# Patient Record
Sex: FEMALE | Race: WHITE | NOT HISPANIC OR LATINO | Employment: OTHER | URBAN - METROPOLITAN AREA
[De-identification: names, ages, dates, MRNs, and addresses within clinical notes are randomized per-mention and may not be internally consistent; named-entity substitution may affect disease eponyms.]

---

## 2017-01-10 RX ORDER — AMLODIPINE BESYLATE AND BENAZEPRIL HYDROCHLORIDE 5; 40 MG/1; MG/1
CAPSULE ORAL
Qty: 90 CAPSULE | Refills: 0 | OUTPATIENT
Start: 2017-01-10

## 2017-03-29 RX ORDER — HYDROCHLOROTHIAZIDE 25 MG/1
TABLET ORAL
Qty: 90 TABLET | Refills: 0 | Status: SHIPPED | OUTPATIENT
Start: 2017-03-29 | End: 2017-07-24 | Stop reason: SDUPTHER

## 2017-05-09 DIAGNOSIS — Z51.81 THERAPEUTIC DRUG MONITORING: ICD-10-CM

## 2017-05-09 DIAGNOSIS — I10 BENIGN ESSENTIAL HYPERTENSION: Primary | ICD-10-CM

## 2017-05-09 DIAGNOSIS — E78.5 HYPERLIPIDEMIA, UNSPECIFIED HYPERLIPIDEMIA TYPE: ICD-10-CM

## 2017-05-09 DIAGNOSIS — E55.9 VITAMIN D DEFICIENCY: ICD-10-CM

## 2017-05-10 ENCOUNTER — RESULTS ENCOUNTER (OUTPATIENT)
Dept: INTERNAL MEDICINE | Facility: CLINIC | Age: 82
End: 2017-05-10

## 2017-05-10 DIAGNOSIS — E78.5 HYPERLIPIDEMIA, UNSPECIFIED HYPERLIPIDEMIA TYPE: ICD-10-CM

## 2017-05-10 DIAGNOSIS — Z51.81 THERAPEUTIC DRUG MONITORING: ICD-10-CM

## 2017-05-10 DIAGNOSIS — E55.9 VITAMIN D DEFICIENCY: ICD-10-CM

## 2017-05-10 DIAGNOSIS — I10 BENIGN ESSENTIAL HYPERTENSION: ICD-10-CM

## 2017-05-11 LAB
25(OH)D3+25(OH)D2 SERPL-MCNC: 27 NG/ML (ref 30–100)
ALBUMIN SERPL-MCNC: 4.1 G/DL (ref 3.5–5.2)
ALBUMIN/GLOB SERPL: 1.4 G/DL
ALP SERPL-CCNC: 103 U/L (ref 39–117)
ALT SERPL-CCNC: 19 U/L (ref 1–33)
APPEARANCE UR: CLEAR
AST SERPL-CCNC: 23 U/L (ref 1–32)
BACTERIA #/AREA URNS HPF: ABNORMAL /HPF
BILIRUB SERPL-MCNC: 0.4 MG/DL (ref 0.1–1.2)
BILIRUB UR QL STRIP: NEGATIVE
BUN SERPL-MCNC: 17 MG/DL (ref 8–23)
BUN/CREAT SERPL: 19.3 (ref 7–25)
CALCIUM SERPL-MCNC: 9.6 MG/DL (ref 8.6–10.5)
CASTS URNS MICRO: ABNORMAL
CHLORIDE SERPL-SCNC: 96 MMOL/L (ref 98–107)
CHOLEST SERPL-MCNC: 155 MG/DL (ref 100–199)
CO2 SERPL-SCNC: 27.9 MMOL/L (ref 22–29)
COLOR UR: YELLOW
CREAT SERPL-MCNC: 0.88 MG/DL (ref 0.57–1)
EPI CELLS #/AREA URNS HPF: ABNORMAL /HPF
ERYTHROCYTE [DISTWIDTH] IN BLOOD BY AUTOMATED COUNT: 13.9 % (ref 11.7–13)
GLOBULIN SER CALC-MCNC: 2.9 GM/DL
GLUCOSE SERPL-MCNC: 105 MG/DL (ref 65–99)
GLUCOSE UR QL: NEGATIVE
HCT VFR BLD AUTO: 41.4 % (ref 35.6–45.5)
HDL SERPL-SCNC: 41.1 UMOL/L
HDLC SERPL-MCNC: 63 MG/DL
HGB BLD-MCNC: 13.2 G/DL (ref 11.9–15.5)
HGB UR QL STRIP: NEGATIVE
KETONES UR QL STRIP: NEGATIVE
LDL SERPL QN: 21.2 NM
LDL SERPL-SCNC: 973 NMOL/L
LDL SMALL SERPL-SCNC: 532 NMOL/L
LDLC SERPL CALC-MCNC: 73 MG/DL (ref 0–99)
LEUKOCYTE ESTERASE UR QL STRIP: ABNORMAL
MCH RBC QN AUTO: 31.4 PG (ref 26.9–32)
MCHC RBC AUTO-ENTMCNC: 31.9 G/DL (ref 32.4–36.3)
MCV RBC AUTO: 98.6 FL (ref 80.5–98.2)
NITRITE UR QL STRIP: NEGATIVE
PH UR STRIP: 7 [PH] (ref 5–8)
PLATELET # BLD AUTO: 373 10*3/MM3 (ref 140–500)
POTASSIUM SERPL-SCNC: 4.4 MMOL/L (ref 3.5–5.2)
PROT SERPL-MCNC: 7 G/DL (ref 6–8.5)
PROT UR QL STRIP: NEGATIVE
RBC # BLD AUTO: 4.2 10*6/MM3 (ref 3.9–5.2)
RBC #/AREA URNS HPF: ABNORMAL /HPF
SODIUM SERPL-SCNC: 138 MMOL/L (ref 136–145)
SP GR UR: 1.02 (ref 1–1.03)
TRIGL SERPL-MCNC: 94 MG/DL (ref 0–149)
TSH SERPL DL<=0.005 MIU/L-ACNC: 3.54 UIU/ML (ref 0.45–4.5)
UROBILINOGEN UR STRIP-MCNC: ABNORMAL MG/DL
WBC # BLD AUTO: 4.34 10*3/MM3 (ref 4.5–10.7)
WBC #/AREA URNS HPF: ABNORMAL /HPF

## 2017-05-17 ENCOUNTER — OFFICE VISIT (OUTPATIENT)
Dept: INTERNAL MEDICINE | Facility: CLINIC | Age: 82
End: 2017-05-17

## 2017-05-17 VITALS
SYSTOLIC BLOOD PRESSURE: 130 MMHG | HEIGHT: 62 IN | HEART RATE: 78 BPM | BODY MASS INDEX: 26.87 KG/M2 | OXYGEN SATURATION: 98 % | WEIGHT: 146 LBS | DIASTOLIC BLOOD PRESSURE: 58 MMHG

## 2017-05-17 DIAGNOSIS — N95.1 MENOPAUSAL STATE: ICD-10-CM

## 2017-05-17 DIAGNOSIS — E78.5 HYPERLIPIDEMIA, UNSPECIFIED HYPERLIPIDEMIA TYPE: Chronic | ICD-10-CM

## 2017-05-17 DIAGNOSIS — I10 BENIGN ESSENTIAL HYPERTENSION: Chronic | ICD-10-CM

## 2017-05-17 DIAGNOSIS — I65.23 ATHEROSCLEROSIS OF BOTH CAROTID ARTERIES: Chronic | ICD-10-CM

## 2017-05-17 DIAGNOSIS — K58.9 IRRITABLE BOWEL SYNDROME WITHOUT DIARRHEA: Chronic | ICD-10-CM

## 2017-05-17 DIAGNOSIS — R00.2 HEART PALPITATIONS: Chronic | ICD-10-CM

## 2017-05-17 DIAGNOSIS — Z51.81 THERAPEUTIC DRUG MONITORING: ICD-10-CM

## 2017-05-17 DIAGNOSIS — E55.9 VITAMIN D DEFICIENCY: Chronic | ICD-10-CM

## 2017-05-17 DIAGNOSIS — Z00.00 INITIAL MEDICARE ANNUAL WELLNESS VISIT: Primary | ICD-10-CM

## 2017-05-17 DIAGNOSIS — M85.80 OSTEOPENIA: Chronic | ICD-10-CM

## 2017-05-17 PROCEDURE — G0438 PPPS, INITIAL VISIT: HCPCS | Performed by: INTERNAL MEDICINE

## 2017-05-17 PROCEDURE — 99214 OFFICE O/P EST MOD 30 MIN: CPT | Performed by: INTERNAL MEDICINE

## 2017-05-23 ENCOUNTER — TELEPHONE (OUTPATIENT)
Dept: INTERNAL MEDICINE | Facility: CLINIC | Age: 82
End: 2017-05-23

## 2017-05-26 RX ORDER — ATORVASTATIN CALCIUM 80 MG/1
TABLET, FILM COATED ORAL
Qty: 30 TABLET | Refills: 3 | Status: SHIPPED | OUTPATIENT
Start: 2017-05-26 | End: 2017-12-05 | Stop reason: SDUPTHER

## 2017-05-30 ENCOUNTER — HOSPITAL ENCOUNTER (OUTPATIENT)
Dept: CARDIOLOGY | Facility: HOSPITAL | Age: 82
Discharge: HOME | End: 2017-05-30
Admitting: INTERNAL MEDICINE

## 2017-05-30 LAB
BH CV XLRA MEAS LEFT DIST CCA EDV: -19.9 CM/SEC
BH CV XLRA MEAS LEFT DIST CCA PSV: -83.8 CM/SEC
BH CV XLRA MEAS LEFT DIST ICA EDV: -25.2 CM/SEC
BH CV XLRA MEAS LEFT DIST ICA PSV: -93.8 CM/SEC
BH CV XLRA MEAS LEFT MID ICA EDV: -29.3 CM/SEC
BH CV XLRA MEAS LEFT MID ICA PSV: -103 CM/SEC
BH CV XLRA MEAS LEFT PROX CCA EDV: 13.5 CM/SEC
BH CV XLRA MEAS LEFT PROX CCA PSV: 76.2 CM/SEC
BH CV XLRA MEAS LEFT PROX ECA EDV: -8.2 CM/SEC
BH CV XLRA MEAS LEFT PROX ECA PSV: -85 CM/SEC
BH CV XLRA MEAS LEFT PROX ICA EDV: -21.1 CM/SEC
BH CV XLRA MEAS LEFT PROX ICA PSV: -79.7 CM/SEC
BH CV XLRA MEAS LEFT PROX SCLA PSV: 106 CM/SEC
BH CV XLRA MEAS LEFT VERTEBRAL A EDV: -14.7 CM/SEC
BH CV XLRA MEAS LEFT VERTEBRAL A PSV: -57.5 CM/SEC
BH CV XLRA MEAS RIGHT DIST CCA EDV: 16.4 CM/SEC
BH CV XLRA MEAS RIGHT DIST CCA PSV: 78 CM/SEC
BH CV XLRA MEAS RIGHT DIST ICA EDV: -19.9 CM/SEC
BH CV XLRA MEAS RIGHT DIST ICA PSV: -87.4 CM/SEC
BH CV XLRA MEAS RIGHT MID ICA EDV: -25.2 CM/SEC
BH CV XLRA MEAS RIGHT MID ICA PSV: -97.3 CM/SEC
BH CV XLRA MEAS RIGHT PROX CCA EDV: 15.8 CM/SEC
BH CV XLRA MEAS RIGHT PROX CCA PSV: 76.8 CM/SEC
BH CV XLRA MEAS RIGHT PROX ECA EDV: -8.2 CM/SEC
BH CV XLRA MEAS RIGHT PROX ECA PSV: -85 CM/SEC
BH CV XLRA MEAS RIGHT PROX ICA EDV: -15.8 CM/SEC
BH CV XLRA MEAS RIGHT PROX ICA PSV: -82.7 CM/SEC
BH CV XLRA MEAS RIGHT PROX SCLA PSV: 106 CM/SEC
BH CV XLRA MEAS RIGHT VERTEBRAL A EDV: -5.2 CM/SEC
BH CV XLRA MEAS RIGHT VERTEBRAL A PSV: -30.5 CM/SEC
LEFT ARM BP: NORMAL MMHG
RIGHT ARM BP: NORMAL MMHG

## 2017-05-30 PROCEDURE — 93880 EXTRACRANIAL BILAT STUDY: CPT

## 2017-07-06 RX ORDER — AMLODIPINE BESYLATE AND BENAZEPRIL HYDROCHLORIDE 5; 40 MG/1; MG/1
1 CAPSULE ORAL DAILY
Qty: 90 CAPSULE | Refills: 1 | Status: SHIPPED | OUTPATIENT
Start: 2017-07-06 | End: 2017-07-13 | Stop reason: SDUPTHER

## 2017-07-13 RX ORDER — AMLODIPINE BESYLATE AND BENAZEPRIL HYDROCHLORIDE 5; 40 MG/1; MG/1
1 CAPSULE ORAL DAILY
Qty: 7 CAPSULE | Refills: 0 | Status: SHIPPED | OUTPATIENT
Start: 2017-07-13 | End: 2018-01-09 | Stop reason: SDUPTHER

## 2017-07-24 RX ORDER — HYDROCHLOROTHIAZIDE 25 MG/1
TABLET ORAL
Qty: 90 TABLET | Refills: 0 | Status: SHIPPED | OUTPATIENT
Start: 2017-07-24 | End: 2017-08-29 | Stop reason: SDUPTHER

## 2017-08-29 ENCOUNTER — OFFICE VISIT (OUTPATIENT)
Dept: INTERNAL MEDICINE | Facility: CLINIC | Age: 82
End: 2017-08-29

## 2017-08-29 VITALS
HEIGHT: 62 IN | SYSTOLIC BLOOD PRESSURE: 120 MMHG | HEART RATE: 75 BPM | OXYGEN SATURATION: 94 % | WEIGHT: 147 LBS | DIASTOLIC BLOOD PRESSURE: 64 MMHG | BODY MASS INDEX: 27.05 KG/M2

## 2017-08-29 DIAGNOSIS — N30.00 ACUTE CYSTITIS WITHOUT HEMATURIA: Primary | ICD-10-CM

## 2017-08-29 DIAGNOSIS — I10 BENIGN ESSENTIAL HYPERTENSION: Chronic | ICD-10-CM

## 2017-08-29 DIAGNOSIS — N32.89 IRRITABLE BLADDER: ICD-10-CM

## 2017-08-29 LAB
BILIRUB BLD-MCNC: NEGATIVE MG/DL
GLUCOSE UR STRIP-MCNC: NEGATIVE MG/DL
KETONES UR QL: NEGATIVE
LEUKOCYTE EST, POC: NEGATIVE
NITRITE UR-MCNC: NEGATIVE MG/ML
PH UR: 7 [PH] (ref 5–8)
PROT UR STRIP-MCNC: NEGATIVE MG/DL
RBC # UR STRIP: NEGATIVE /UL
SP GR UR: 1.01 (ref 1–1.03)
UROBILINOGEN UR QL: NORMAL

## 2017-08-29 PROCEDURE — 99213 OFFICE O/P EST LOW 20 MIN: CPT | Performed by: INTERNAL MEDICINE

## 2017-08-29 PROCEDURE — 81003 URINALYSIS AUTO W/O SCOPE: CPT | Performed by: INTERNAL MEDICINE

## 2017-08-29 RX ORDER — HYDROCHLOROTHIAZIDE 25 MG/1
TABLET ORAL
Qty: 90 TABLET | Refills: 3
Start: 2017-08-29 | End: 2017-10-22 | Stop reason: SDUPTHER

## 2017-08-29 NOTE — PROGRESS NOTES
08/29/2017    Patient Information  Stephanie Hawkins                                                                                          [address] [facility] [address]      12/16/1932  [phone]      Chief Complaint:     Follow-up urinary tract infection.    History of Present Illness:    Patient with a history of hypertension, carotid artery plaque, hyperlipidemia, osteopenia.  She presents today for follow-up of recent urinary tract infection that was treated at the urgent care.  This will be described in detail below.  Past medical history reviewed and updated where necessary including health maintenance parameters.  This reveals she is up-to-date.    Review of Systems   Constitution: Negative.   HENT: Negative.    Eyes: Negative.    Cardiovascular: Negative.    Respiratory: Negative.    Endocrine: Negative.    Hematologic/Lymphatic: Negative.    Skin: Negative.    Musculoskeletal: Negative.    Gastrointestinal: Negative.    Genitourinary: Positive for frequency and nocturia.   Neurological: Negative.    Psychiatric/Behavioral: Negative.    Allergic/Immunologic: Negative.        Active Problems:    Patient Active Problem List   Diagnosis   • Benign essential hypertension   • Carotid artery plaque, 05/30/2014--mild bilateral carotid plaque.   • Diverticulosis of colon   • Heart palpitations   • Hyperlipidemia   • Irritable bowel syndrome   • Osteoarthritis of left knee   • Osteopenia, 05/30/2014--lumbar spine 0.6.  Right femoral neck -1.2.  Left femoral neck -1.7.   • Vitamin D deficiency   • Therapeutic drug monitoring   • Glaucoma, bilateral   • Menopausal state   • Urinary tract infection         Past Medical History:   Diagnosis Date   • History of bone density study 05/30/2014 05/30/2014--DEXA scan reveals average lumbar spine T score of 0.6. Right femoral neck T score -1.2. Left femoral neck T score -1.7. Osteopenia. Compared to the previous examination of 11/09/2010, there is a 1%  decrease in the left hip bone mineral density, 2.1% decrease in the right hip bone density, and a 1.6% improvement in the lumbar spine bone density.   11/22/2010--treatment for osteopenia be   • History of carotid Doppler/vascular screen 05/30/2014 05/30/2014--vascular screen reveals mild bilateral carotid plaque, negative for AAA, negative for PAD.   06/19/2012--vascular screen revealed bilateral, less than 50% internal carotid artery stenosis, negative for AAA, negative for PAD.   05/10/2010--carotid Doppler revealed 16-49% stenosis in the right and left internal carotid arteries.   01/08/2002--vascular screen revealed mild bilateral carot   • History of chest x-ray 03/04/2009 03/04/2009--chest x-ray reveals small calcified granuloma bilaterally. No active disease.   • History of depression 05/18/2009 05/18/2009--treatment for depression initiated with Lexapro. This was related to patient's son developing cancer. She subsequently discontinued the medication and has had no further problems.   • History of dislocation of shoulder 01/16/2006 01/16/2006--patient fell and dislocated her shoulder and subsequently underwent reduction.   • History of herpes zoster virus vaccination 01/11/2016 1/11/2016--zoster vaccination given.   • History of Holter monitoring 10/13/2008    10/13/2008--24-hour Holter monitor reveals an average heart rate of 69, a minimum heart rate was 55. Maximum heart rate was 98. There were no pauses. Ventricular ectopy was zero. Supraventricular ectopy was 5585, with 15 supraventricular runs. Supraventricular bigeminy events were 6 and supraventricular trigeminy events were one. There was no atrial fibrillation/flutter. No ST segment changes. Pat   • History of mammogram 10/07/2015    10/07/2015--negative mammogram.   09/26/2014--normal mammogram.   9/10/2013--negative mammogram.   09/07/2012--negative mammogram.   • History of pneumococcal vaccination 05/01/2015 05/01/2015--Prevnar  13 given. Patient received her initial pneumococcal vaccination after the age of 65 and therefore no further pneumococcal vaccinations required after today's Prevnar.   [facility]   • History of Right rotator cuff tendinitis 02/07/20112    02/07/2012--patient evaluated by the orthopedist and given a Depo-Medrol injection in the right shoulder for rotator cuff tendinitis.   • History of Temporomandibular joint disease 11/30/2010 11/30/2010--patient was evaluated by ENT with complaints consistent with TMJ. Patient reports that her symptoms have resolved.         Past Surgical History:   Procedure Laterality Date   • CATARACT EXTRACTION Right 01/2014 January 2014--cataract extirpation and intraocular lens implantation right eye.    • CATARACT EXTRACTION Left 09/2010 September 2010--cataract extirpation and intraocular lens implantation in the left eye.   • COLONOSCOPY  11/03/2006 11/03/2006--the entire examined colon was normal except for scattered diverticuli.    • COLONOSCOPY  10/16/2001    10/16/2001--colonoscopy revealed a few left-sided scattered diverticuli.         Allergies   Allergen Reactions   • Neomycin-Bacitracin Zn-Polymyx    • Sulfa Antibiotics            Current Outpatient Prescriptions:   •  amLODIPine-benazepril (LOTREL) 5-40 MG per capsule, Take 1 capsule by mouth Daily., Disp: 7 capsule, Rfl: 0  •  aspirin 81 MG tablet, Take  by mouth., Disp: , Rfl:   •  atorvastatin (LIPITOR) 80 MG tablet, TAKE 1 TABLET BY MOUTH AT BEDTIME FOR CHOLESTEROL, Disp: 30 tablet, Rfl: 3  •  Cholecalciferol (VITAMIN D3) 5000 UNITS capsule capsule, 1 by mouth daily as directed, Disp: 30 capsule, Rfl:   •  Garlic 1000 MG capsule, Take  by mouth., Disp: , Rfl:   •  hydrochlorothiazide (HYDRODIURIL) 25 MG tablet, TAKE 1 TABLET DAILY (NEED LABS AND FOLLOW UP AS SOON AS POSSIBLE), Disp: 90 tablet, Rfl: 0  •  metoprolol succinate XL (TOPROL-XL) 25 MG  "24 hr tablet, Take  by mouth., Disp: , Rfl:   •  Multiple Vitamin (MULTI VITAMIN DAILY PO), Take  by mouth daily., Disp: , Rfl:   •  TRAVATAN Z 0.004 % solution ophthalmic solution, , Disp: , Rfl:       Family History   Problem Relation Age of Onset   • Cirrhosis Mother      Of Liver. Mother  at age 62 from alcoholic cirrhosis.   • Heart attack Father      Acute Myocardial Infarction.  Father  of a myocardial infarction at age 68.         Social History     Social History   • Marital status:      Spouse name: N/A   • Number of children: N/A   • Years of education: N/A     Occupational History   • Retired () [employer]      Social History Main Topics   • Smoking status: Never Smoker   • Smokeless tobacco: Never Used   • Alcohol use No   • Drug use: No   • Sexual activity: Not Currently     Partners: Male     Other Topics Concern   • Not on file     Social History Narrative         Vitals:    17 1620   BP: 120/64   Pulse: 75   SpO2: 94%   Weight: 147 lb (66.7 kg)   Height: 62\" (157.5 cm)          Physical Exam:    General: Alert and oriented x 3.  No acute distress.  Normal affect.  HEENT: Pupils equal, round, reactive to light; extraocular movements intact; sclerae nonicteric; pharynx, ear canals and TMs normal.  Neck: Without JVD, thyromegaly, bruit, or adenopathy.  Lungs: Clear to auscultation in all fields.  Heart: Regular rate and rhythm without murmur, rub, gallop, or click.  Abdomen: Soft, nontender, without hepatosplenomegaly or hernia.  Bowel sounds normal.  : Deferred.  Rectal: Deferred.  Extremities: Without clubbing, cyanosis, edema, or pulse deficit.  Neurologic: Intact without focal deficit.  Normal station and gait observed during ingress and egress from the examination room.  Skin: Without significant lesion.  Musculoskeletal: Unremarkable.      Lab/other results:    Automated urinalysis is negative for blood, leukocytes, " nitrites.    Assessment/Plan:     Diagnosis Plan   1. Urinary tract infection       Patient with a documented recent urinary tract infection presents with continued urinary frequency but has no dysuria.  Automated urinalysis appears negative but I think we should be assured that the urinary tract infection has been cured with a culture.  It may be that patient is experiencing irritable bladder symptoms.  If the symptoms persist then she may want to see me and consider medication for irritable bladder.    Plan is as follows: Check urine culture.  Patient will follow-up on the phone for the results.  She should follow-up with me if she has any progression of her symptoms.    Addendum: Upon further consideration, particularly after patient reports nocturia 4-5 times per night as well as the associated frequency and urgency, we will give an empiric trial of Myrbetriq 25 mg by mouth daily.  Patient may increase to 50 mg per day if necessary.        Procedures

## 2017-09-01 LAB
BACTERIA UR CULT: NORMAL
BACTERIA UR CULT: NORMAL

## 2017-09-26 ENCOUNTER — OFFICE VISIT (OUTPATIENT)
Dept: INTERNAL MEDICINE | Facility: CLINIC | Age: 82
End: 2017-09-26

## 2017-09-26 VITALS
WEIGHT: 148 LBS | BODY MASS INDEX: 27.23 KG/M2 | SYSTOLIC BLOOD PRESSURE: 112 MMHG | DIASTOLIC BLOOD PRESSURE: 62 MMHG | OXYGEN SATURATION: 99 % | HEART RATE: 66 BPM | HEIGHT: 62 IN

## 2017-09-26 DIAGNOSIS — E78.5 HYPERLIPIDEMIA, UNSPECIFIED HYPERLIPIDEMIA TYPE: Chronic | ICD-10-CM

## 2017-09-26 DIAGNOSIS — Z51.81 THERAPEUTIC DRUG MONITORING: ICD-10-CM

## 2017-09-26 DIAGNOSIS — E55.9 VITAMIN D DEFICIENCY: Chronic | ICD-10-CM

## 2017-09-26 DIAGNOSIS — Z23 NEED FOR INFLUENZA VACCINATION: ICD-10-CM

## 2017-09-26 DIAGNOSIS — I10 BENIGN ESSENTIAL HYPERTENSION: Chronic | ICD-10-CM

## 2017-09-26 DIAGNOSIS — N30.00 ACUTE CYSTITIS WITHOUT HEMATURIA: Primary | ICD-10-CM

## 2017-09-26 DIAGNOSIS — I65.23 ATHEROSCLEROSIS OF BOTH CAROTID ARTERIES: Chronic | ICD-10-CM

## 2017-09-26 PROCEDURE — G0008 ADMIN INFLUENZA VIRUS VAC: HCPCS | Performed by: INTERNAL MEDICINE

## 2017-09-26 PROCEDURE — 99213 OFFICE O/P EST LOW 20 MIN: CPT | Performed by: INTERNAL MEDICINE

## 2017-09-26 NOTE — PROGRESS NOTES
09/26/2017    Patient Information  Stephanie Hawkins                                                                                          [address] [id number]      12/16/1932  [phone]      Chief Complaint:     Follow-up recent urinary tract infection.  Requests flu shot.  Follow-up carotid artery stenosis and recent carotid Doppler study.  No new acute complaints.    History of Present Illness:    Patient with a history of hypertension, carotid artery plaque, heart palpitations, hyperlipidemia, IBS, primary osteoarthritis of left knee, osteopenia, vitamin D deficiency, glaucoma, irritable bladder, recent urinary tract infection.  She presents today primarily to follow-up on a urinary tract infection that was somewhat slow to resolve.  She currently feels well and has no new acute complaints.  Patient has carotid artery plaque and had a carotid Doppler study in May of this year that we need to review.  Past medical history reviewed and updated where necessary including health maintenance parameters.  This reveals she needs an influenza vaccination which we will give today.     The history regarding recent urinary tract infection:    09/26/2017--patient seen in follow-up and reports she feels better and she is back to her old self.  Results of the urine culture discussed.  It appears her urinary tract infection is cured.    09/01/2017--urine culture reveals less than 10,000 colony-forming units that is considered insignificant.    08/29/2017--patient seen in follow-up by Dr. Dietrich and she reports that she really does not feel well.  She has no dysuria but has urinary frequency.  Automated urinalysis is negative for leukocytes and also negative for blood and nitrites.    08/07/2017--patient presented to the urgent care with complaints of dysuria described as burning as well as urinary frequency and urgency.  Urinalysis revealed 3+ leukocytes and trace blood.  Nitrite negative.  She was  discharged home on nitrofurantoin.  However, urine cultures returned greater than 100,000 colony-forming units of Proteus which was resistant to nitrofurantoin.  She was subsequently switched to Omnicef.    Review of Systems   Constitution: Negative.   HENT: Negative.    Eyes: Negative.    Cardiovascular: Negative.    Respiratory: Negative.    Endocrine: Negative.    Hematologic/Lymphatic: Negative.    Skin: Negative.    Musculoskeletal: Negative.    Gastrointestinal: Negative.    Genitourinary: Negative.    Neurological: Negative.    Psychiatric/Behavioral: Negative.    Allergic/Immunologic: Negative.        Active Problems:    Patient Active Problem List   Diagnosis   • Benign essential hypertension   • Carotid artery plaque, 05/30/2017--16-49% left.  Mild plaque right.  05/30/2014--mild bilateral carotid plaque.   • Diverticulosis of colon   • Heart palpitations   • Hyperlipidemia   • Irritable bowel syndrome   • Primary osteoarthritis of left knee   • Osteopenia, 05/30/2014--lumbar spine 0.6.  Right femoral neck -1.2.  Left femoral neck -1.7.   • Vitamin D deficiency   • Therapeutic drug monitoring   • Glaucoma, bilateral   • Menopausal state   • History of Urinary tract infection   • Irritable bladder         Past Medical History:   Diagnosis Date   • Benign essential hypertension 2/26/2001 02/26/2001--patient was seen as a new patient. She was ordered being treated for hypertension at that time.   • Carotid artery plaque, 05/30/2014--mild bilateral carotid plaque. 1/8/2002 05/30/2014--vascular screen reveals mild bilateral carotid plaque, negative for AAA, negative for PAD.   06/19/2012--vascular screen revealed bilateral, less than 50% internal carotid artery stenosis, negative for AAA, negative for PAD.   05/10/2010--carotid Doppler revealed 16-49% stenosis in the right and left internal carotid arteries.   01/08/2002--vascular screen revealed mild bilateral carotid artery plaque, negative for AAA,  negative for PAD.   • Diverticulosis of colon 10/16/2001    11/03/2006--the entire examined colon was normal except for scattered diverticuli.   10/16/2001--colonoscopy revealed a few left-sided scattered diverticuli.   • Glaucoma, bilateral 6/1/2016 06/01/2016--routine follow-up.  She reports she was diagnosed with glaucoma about a month or so ago treated with Travatan eyedrops.   • Heart palpitations 10/13/2008    Patient has at a long history of heart palpitations.   10/13/2008--24-hour Holter monitor reveals an average heart rate of 69, a minimum heart rate was 55. Maximum heart rate was 98. There were no pauses. Ventricular ectopy was zero. Supraventricular ectopy was 5585, with 15 supraventricular runs. Supraventricular bigeminy events were 6 and supraventricular trigeminy events were one. There was no atrial fibrillation/flutter. No ST segment changes. Patient's heart palpitations related to frequent PACs which have been controlled with atenolol.   • History of bone density study 05/30/2014 05/30/2014--DEXA scan reveals average lumbar spine T score of 0.6. Right femoral neck T score -1.2. Left femoral neck T score -1.7. Osteopenia. Compared to the previous examination of 11/09/2010, there is a 1% decrease in the left hip bone mineral density, 2.1% decrease in the right hip bone density, and a 1.6% improvement in the lumbar spine bone density.   11/22/2010--treatment for osteopenia be   • History of carotid Doppler/vascular screen 05/30/2014 05/30/2014--vascular screen reveals mild bilateral carotid plaque, negative for AAA, negative for PAD.   06/19/2012--vascular screen revealed bilateral, less than 50% internal carotid artery stenosis, negative for AAA, negative for PAD.   05/10/2010--carotid Doppler revealed 16-49% stenosis in the right and left internal carotid arteries.   01/08/2002--vascular screen revealed mild bilateral carot   • History of chest x-ray 03/04/2009 03/04/2009--chest x-ray  reveals small calcified granuloma bilaterally. No active disease.   • History of depression 05/18/2009 05/18/2009--treatment for depression initiated with Lexapro. This was related to patient's son developing cancer. She subsequently discontinued the medication and has had no further problems.   • History of dislocation of shoulder 01/16/2006 01/16/2006--patient fell and dislocated her shoulder and subsequently underwent reduction.   • History of herpes zoster virus vaccination 01/11/2016 1/11/2016--zoster vaccination given.   • History of Holter monitoring 10/13/2008    10/13/2008--24-hour Holter monitor reveals an average heart rate of 69, a minimum heart rate was 55. Maximum heart rate was 98. There were no pauses. Ventricular ectopy was zero. Supraventricular ectopy was 5585, with 15 supraventricular runs. Supraventricular bigeminy events were 6 and supraventricular trigeminy events were one. There was no atrial fibrillation/flutter. No ST segment changes. Pat   • History of mammogram 10/07/2015    10/07/2015--negative mammogram.   09/26/2014--normal mammogram.   9/10/2013--negative mammogram.   09/07/2012--negative mammogram.   • History of pneumococcal vaccination 05/01/2015 05/01/2015--Prevnar 13 given. Patient received her initial pneumococcal vaccination after the age of 65 and therefore no further pneumococcal vaccinations required after today's Prevnar.   2010--patient reports she received a pneumococcal vaccination at the [facility].   • History of Right rotator cuff tendinitis 02/07/20112 02/07/2012--patient evaluated by the orthopedist and given a Depo-Medrol injection in the right shoulder for rotator cuff tendinitis.   • History of Temporomandibular joint disease 11/30/2010 11/30/2010--patient was evaluated by ENT with complaints consistent with TMJ. Patient reports that her symptoms have resolved.   • Hyperlipidemia 2/26/2001 02/26/2001--patient was seen as a new patient. She was  already on cholesterol medication at that time.   • Irritable bladder 8/29/2017 08/29/2017--patient seen in follow-up after recent urinary tract infection with complaints of nocturia ×4-5 per night as well as frequency and urgency.  Repeat urinalysis is negative.  Urine culture sent.  Given the fact the patient is had the urinary symptoms prior to the onset of the recent UTI, I will treat her empirically for irritable bladder.  Myrbetriq 25 mg per day.  May increase to 50 mg per day if necessary.  I will have her follow-up in about 3 weeks to reassess his situation.   • Irritable bowel syndrome 4/27/2016   • Menopausal state 5/17/2017   • Osteopenia, 05/30/2014--lumbar spine 0.6.  Right femoral neck -1.2.  Left femoral neck -1.7. 6/9/2006 05/30/2014--DEXA scan reveals average lumbar spine T score of 0.6. Right femoral neck T score -1.2. Left femoral neck T score -1.7. Osteopenia. Compared to the previous examination of 11/09/2010, there is a 1% decrease in the left hip bone mineral density, 2.1% decrease in the right hip bone density, and a 1.6% improvement in the lumbar spine bone density.   11/22/2010--treatment for osteopenia begun with Fosamax but patient discontinued it on her own.   11/09/2010--DEXA scan revealed average lumbar spine T score 0.5. Left femoral neck T score -1.4. Right femoral neck T score -1.1. Osteopenia.   06/09/2006--DEXA scan reveals lumbar spine T score 0.6. Left hip T score -0.8, left femoral neck T score -1.3.   • Primary osteoarthritis of left knee 3/23/2004    03/23/2004--patient was evaluated by the orthopedist for left knee pain. X-rays revealed osteoarthritis with some medial joint space narrowing, subchondral sclerosis, and patellofemoral spurs. Cortisone injections were given.   • Vitamin D deficiency 4/27/2016         Past Surgical History:   Procedure Laterality Date   • CATARACT EXTRACTION Right 01/2014 January 2014--cataract extirpation and intraocular lens  implantation right eye.    • CATARACT EXTRACTION Left 2010--cataract extirpation and intraocular lens implantation in the left eye.   • COLONOSCOPY  2006--the entire examined colon was normal except for scattered diverticuli.    • COLONOSCOPY  10/16/2001    10/16/2001--colonoscopy revealed a few left-sided scattered diverticuli.         Allergies   Allergen Reactions   • Neomycin-Bacitracin Zn-Polymyx    • Sulfa Antibiotics            Current Outpatient Prescriptions:   •  amLODIPine-benazepril (LOTREL) 5-40 MG per capsule, Take 1 capsule by mouth Daily., Disp: 7 capsule, Rfl: 0  •  aspirin 81 MG tablet, Take  by mouth., Disp: , Rfl:   •  atorvastatin (LIPITOR) 80 MG tablet, TAKE 1 TABLET BY MOUTH AT BEDTIME FOR CHOLESTEROL, Disp: 30 tablet, Rfl: 3  •  Cholecalciferol (VITAMIN D3) 5000 UNITS capsule capsule, 1 by mouth daily as directed, Disp: 30 capsule, Rfl:   •  Garlic 1000 MG capsule, Take  by mouth., Disp: , Rfl:   •  hydrochlorothiazide (HYDRODIURIL) 25 MG tablet, 1 by mouth daily for blood pressure, Disp: 90 tablet, Rfl: 3  •  metoprolol succinate XL (TOPROL-XL) 25 MG 24 hr tablet, Take  by mouth., Disp: , Rfl:   •  Mirabegron ER (MYRBETRIQ) 25 MG tablet sustained-release 24 hour 24 hr tablet, 1 by mouth daily for irritable bladder, Disp: 30 tablet, Rfl: 6  •  Multiple Vitamin (MULTI VITAMIN DAILY PO), Take  by mouth daily., Disp: , Rfl:   •  TRAVATAN Z 0.004 % solution ophthalmic solution, , Disp: , Rfl:       Family History   Problem Relation Age of Onset   • Cirrhosis Mother      Of Liver. Mother  at age 62 from alcoholic cirrhosis.   • Heart attack Father      Acute Myocardial Infarction.  Father  of a myocardial infarction at age 68.         Social History     Social History   • Marital status:      Spouse name: N/A   • Number of children: N/A   • Years of education: N/A     Occupational History   • Retired () [employer]   "    Social History Main Topics   • Smoking status: Never Smoker   • Smokeless tobacco: Never Used   • Alcohol use No   • Drug use: No   • Sexual activity: Not Currently     Partners: Male     Other Topics Concern   • Not on file     Social History Narrative         Vitals:    09/26/17 1336   BP: 112/62   Pulse: 66   SpO2: 99%   Weight: 148 lb (67.1 kg)   Height: 62\" (157.5 cm)          Physical Exam:    General: Alert and oriented x 3.  No acute distress.  Normal affect.  HEENT: Pupils equal, round, reactive to light; extraocular movements intact; sclerae nonicteric; pharynx, ear canals and TMs normal.  Neck: Without JVD, thyromegaly, bruit, or adenopathy.  Lungs: Clear to auscultation in all fields.  Heart: Regular rate and rhythm without murmur, rub, gallop, or click.  Abdomen: Soft, nontender, without hepatosplenomegaly or hernia.  Bowel sounds normal.  : Deferred.  Rectal: Deferred.  Extremities: Without clubbing, cyanosis, edema, or pulse deficit.  Neurologic: Intact without focal deficit.  Normal station and gait observed during ingress and egress from the examination room.  Skin: Without significant lesion.  Musculoskeletal: Unremarkable.      Lab/other results:    Urine culture reviewed and was essentially negative.    I reviewed the results of the carotid Doppler study with the patient.    Assessment/Plan:     Diagnosis Plan   1. History of Urinary tract infection     2. Carotid artery plaque, 05/30/2014--mild bilateral carotid plaque.     3. Hyperlipidemia, unspecified hyperlipidemia type     4. Benign essential hypertension     5. Need for influenza vaccination         Patient with recent urinary tract infection who was somewhat slow to respond.  At the time of the last visit it appears her urinary tract infection was resolved but she still did not feel well.  However, she now feels back to her old self although she does have some underlying fatigue.  It appears that the carotid artery plaque may have " progressed somewhat on the right.  We will begin to monitor this every 2 years.  She has hyperlipidemia and this will need to be reassessed in the near future.  Her blood pressure assess today and appears to be controlled.    Plan is as follows: High-dose influenza vaccination given.  We will schedule lab in follow-up for some time in November.      Procedures

## 2017-10-22 DIAGNOSIS — I10 BENIGN ESSENTIAL HYPERTENSION: Chronic | ICD-10-CM

## 2017-10-23 RX ORDER — HYDROCHLOROTHIAZIDE 25 MG/1
TABLET ORAL
Qty: 90 TABLET | Refills: 0 | Status: SHIPPED | OUTPATIENT
Start: 2017-10-23 | End: 2017-12-05 | Stop reason: SDUPTHER

## 2017-11-01 ENCOUNTER — TRANSCRIBE ORDERS (OUTPATIENT)
Dept: ADMINISTRATIVE | Facility: HOSPITAL | Age: 82
End: 2017-11-01

## 2017-11-01 DIAGNOSIS — Z12.31 ENCOUNTER FOR MAMMOGRAM TO ESTABLISH BASELINE MAMMOGRAM: Primary | ICD-10-CM

## 2017-11-27 DIAGNOSIS — Z51.81 THERAPEUTIC DRUG MONITORING: ICD-10-CM

## 2017-11-27 DIAGNOSIS — E55.9 VITAMIN D DEFICIENCY: Chronic | ICD-10-CM

## 2017-11-27 DIAGNOSIS — E78.5 HYPERLIPIDEMIA, UNSPECIFIED HYPERLIPIDEMIA TYPE: Chronic | ICD-10-CM

## 2017-11-30 LAB
25(OH)D3+25(OH)D2 SERPL-MCNC: 34.7 NG/ML (ref 30–100)
ALBUMIN SERPL-MCNC: 4.2 G/DL (ref 3.5–5.2)
ALBUMIN/GLOB SERPL: 1.6 G/DL
ALP SERPL-CCNC: 103 U/L (ref 39–117)
ALT SERPL-CCNC: 22 U/L (ref 1–33)
AST SERPL-CCNC: 21 U/L (ref 1–32)
BILIRUB SERPL-MCNC: 0.5 MG/DL (ref 0.1–1.2)
BUN SERPL-MCNC: 20 MG/DL (ref 8–23)
BUN/CREAT SERPL: 22.7 (ref 7–25)
CALCIUM SERPL-MCNC: 9.7 MG/DL (ref 8.6–10.5)
CHLORIDE SERPL-SCNC: 98 MMOL/L (ref 98–107)
CHOLEST SERPL-MCNC: 159 MG/DL (ref 100–199)
CK SERPL-CCNC: 64 U/L (ref 20–180)
CO2 SERPL-SCNC: 30.1 MMOL/L (ref 22–29)
CREAT SERPL-MCNC: 0.88 MG/DL (ref 0.57–1)
ERYTHROCYTE [DISTWIDTH] IN BLOOD BY AUTOMATED COUNT: 13 % (ref 11.7–13)
GFR SERPLBLD CREATININE-BSD FMLA CKD-EPI: 61 ML/MIN/1.73
GFR SERPLBLD CREATININE-BSD FMLA CKD-EPI: 74 ML/MIN/1.73
GLOBULIN SER CALC-MCNC: 2.6 GM/DL
GLUCOSE SERPL-MCNC: 103 MG/DL (ref 65–99)
HCT VFR BLD AUTO: 40 % (ref 35.6–45.5)
HDL SERPL-SCNC: 40.2 UMOL/L
HDLC SERPL-MCNC: 60 MG/DL
HGB BLD-MCNC: 12.8 G/DL (ref 11.9–15.5)
LDL SERPL QN: 21.2 NM
LDL SERPL-SCNC: 763 NMOL/L
LDL SMALL SERPL-SCNC: 301 NMOL/L
LDLC SERPL CALC-MCNC: 74 MG/DL (ref 0–99)
MCH RBC QN AUTO: 31.8 PG (ref 26.9–32)
MCHC RBC AUTO-ENTMCNC: 32 G/DL (ref 32.4–36.3)
MCV RBC AUTO: 99.5 FL (ref 80.5–98.2)
PLATELET # BLD AUTO: 349 10*3/MM3 (ref 140–500)
POTASSIUM SERPL-SCNC: 4.4 MMOL/L (ref 3.5–5.2)
PROT SERPL-MCNC: 6.8 G/DL (ref 6–8.5)
RBC # BLD AUTO: 4.02 10*6/MM3 (ref 3.9–5.2)
SODIUM SERPL-SCNC: 139 MMOL/L (ref 136–145)
T3FREE SERPL-MCNC: 3.3 PG/ML (ref 2–4.4)
T4 FREE SERPL-MCNC: 1.05 NG/DL (ref 0.93–1.7)
TRIGL SERPL-MCNC: 123 MG/DL (ref 0–149)
TSH SERPL DL<=0.005 MIU/L-ACNC: 5.19 MIU/ML (ref 0.27–4.2)
WBC # BLD AUTO: 6.53 10*3/MM3 (ref 4.5–10.7)

## 2017-12-01 ENCOUNTER — HOSPITAL ENCOUNTER (OUTPATIENT)
Dept: MAMMOGRAPHY | Facility: HOSPITAL | Age: 82
Discharge: HOME | End: 2017-12-01
Admitting: INTERNAL MEDICINE

## 2017-12-01 DIAGNOSIS — Z12.31 ENCOUNTER FOR MAMMOGRAM TO ESTABLISH BASELINE MAMMOGRAM: ICD-10-CM

## 2017-12-01 PROCEDURE — 77063 BREAST TOMOSYNTHESIS BI: CPT

## 2017-12-01 PROCEDURE — G0202 SCR MAMMO BI INCL CAD: HCPCS

## 2017-12-05 ENCOUNTER — OFFICE VISIT (OUTPATIENT)
Dept: INTERNAL MEDICINE | Facility: CLINIC | Age: 82
End: 2017-12-05

## 2017-12-05 VITALS
DIASTOLIC BLOOD PRESSURE: 62 MMHG | SYSTOLIC BLOOD PRESSURE: 140 MMHG | HEART RATE: 73 BPM | HEIGHT: 62 IN | OXYGEN SATURATION: 98 % | WEIGHT: 151 LBS | BODY MASS INDEX: 27.79 KG/M2

## 2017-12-05 DIAGNOSIS — E78.5 HYPERLIPIDEMIA, UNSPECIFIED HYPERLIPIDEMIA TYPE: Primary | Chronic | ICD-10-CM

## 2017-12-05 DIAGNOSIS — I10 BENIGN ESSENTIAL HYPERTENSION: Chronic | ICD-10-CM

## 2017-12-05 DIAGNOSIS — M85.80 OSTEOPENIA: Chronic | ICD-10-CM

## 2017-12-05 DIAGNOSIS — E03.9 HYPOTHYROIDISM, UNSPECIFIED TYPE: ICD-10-CM

## 2017-12-05 DIAGNOSIS — N32.89 IRRITABLE BLADDER: Chronic | ICD-10-CM

## 2017-12-05 DIAGNOSIS — M85.89 OSTEOPENIA OF MULTIPLE SITES: Chronic | ICD-10-CM

## 2017-12-05 DIAGNOSIS — I65.23 ATHEROSCLEROSIS OF BOTH CAROTID ARTERIES: Chronic | ICD-10-CM

## 2017-12-05 DIAGNOSIS — Z51.81 THERAPEUTIC DRUG MONITORING: ICD-10-CM

## 2017-12-05 DIAGNOSIS — D75.89 MACROCYTOSIS: ICD-10-CM

## 2017-12-05 DIAGNOSIS — K57.30 DIVERTICULOSIS OF LARGE INTESTINE WITHOUT HEMORRHAGE: Chronic | ICD-10-CM

## 2017-12-05 DIAGNOSIS — R00.2 HEART PALPITATIONS: Chronic | ICD-10-CM

## 2017-12-05 DIAGNOSIS — E55.9 VITAMIN D DEFICIENCY: Chronic | ICD-10-CM

## 2017-12-05 DIAGNOSIS — N95.1 MENOPAUSAL STATE: ICD-10-CM

## 2017-12-05 PROBLEM — N30.00 ACUTE CYSTITIS WITHOUT HEMATURIA: Status: RESOLVED | Noted: 2017-08-29 | Resolved: 2017-12-05

## 2017-12-05 PROCEDURE — 99214 OFFICE O/P EST MOD 30 MIN: CPT | Performed by: INTERNAL MEDICINE

## 2017-12-05 RX ORDER — METOPROLOL SUCCINATE 25 MG/1
TABLET, EXTENDED RELEASE ORAL
Qty: 90 TABLET | Refills: 3 | Status: SHIPPED | OUTPATIENT
Start: 2017-12-05 | End: 2018-12-10 | Stop reason: SDUPTHER

## 2017-12-05 RX ORDER — HYDROCHLOROTHIAZIDE 25 MG/1
TABLET ORAL
Qty: 90 TABLET | Refills: 3
Start: 2017-12-05 | End: 2018-01-21 | Stop reason: SDUPTHER

## 2017-12-05 RX ORDER — ATORVASTATIN CALCIUM 40 MG/1
TABLET, FILM COATED ORAL
Qty: 90 TABLET | Refills: 3 | Status: SHIPPED | OUTPATIENT
Start: 2017-12-05 | End: 2018-01-03 | Stop reason: SDUPTHER

## 2017-12-05 RX ORDER — ATORVASTATIN CALCIUM 80 MG/1
80 TABLET, FILM COATED ORAL NIGHTLY
Qty: 90 TABLET | Refills: 3 | Status: SHIPPED | OUTPATIENT
Start: 2017-12-05 | End: 2017-12-05

## 2017-12-06 LAB
FOLATE BLD-MCNC: 546.5 NG/ML
FOLATE RBC-MCNC: 1461 NG/ML
HCT VFR BLD AUTO: 37.4 % (ref 34–46.6)
VIT B12 SERPL-MCNC: 458 PG/ML (ref 211–946)

## 2017-12-08 ENCOUNTER — TELEPHONE (OUTPATIENT)
Dept: INTERNAL MEDICINE | Facility: CLINIC | Age: 82
End: 2017-12-08

## 2017-12-08 LAB
2ME-CITRATE SERPL-MCNC: 228 NMOL/L (ref 60–228)
CYSTATHIONIN SERPL-SCNC: 143 NMOL/L (ref 44–342)
HCYS SERPL-SCNC: 6.6 UMOL/L (ref 5.1–13.9)
Lab: NORMAL
Lab: NORMAL
METHYLMALONATE SERPL-SCNC: 242 NMOL/L (ref 0–378)
T3FREE SERPL-MCNC: 3.1 PG/ML (ref 2–4.4)
T4 FREE SERPL-MCNC: 1.15 NG/DL (ref 0.93–1.7)
THYROGLOB AB SERPL-ACNC: <1 IU/ML (ref 0–0.9)
THYROPEROXIDASE AB SERPL-ACNC: 9 IU/ML (ref 0–34)
TSH SERPL DL<=0.005 MIU/L-ACNC: 3.13 MIU/ML (ref 0.27–4.2)

## 2017-12-08 NOTE — TELEPHONE ENCOUNTER
----- Message from Jeovany Dietrich MD sent at 12/8/2017 11:24 AM EST -----  Call patient with normal results.  Inform patient she does not have a B12 or folic acid deficiency or if she is being treated for this, she is in the therapeutic range.  Her thyroid function tests returned normal.  We will monitor.      I called and left pt a msg per   so she would have results before the weekend. She is not being treated for b12 def.

## 2017-12-08 NOTE — TELEPHONE ENCOUNTER
----- Message from Jeovany Dietrich MD sent at 12/8/2017 11:24 AM EST -----  Call patient with normal results.  Inform patient she does not have a B12 or folic acid deficiency or if she is being treated for this, she is in the therapeutic range.  Her thyroid function tests returned normal.  We will monitor.      LM that labs were normal and to keep everything the same. Also said that we would monitor her thyroid.

## 2018-01-03 DIAGNOSIS — E78.5 HYPERLIPIDEMIA, UNSPECIFIED HYPERLIPIDEMIA TYPE: Chronic | ICD-10-CM

## 2018-01-03 RX ORDER — ATORVASTATIN CALCIUM 40 MG/1
TABLET, FILM COATED ORAL
Qty: 90 TABLET | Refills: 3 | Status: SHIPPED | OUTPATIENT
Start: 2018-01-03 | End: 2019-01-14 | Stop reason: SDUPTHER

## 2018-01-09 RX ORDER — AMLODIPINE BESYLATE AND BENAZEPRIL HYDROCHLORIDE 5; 40 MG/1; MG/1
CAPSULE ORAL
Qty: 90 CAPSULE | Refills: 1 | Status: SHIPPED | OUTPATIENT
Start: 2018-01-09 | End: 2018-07-08 | Stop reason: SDUPTHER

## 2018-01-21 DIAGNOSIS — I10 BENIGN ESSENTIAL HYPERTENSION: Chronic | ICD-10-CM

## 2018-01-22 RX ORDER — HYDROCHLOROTHIAZIDE 25 MG/1
TABLET ORAL
Qty: 90 TABLET | Refills: 1 | Status: SHIPPED | OUTPATIENT
Start: 2018-01-22 | End: 2018-07-21 | Stop reason: SDUPTHER

## 2018-06-06 DIAGNOSIS — E78.5 HYPERLIPIDEMIA, UNSPECIFIED HYPERLIPIDEMIA TYPE: Chronic | ICD-10-CM

## 2018-06-06 DIAGNOSIS — D75.89 MACROCYTOSIS: ICD-10-CM

## 2018-06-06 DIAGNOSIS — E55.9 VITAMIN D DEFICIENCY: Chronic | ICD-10-CM

## 2018-06-06 DIAGNOSIS — E03.9 HYPOTHYROIDISM, UNSPECIFIED TYPE: ICD-10-CM

## 2018-06-10 LAB
25(OH)D3+25(OH)D2 SERPL-MCNC: 28.3 NG/ML (ref 30–100)
ALBUMIN SERPL-MCNC: 4.2 G/DL (ref 3.5–5.2)
ALBUMIN/GLOB SERPL: 1.4 G/DL
ALP SERPL-CCNC: 81 U/L (ref 39–117)
ALT SERPL-CCNC: 21 U/L (ref 1–33)
AST SERPL-CCNC: 20 U/L (ref 1–32)
BILIRUB SERPL-MCNC: 0.4 MG/DL (ref 0.1–1.2)
BUN SERPL-MCNC: 21 MG/DL (ref 8–23)
BUN/CREAT SERPL: 22.1 (ref 7–25)
CALCIUM SERPL-MCNC: 9.7 MG/DL (ref 8.6–10.5)
CHLORIDE SERPL-SCNC: 99 MMOL/L (ref 98–107)
CHOLEST SERPL-MCNC: 142 MG/DL (ref 100–199)
CK SERPL-CCNC: 63 U/L (ref 20–180)
CO2 SERPL-SCNC: 28.2 MMOL/L (ref 22–29)
CREAT SERPL-MCNC: 0.95 MG/DL (ref 0.57–1)
ERYTHROCYTE [DISTWIDTH] IN BLOOD BY AUTOMATED COUNT: 13.1 % (ref 11.7–13)
GFR SERPLBLD CREATININE-BSD FMLA CKD-EPI: 56 ML/MIN/1.73
GFR SERPLBLD CREATININE-BSD FMLA CKD-EPI: 68 ML/MIN/1.73
GLOBULIN SER CALC-MCNC: 2.9 GM/DL
GLUCOSE SERPL-MCNC: 102 MG/DL (ref 65–99)
HCT VFR BLD AUTO: 40.2 % (ref 35.6–45.5)
HDL SERPL-SCNC: 40.1 UMOL/L
HDLC SERPL-MCNC: 61 MG/DL
HGB BLD-MCNC: 12.9 G/DL (ref 11.9–15.5)
LDL SERPL QN: 21.2 NM
LDL SERPL-SCNC: 722 NMOL/L
LDL SMALL SERPL-SCNC: 289 NMOL/L
LDLC SERPL CALC-MCNC: 70 MG/DL (ref 0–99)
MCH RBC QN AUTO: 31.5 PG (ref 26.9–32)
MCHC RBC AUTO-ENTMCNC: 32.1 G/DL (ref 32.4–36.3)
MCV RBC AUTO: 98 FL (ref 80.5–98.2)
PLATELET # BLD AUTO: 315 10*3/MM3 (ref 140–500)
POTASSIUM SERPL-SCNC: 4.7 MMOL/L (ref 3.5–5.2)
PROT SERPL-MCNC: 7.1 G/DL (ref 6–8.5)
RBC # BLD AUTO: 4.1 10*6/MM3 (ref 3.9–5.2)
SODIUM SERPL-SCNC: 140 MMOL/L (ref 136–145)
T3FREE SERPL-MCNC: 3.1 PG/ML (ref 2–4.4)
T4 FREE SERPL-MCNC: 1.12 NG/DL (ref 0.93–1.7)
TRIGL SERPL-MCNC: 53 MG/DL (ref 0–149)
TSH SERPL DL<=0.005 MIU/L-ACNC: 3.49 MIU/ML (ref 0.27–4.2)
WBC # BLD AUTO: 6.82 10*3/MM3 (ref 4.5–10.7)

## 2018-06-14 ENCOUNTER — OFFICE VISIT (OUTPATIENT)
Dept: INTERNAL MEDICINE | Facility: CLINIC | Age: 83
End: 2018-06-14

## 2018-06-14 VITALS
OXYGEN SATURATION: 98 % | WEIGHT: 146.4 LBS | RESPIRATION RATE: 18 BRPM | SYSTOLIC BLOOD PRESSURE: 127 MMHG | DIASTOLIC BLOOD PRESSURE: 84 MMHG | HEIGHT: 62 IN | BODY MASS INDEX: 26.94 KG/M2 | HEART RATE: 64 BPM

## 2018-06-14 DIAGNOSIS — E03.9 HYPOTHYROIDISM, UNSPECIFIED TYPE: Chronic | ICD-10-CM

## 2018-06-14 DIAGNOSIS — N32.89 IRRITABLE BLADDER: Chronic | ICD-10-CM

## 2018-06-14 DIAGNOSIS — Z51.81 THERAPEUTIC DRUG MONITORING: ICD-10-CM

## 2018-06-14 DIAGNOSIS — Z00.00 MEDICARE ANNUAL WELLNESS VISIT, SUBSEQUENT: Primary | ICD-10-CM

## 2018-06-14 DIAGNOSIS — R35.0 URINARY FREQUENCY: ICD-10-CM

## 2018-06-14 DIAGNOSIS — I10 BENIGN ESSENTIAL HYPERTENSION: Chronic | ICD-10-CM

## 2018-06-14 DIAGNOSIS — D75.89 MACROCYTOSIS: Chronic | ICD-10-CM

## 2018-06-14 DIAGNOSIS — K58.9 IRRITABLE BOWEL SYNDROME WITHOUT DIARRHEA: Chronic | ICD-10-CM

## 2018-06-14 DIAGNOSIS — I65.23 ATHEROSCLEROSIS OF BOTH CAROTID ARTERIES: Chronic | ICD-10-CM

## 2018-06-14 DIAGNOSIS — M85.89 OSTEOPENIA OF MULTIPLE SITES: Chronic | ICD-10-CM

## 2018-06-14 DIAGNOSIS — R10.32 ABDOMINAL PAIN, LEFT LOWER QUADRANT: ICD-10-CM

## 2018-06-14 DIAGNOSIS — R73.01 IMPAIRED FASTING GLUCOSE: ICD-10-CM

## 2018-06-14 DIAGNOSIS — E55.9 VITAMIN D DEFICIENCY: Chronic | ICD-10-CM

## 2018-06-14 DIAGNOSIS — E78.5 HYPERLIPIDEMIA, UNSPECIFIED HYPERLIPIDEMIA TYPE: Chronic | ICD-10-CM

## 2018-06-14 PROCEDURE — 99214 OFFICE O/P EST MOD 30 MIN: CPT | Performed by: INTERNAL MEDICINE

## 2018-06-14 PROCEDURE — G0439 PPPS, SUBSEQ VISIT: HCPCS | Performed by: INTERNAL MEDICINE

## 2018-06-14 NOTE — PROGRESS NOTES
06/14/2018    Patient Information  Stephanie Hawkins                                                                                          [address] [facility] [address]      12/16/1932        Chief Complaint:     Subsequent Medicare wellness visit.  Follow-up hyperlipidemia, carotid artery plaque, hypertension, irritable bowel syndrome, osteopenia, vitamin D deficiency, irritable bladder, macrocytosis, hypothyroidism.  Complaining of urinary frequency and also complaining of left lower quadrant abdominal pain.    History of Present Illness:    Patient with a history of medical problems as outlined in the chief complaint of been fairly stable now for at least the past year.  She gave her subsequent Medicare wellness visit.  She had lab work in order to monitor her chronic medical issues as well.  She has complaints of left lower quadrant abdominal pain as described below.  Patient also had a urinary tract infection last year and she is concerned that it may be still present.  She is complaining of urinary frequency without dysuria.  Past medical history reviewed and updated where necessary including health maintenance parameters.  This reveals she is up-to-date or else accounted for.    The history regarding left lower quadrant abdominal pain:    06/14/2018--patient presents with intermittent left lower quadrant abdominal pain past 6 months.  Described as an ache and not associated with change in bowel habits, nausea, vomiting, fever, chills, rectal bleeding, or any systemic signs or symptoms.  Exam reveals some tenderness to deep palpation in the left lower quadrant.  Given this and the duration of the symptoms, we will proceed with CT scan to rule out pathology.    Review of Systems   Constitution: Negative.   HENT: Negative.    Eyes: Negative.    Cardiovascular: Negative.    Respiratory: Negative.    Endocrine: Negative.    Hematologic/Lymphatic: Negative.    Skin: Negative.    Musculoskeletal:  Negative.    Gastrointestinal: Positive for abdominal pain and dysphagia. Negative for change in bowel habit, bowel incontinence, constipation, diarrhea, hematochezia, melena, nausea and vomiting.   Genitourinary: Positive for frequency.   Neurological: Negative.    Psychiatric/Behavioral: Negative.    Allergic/Immunologic: Negative.        Active Problems:    Patient Active Problem List   Diagnosis   • Benign essential hypertension   • Carotid artery plaque, 05/30/2017--16-49% left.  Mild plaque right.  05/30/2014--mild bilateral carotid plaque.   • Diverticulosis of colon   • Heart palpitations   • Hyperlipidemia   • Irritable bowel syndrome   • Primary osteoarthritis of left knee   • Osteopenia, 05/30/2014--lumbar spine 0.6.  Right femoral neck -1.2.  Left femoral neck -1.7.   • Vitamin D deficiency   • Therapeutic drug monitoring   • Glaucoma, bilateral   • Menopausal state   • Irritable bladder   • Macrocytosis   • Hypothyroidism   • Urinary frequency   • Abdominal pain, left lower quadrant   • Impaired fasting glucose         Past Medical History:   Diagnosis Date   • Benign essential hypertension 2/26/2001 02/26/2001--patient was seen as a new patient. She was ordered being treated for hypertension at that time.   • Carotid artery plaque, 05/30/2014--mild bilateral carotid plaque. 1/8/2002 05/30/2014--vascular screen reveals mild bilateral carotid plaque, negative for AAA, negative for PAD.   06/19/2012--vascular screen revealed bilateral, less than 50% internal carotid artery stenosis, negative for AAA, negative for PAD.   05/10/2010--carotid Doppler revealed 16-49% stenosis in the right and left internal carotid arteries.   01/08/2002--vascular screen revealed mild bilateral carotid artery plaque, negative for AAA, negative for PAD.   • Diverticulosis of colon 10/16/2001    11/03/2006--the entire examined colon was normal except for scattered diverticuli.   10/16/2001--colonoscopy revealed a few  left-sided scattered diverticuli.   • Glaucoma, bilateral 6/1/2016 06/01/2016--routine follow-up.  She reports she was diagnosed with glaucoma about a month or so ago treated with Travatan eyedrops.   • Heart palpitations 10/13/2008    Patient has at a long history of heart palpitations.   10/13/2008--24-hour Holter monitor reveals an average heart rate of 69, a minimum heart rate was 55. Maximum heart rate was 98. There were no pauses. Ventricular ectopy was zero. Supraventricular ectopy was 5585, with 15 supraventricular runs. Supraventricular bigeminy events were 6 and supraventricular trigeminy events were one. There was no atrial fibrillation/flutter. No ST segment changes. Patient's heart palpitations related to frequent PACs which have been controlled with atenolol.   • Hyperlipidemia 2/26/2001 02/26/2001--patient was seen as a new patient. She was already on cholesterol medication at that time.   • Impaired fasting glucose 6/14/2018 06/14/2018--routine follow-up.  Fasting serum glucose mildly elevated at 102.  Recommend low carbohydrate diet and weight loss.  Exercise.   • Irritable bladder 8/29/2017 08/29/2017--patient seen in follow-up after recent urinary tract infection with complaints of nocturia ×4-5 per night as well as frequency and urgency.  Repeat urinalysis is negative.  Urine culture sent.  Given the fact the patient is had the urinary symptoms prior to the onset of the recent UTI, I will treat her empirically for irritable bladder.  Myrbetriq 25 mg per day.  May increase to 50 mg per day if necessary.  I will have her follow-up in about 3 weeks to reassess his situation.   • Irritable bowel syndrome 4/27/2016   • Macrocytosis 12/5/2017 12/05/2017--routine follow-up.  CBC is normal except MCV elevated at 99.5.  Homocystine, methylmalonic acid, serum B-12 and folic acid levels ordered.   • Menopausal state 5/17/2017   • Osteopenia, 05/30/2014--lumbar spine 0.6.  Right femoral neck  -1.2.  Left femoral neck -1.7. 6/9/2006 05/30/2014--DEXA scan reveals average lumbar spine T score of 0.6. Right femoral neck T score -1.2. Left femoral neck T score -1.7. Osteopenia. Compared to the previous examination of 11/09/2010, there is a 1% decrease in the left hip bone mineral density, 2.1% decrease in the right hip bone density, and a 1.6% improvement in the lumbar spine bone density.   11/22/2010--treatment for osteopenia begun with Fosamax but patient discontinued it on her own.   11/09/2010--DEXA scan revealed average lumbar spine T score 0.5. Left femoral neck T score -1.4. Right femoral neck T score -1.1. Osteopenia.   06/09/2006--DEXA scan reveals lumbar spine T score 0.6. Left hip T score -0.8, left femoral neck T score -1.3.   • Primary osteoarthritis of left knee 3/23/2004    03/23/2004--patient was evaluated by the orthopedist for left knee pain. X-rays revealed osteoarthritis with some medial joint space narrowing, subchondral sclerosis, and patellofemoral spurs. Cortisone injections were given.   • Vitamin D deficiency 4/27/2016         Past Surgical History:   Procedure Laterality Date   • CATARACT EXTRACTION Right 01/2014 January 2014--cataract extirpation and intraocular lens implantation right eye.    • CATARACT EXTRACTION Left 09/2010 September 2010--cataract extirpation and intraocular lens implantation in the left eye.   • COLONOSCOPY  11/03/2006 11/03/2006--the entire examined colon was normal except for scattered diverticuli.    • COLONOSCOPY  10/16/2001    10/16/2001--colonoscopy revealed a few left-sided scattered diverticuli.         Allergies   Allergen Reactions   • Neomycin-Bacitracin Zn-Polymyx    • Sulfa Antibiotics            Current Outpatient Prescriptions:   •  amLODIPine-benazepril (LOTREL) 5-40 MG per capsule, TAKE 1 CAPSULE DAILY, Disp: 90 capsule, Rfl: 1  •  aspirin 81 MG tablet, Take  by mouth., Disp: , Rfl:   •  atorvastatin (LIPITOR) 40 MG tablet, 1 by  "mouth daily for cholesterol, Disp: 90 tablet, Rfl: 3  •  Garlic 1000 MG capsule, Take  by mouth., Disp: , Rfl:   •  hydrochlorothiazide (HYDRODIURIL) 25 MG tablet, TAKE 1 TABLET DAILY (NEED LABS AND FOLLOW UP AS SOON AS POSSIBLE), Disp: 90 tablet, Rfl: 1  •  Multiple Vitamin (MULTI VITAMIN DAILY PO), Take  by mouth daily., Disp: , Rfl:   •  TRAVATAN Z 0.004 % solution ophthalmic solution, , Disp: , Rfl:   •  Cholecalciferol (VITAMIN D3) 5000 UNITS capsule capsule, 1 by mouth daily as directed, Disp: 30 capsule, Rfl:   •  metoprolol succinate XL (TOPROL-XL) 25 MG 24 hr tablet, 1 by mouth daily for blood pressure and heart palpitations., Disp: 90 tablet, Rfl: 3      Family History   Problem Relation Age of Onset   • Cirrhosis Mother         Of Liver. Mother  at age 62 from alcoholic cirrhosis.   • Heart attack Father         Acute Myocardial Infarction.  Father  of a myocardial infarction at age 68.         Social History     Social History   • Marital status:      Spouse name: N/A   • Number of children: N/A   • Years of education: N/A     Occupational History   • Retired () [employer]      Social History Main Topics   • Smoking status: Never Smoker   • Smokeless tobacco: Never Used   • Alcohol use No   • Drug use: No   • Sexual activity: Not Currently     Partners: Male     Other Topics Concern   • Not on file     Social History Narrative   • No narrative on file         Vitals:    18 1247   BP: 127/84   BP Location: Right arm   Patient Position: Sitting   Cuff Size: Adult   Pulse: 64   Resp: 18   SpO2: 98%   Weight: 66.4 kg (146 lb 6.4 oz)   Height: 157.5 cm (62.01\")          Physical Exam:    General: Alert and oriented x 3.  No acute distress.  Normal affect.  HEENT: Pupils equal, round, reactive to light; extraocular movements intact; sclerae nonicteric; pharynx, ear canals and TMs normal.  Neck: Without JVD, thyromegaly, bruit, or adenopathy.  Lungs: Clear to " auscultation in all fields.  Heart: Regular rate and rhythm without murmur, rub, gallop, or click.  Abdomen: Soft, but there is tenderness to deep palpation in the left lower quadrant without hepatosplenomegaly or hernia.  Bowel sounds normal.  : Deferred.  Rectal: Deferred.  Extremities: Without clubbing, cyanosis, edema, or pulse deficit.  Neurologic: Intact without focal deficit.  Normal station and gait observed during ingress and egress from the examination room.  Skin: Without significant lesion.  Musculoskeletal: Unremarkable.      Lab/other results:    NMR is absolutely perfect.  CMP normal except blood sugar slightly elevated at 102.  CBC essentially normal.  Function tests are normal.  Vitamin D is a little low at 28.3.  CPK normal.    Assessment/Plan:     Diagnosis Plan   1. Medicare annual wellness visit, subsequent     2. Hyperlipidemia, unspecified hyperlipidemia type     3. Carotid artery plaque, 05/30/2017--16-49% left.  Mild plaque right.  05/30/2014--mild bilateral carotid plaque.     4. Benign essential hypertension     5. Irritable bowel syndrome without diarrhea     6. Osteopenia of multiple sites     7. Vitamin D deficiency     8. Irritable bladder     9. Macrocytosis     10. Hypothyroidism, unspecified type     11. Therapeutic drug monitoring     12. Urinary frequency     13. Abdominal pain, left lower quadrant     14. Impaired fasting glucose       The Medicare wellness visit is documented on a separate note.    Patient has hyperlipidemia that under excellent control.  She has carotid artery plaque that was assessed about one year ago with a Doppler study.  We will plan on repeating this in 1 year.  Blood pressure appears to be controlled.  Patient does have a history of IBS but she has new complaints of left lower quadrant abdominal pain that seemed to be unrelated.  She has osteopenia and had a DEXA scan several years ago.  I noticed that an order was placed for one last year as well as  the year before last but never done.her vitamin D is a little bit low when I encouraged her to take 5000 units per day.  Patient does have irritable bladder and had a urinary tract infection last year.  She is complaining of urinary frequency and is concerned she may have a recurrence of urinary tract infection.  Macrocytosis has been evaluated in the past and appears to have resolved.  The diagnosis of hypothyroidism and is suspected but we will continue to monitor.  She has new complaints of left lower quadrant abdominal pain that need further evaluation, particularly given her history of diverticulosis.    Plan is as follows: Check urinalysis and urine culture.  Schedule CT scan of the abdomen and pelvis with contrast.  DEXA scan ordered.  I will have patient follow-up after the results are known.    Procedures

## 2018-06-14 NOTE — PROGRESS NOTES
QUICK REFERENCE INFORMATION:  The ABCs of the Annual Wellness Visit    Subsequent Medicare Wellness Visit    HEALTH RISK ASSESSMENT    12/16/1932    Recent Hospitalizations:  No hospitalization(s) within the last year..        Current Medical Providers:  Patient Care Team:  Jeovany Dietrich MD as PCP - General (Internal Medicine)        Smoking Status:  History   Smoking Status   • Never Smoker   Smokeless Tobacco   • Never Used       Alcohol Consumption:  History   Alcohol Use No       Depression Screen:   PHQ-2/PHQ-9 Depression Screening 6/14/2018   Little interest or pleasure in doing things 0   Feeling down, depressed, or hopeless 0   Trouble falling or staying asleep, or sleeping too much -   Feeling tired or having little energy -   Poor appetite or overeating -   Feeling bad about yourself - or that you are a failure or have let yourself or your family down -   Trouble concentrating on things, such as reading the newspaper or watching television -   Moving or speaking so slowly that other people could have noticed. Or the opposite - being so fidgety or restless that you have been moving around a lot more than usual -   Thoughts that you would be better off dead, or of hurting yourself in some way -   Total Score 0   If you checked off any problems, how difficult have these problems made it for you to do your work, take care of things at home, or get along with other people? -       Health Habits and Functional and Cognitive Screening:  Functional & Cognitive Status 6/14/2018   Do you have difficulty preparing food and eating? No   Do you have difficulty bathing yourself, getting dressed or grooming yourself? No   Do you have difficulty using the toilet? No   Do you have difficulty moving around from place to place? No   Do you have trouble with steps or getting out of a bed or a chair? No   In the past year have you fallen or experienced a near fall? No   Current Diet Low Fat Diet   Dental Exam Not up to  date   Eye Exam Up to date   Exercise (times per week) 5 times per week   Current Exercise Activities Include Cardiovasular Workout on Exercise Equipment   Do you need help using the phone?  No   Are you deaf or do you have serious difficulty hearing?  No   Do you need help with transportation? No   Do you need help shopping? No   Do you need help preparing meals?  No   Do you need help with housework?  No   Do you need help with laundry? No   Do you need help taking your medications? Yes   Do you need help managing money? No   Do you ever drive or ride in a car without wearing a seat belt? No   Have you felt unusual stress, anger or loneliness in the last month? No   Who do you live with? Spouse   If you need help, do you have trouble finding someone available to you? No   Have you been bothered in the last four weeks by sexual problems? No   Do you have difficulty concentrating, remembering or making decisions? No           Does the patient have evidence of cognitive impairment? No    Aspirin use counseling: Taking ASA appropriately as indicated      Recent Lab Results:  CMP:  Lab Results   Component Value Date     (H) 06/07/2018    BUN 21 06/07/2018    CREATININE 0.95 06/07/2018    EGFRIFNONA 56 (L) 06/07/2018    EGFRIFAFRI 68 06/07/2018    BCR 22.1 06/07/2018     06/07/2018    K 4.7 06/07/2018    CO2 28.2 06/07/2018    CALCIUM 9.7 06/07/2018    PROTENTOTREF 7.1 06/07/2018    ALBUMIN 4.20 06/07/2018    LABGLOBREF 2.9 06/07/2018    LABIL2 1.4 06/07/2018    BILITOT 0.4 06/07/2018    ALKPHOS 81 06/07/2018    AST 20 06/07/2018    ALT 21 06/07/2018     Lipid Panel:  Lab Results   Component Value Date    TRIG 53 06/07/2018     HbA1c:       Visual Acuity:  No exam data present    Age-appropriate Screening Schedule:  Refer to the list below for future screening recommendations based on patient's age, sex and/or medical conditions. Orders for these recommended tests are listed in the plan section. The patient  has been provided with a written plan.    Health Maintenance   Topic Date Due   • DXA SCAN  06/16/2018   • INFLUENZA VACCINE  08/01/2018   • LIPID PANEL  06/07/2019   • MAMMOGRAM  12/01/2019   • TDAP/TD VACCINES (2 - Td) 05/17/2027   • PNEUMOCOCCAL VACCINES (65+ LOW/MEDIUM RISK)  Completed   • ZOSTER VACCINE  Excluded        Subjective   History of Present Illness    Stephanie Hawkins is a 85 y.o. female who presents for an Subsequent Wellness Visit.    The following portions of the patient's history were reviewed and updated as appropriate: allergies, current medications, past family history, past medical history, past social history, past surgical history and problem list.    Outpatient Medications Prior to Visit   Medication Sig Dispense Refill   • amLODIPine-benazepril (LOTREL) 5-40 MG per capsule TAKE 1 CAPSULE DAILY 90 capsule 1   • aspirin 81 MG tablet Take  by mouth.     • atorvastatin (LIPITOR) 40 MG tablet 1 by mouth daily for cholesterol 90 tablet 3   • Garlic 1000 MG capsule Take  by mouth.     • hydrochlorothiazide (HYDRODIURIL) 25 MG tablet TAKE 1 TABLET DAILY (NEED LABS AND FOLLOW UP AS SOON AS POSSIBLE) 90 tablet 1   • Multiple Vitamin (MULTI VITAMIN DAILY PO) Take  by mouth daily.     • TRAVATAN Z 0.004 % solution ophthalmic solution      • Cholecalciferol (VITAMIN D3) 5000 UNITS capsule capsule 1 by mouth daily as directed 30 capsule    • metoprolol succinate XL (TOPROL-XL) 25 MG 24 hr tablet 1 by mouth daily for blood pressure and heart palpitations. 90 tablet 3     No facility-administered medications prior to visit.        Patient Active Problem List   Diagnosis   • Benign essential hypertension   • Carotid artery plaque, 05/30/2017--16-49% left.  Mild plaque right.  05/30/2014--mild bilateral carotid plaque.   • Diverticulosis of colon   • Heart palpitations   • Hyperlipidemia   • Irritable bowel syndrome   • Primary osteoarthritis of left knee   • Osteopenia, 05/30/2014--lumbar spine 0.6.  Right  femoral neck -1.2.  Left femoral neck -1.7.   • Vitamin D deficiency   • Therapeutic drug monitoring   • Glaucoma, bilateral   • Menopausal state   • Irritable bladder   • Macrocytosis   • Hypothyroidism   • Urinary frequency   • Abdominal pain, left lower quadrant   • Impaired fasting glucose       Advance Care Planning:  has NO advance directive - information provided to the patient today    Identification of Risk Factors:  Risk factors include: weight  and cardiovascular risk.    Review of Systems   Gastrointestinal: Positive for abdominal pain. Negative for blood in stool, constipation, diarrhea, nausea, rectal pain and vomiting.   Genitourinary: Positive for frequency. Negative for dysuria.   All other systems reviewed and are negative.      Compared to one year ago, the patient feels her physical health is the same.  Compared to one year ago, the patient feels her mental health is the same.    Objective       Physical Exam     General: Alert and oriented x 3.  No acute distress.  Normal affect.  HEENT: Pupils equal, round, reactive to light; extraocular movements intact; sclerae nonicteric; pharynx, ear canals and TMs normal.  Neck: Without JVD, thyromegaly, bruit, or adenopathy.  Lungs: Clear to auscultation in all fields.  Heart: Regular rate and rhythm without murmur, rub, gallop, or click.  Abdomen: Soft, but there is tenderness to deep palpation in the left lower quadrant without hepatosplenomegaly or hernia.  Bowel sounds normal.  : Deferred.  Rectal: Deferred.  Extremities: Without clubbing, cyanosis, edema, or pulse deficit.  Neurologic: Intact without focal deficit.  Normal station and gait observed during ingress and egress from the examination room.  Skin: Without significant lesion.  Musculoskeletal: Unremarkable.    Vitals:    06/14/18 1247   BP: 127/84   BP Location: Right arm   Patient Position: Sitting   Cuff Size: Adult   Pulse: 64   Resp: 18   SpO2: 98%   Weight: 66.4 kg (146 lb 6.4 oz)  "  Height: 157.5 cm (62.01\")   PainSc: 0-No pain       Patient's Body mass index is 26.77 kg/m². BMI is above normal parameters. Recommendations include: exercise counseling, nutrition counseling and referral to primary care.      Assessment/Plan   Patient Self-Management and Personalized Health Advice  The patient has been provided with information about: diet, exercise, weight management, prevention of cardiac or vascular disease, fall prevention and designing advance directives and preventive services including:   · Bone densitometry screening, Diabetes screening, see lab orders, Exercise counseling provided, Fall Risk assessment done, Glaucoma screening recommended, Nutrition counseling provided.    Visit Diagnoses:    ICD-10-CM ICD-9-CM   1. Medicare annual wellness visit, subsequent Z00.00 V70.0   2. Hyperlipidemia, unspecified hyperlipidemia type E78.5 272.4   3. Carotid artery plaque, 05/30/2017--16-49% left.  Mild plaque right.  05/30/2014--mild bilateral carotid plaque. I65.23 433.10     433.30   4. Benign essential hypertension I10 401.1   5. Irritable bowel syndrome without diarrhea K58.9 564.1   6. Osteopenia of multiple sites M85.89 733.90   7. Vitamin D deficiency E55.9 268.9   8. Irritable bladder N32.89 596.89   9. Macrocytosis D75.89 289.89   10. Hypothyroidism, unspecified type E03.9 244.9   11. Therapeutic drug monitoring Z51.81 V58.83   12. Urinary frequency R35.0 788.41   13. Abdominal pain, left lower quadrant R10.32 789.04   14. Impaired fasting glucose R73.01 790.21       Orders Placed This Encounter   Procedures   • Urine Culture - Urine, Urine, Clean Catch   • CT Abdomen Pelvis With Contrast     Order Specific Question:   Does the patient require sedation?     Answer:   No     Order Specific Question:   Will Oral Contrast be needed for this procedure?     Answer:   No   • DEXA Bone Density Axial     Order Specific Question:   Reason for Exam:     Answer:   follow-up osteopenia   • " Urinalysis With / Microscopic If Indicated (No Culture) - Urine, Clean Catch       Outpatient Encounter Prescriptions as of 6/14/2018   Medication Sig Dispense Refill   • amLODIPine-benazepril (LOTREL) 5-40 MG per capsule TAKE 1 CAPSULE DAILY 90 capsule 1   • aspirin 81 MG tablet Take  by mouth.     • atorvastatin (LIPITOR) 40 MG tablet 1 by mouth daily for cholesterol 90 tablet 3   • Garlic 1000 MG capsule Take  by mouth.     • hydrochlorothiazide (HYDRODIURIL) 25 MG tablet TAKE 1 TABLET DAILY (NEED LABS AND FOLLOW UP AS SOON AS POSSIBLE) 90 tablet 1   • Multiple Vitamin (MULTI VITAMIN DAILY PO) Take  by mouth daily.     • TRAVATAN Z 0.004 % solution ophthalmic solution      • Cholecalciferol (VITAMIN D3) 5000 UNITS capsule capsule 1 by mouth daily as directed 30 capsule    • metoprolol succinate XL (TOPROL-XL) 25 MG 24 hr tablet 1 by mouth daily for blood pressure and heart palpitations. 90 tablet 3     No facility-administered encounter medications on file as of 6/14/2018.        Reviewed use of high risk medication in the elderly: yes  Reviewed for potential of harmful drug interactions in the elderly: yes    Follow Up:  No Follow-up on file.     An After Visit Summary and PPPS with all of these plans were given to the patient.

## 2018-06-20 ENCOUNTER — HOSPITAL ENCOUNTER (OUTPATIENT)
Dept: BONE DENSITY | Facility: HOSPITAL | Age: 83
Discharge: HOME | End: 2018-06-20

## 2018-06-20 ENCOUNTER — HOSPITAL ENCOUNTER (OUTPATIENT)
Dept: CT IMAGING | Facility: HOSPITAL | Age: 83
Discharge: HOME | End: 2018-06-20
Admitting: INTERNAL MEDICINE

## 2018-06-20 LAB — CREAT BLDA-MCNC: 0.8 MG/DL (ref 0.6–1.3)

## 2018-06-20 PROCEDURE — 74177 CT ABD & PELVIS W/CONTRAST: CPT

## 2018-06-20 PROCEDURE — 82565 ASSAY OF CREATININE: CPT

## 2018-06-20 PROCEDURE — 77080 DXA BONE DENSITY AXIAL: CPT

## 2018-06-20 PROCEDURE — 25010000002 IOPAMIDOL 61 % SOLUTION: Performed by: INTERNAL MEDICINE

## 2018-06-20 RX ADMIN — IOPAMIDOL 85 ML: 612 INJECTION, SOLUTION INTRAVENOUS at 08:42

## 2018-06-21 LAB
APPEARANCE UR: CLEAR
BACTERIA #/AREA URNS HPF: NORMAL /HPF
BACTERIA UR CULT: ABNORMAL
BACTERIA UR CULT: ABNORMAL
BILIRUB UR QL STRIP: NEGATIVE
CASTS URNS MICRO: NORMAL
COLOR UR: YELLOW
EPI CELLS #/AREA URNS HPF: NORMAL /HPF
GLUCOSE UR QL: NEGATIVE
HGB UR QL STRIP: NEGATIVE
KETONES UR QL STRIP: NEGATIVE
LEUKOCYTE ESTERASE UR QL STRIP: ABNORMAL
NITRITE UR QL STRIP: NEGATIVE
OTHER ANTIBIOTIC SUSC ISLT: ABNORMAL
PH UR STRIP: 7 [PH] (ref 5–8)
PROT UR QL STRIP: NEGATIVE
RBC #/AREA URNS HPF: NORMAL /HPF
SP GR UR: 1.01 (ref 1–1.03)
UROBILINOGEN UR STRIP-MCNC: ABNORMAL MG/DL
WBC #/AREA URNS HPF: NORMAL /HPF

## 2018-06-22 ENCOUNTER — TELEPHONE (OUTPATIENT)
Dept: INTERNAL MEDICINE | Facility: CLINIC | Age: 83
End: 2018-06-22

## 2018-06-22 NOTE — TELEPHONE ENCOUNTER
Pt want the results of CT of abd / pelvis and her DEXA. I let pt know it would be Monday before wed call her back    [phone]

## 2018-06-25 RX ORDER — CIPROFLOXACIN 500 MG/1
500 TABLET, FILM COATED ORAL EVERY 12 HOURS SCHEDULED
Qty: 10 TABLET | Refills: 0 | Status: SHIPPED | OUTPATIENT
Start: 2018-06-25 | End: 2018-11-27

## 2018-06-27 ENCOUNTER — OFFICE VISIT (OUTPATIENT)
Dept: INTERNAL MEDICINE | Facility: CLINIC | Age: 83
End: 2018-06-27

## 2018-06-27 VITALS
HEART RATE: 66 BPM | DIASTOLIC BLOOD PRESSURE: 60 MMHG | OXYGEN SATURATION: 98 % | BODY MASS INDEX: 26.68 KG/M2 | HEIGHT: 62 IN | WEIGHT: 145 LBS | SYSTOLIC BLOOD PRESSURE: 134 MMHG

## 2018-06-27 DIAGNOSIS — K86.9 PANCREATIC LESION: ICD-10-CM

## 2018-06-27 DIAGNOSIS — E55.9 VITAMIN D DEFICIENCY: Chronic | ICD-10-CM

## 2018-06-27 DIAGNOSIS — E03.9 HYPOTHYROIDISM, UNSPECIFIED TYPE: Chronic | ICD-10-CM

## 2018-06-27 DIAGNOSIS — E78.5 HYPERLIPIDEMIA, UNSPECIFIED HYPERLIPIDEMIA TYPE: Chronic | ICD-10-CM

## 2018-06-27 DIAGNOSIS — I10 BENIGN ESSENTIAL HYPERTENSION: Chronic | ICD-10-CM

## 2018-06-27 DIAGNOSIS — D75.89 MACROCYTOSIS: Chronic | ICD-10-CM

## 2018-06-27 DIAGNOSIS — R10.32 ABDOMINAL PAIN, LEFT LOWER QUADRANT: Primary | ICD-10-CM

## 2018-06-27 DIAGNOSIS — M85.89 OSTEOPENIA OF MULTIPLE SITES: Chronic | ICD-10-CM

## 2018-06-27 DIAGNOSIS — R73.01 IMPAIRED FASTING GLUCOSE: Chronic | ICD-10-CM

## 2018-06-27 DIAGNOSIS — N39.0 ACUTE URINARY TRACT INFECTION: ICD-10-CM

## 2018-06-27 DIAGNOSIS — N94.89 PELVIC CONGESTION SYNDROME: ICD-10-CM

## 2018-06-27 DIAGNOSIS — Z51.81 THERAPEUTIC DRUG MONITORING: ICD-10-CM

## 2018-06-27 PROCEDURE — 99214 OFFICE O/P EST MOD 30 MIN: CPT | Performed by: INTERNAL MEDICINE

## 2018-06-27 NOTE — PROGRESS NOTES
06/27/2018    Patient Information  Stephanie Hawkins                                                                                          [id number]  [facility] [address]      12/16/1932        Chief Complaint:     Follow-up complaints of abdominal pain, recent CT scan of the abdomen and pelvis, urinary tract infection.  No new acute complaints.    History of Present Illness:    Patient with a history of hypertension, carotid artery plaque, diverticulosis of colon, hyperlipidemia, irritable bowel syndrome, osteopenia, irritable bladder, macrocytosis, hypothyroidism, impaired fasting glucose, recent evaluation for abdominal pain.  She presents today to follow-up on the abdominal pain and the discovery of possible urinary tract infection.  She had a CT scan of the abdomen and pelvis which we will review.  This will be described in detail below.  Her past medical history reviewed and updated where necessary including health maintenance parameters.  This reveals she is up-to-date or account for.    The history regarding abdominal pain, questionable pelvic congestion syndrome, lesion of the pancreas:    06/27/2018--patient seen in follow-up and the results of the CT scan discussed.  She currently is not having any abdominal pain but reports when it is present it is not severe and it is something that she just feels that is not right in the left lower quadrant.  I'm not sure with the prevalence of pelvic congestion syndrome is, particularly in an 85-year-old patient, but I think that it would be reasonable for her to be assessed by gynecologist.  In regards to the pancreatic lesion, it is an incidental finding and I don't think is related to her symptoms but certainly needs to be reevaluated with a repeat CT scan or MRI within one year.  Patient is aware.  Also patient had ,000 colony-forming units of bacteria in her urinalysis/urine culture and we are treating her with Cipro and she feels that  this may have made a difference and she feels better.  I will have her complete the course of Cipro and then we will repeat a urinalysis and urine culture.    06/20/2018--CT scan of the abdomen and pelvis with contrast reveals no acute abnormality.  Colonic diverticulosis without diverticulitis is present.  Prominent collateral vessels seen in the region of the bilateral bronchial ligaments, left greater than right.  Please correlate clinically for pelvic congestion syndrome.  A 4 mm hypodense lesion within the pancreas may represent a small pseudocyst, ductal ectasia or a small branch intraductal papillary mucinous neoplasm.  Follow-up recommended for CT scan or MRI in one year to reevaluate.    06/14/2018--patient presents with intermittent left lower quadrant abdominal pain past 6 months.  Described as an ache and not associated with change in bowel habits, nausea, vomiting, fever, chills, rectal bleeding, or any systemic signs or symptoms.  Exam reveals some tenderness to deep palpation in the left lower quadrant.  Given this and the duration of the symptoms, we will proceed with CT scan to rule out pathology.    Review of Systems   Constitution: Negative.   HENT: Negative.    Eyes: Negative.    Cardiovascular: Negative.    Respiratory: Negative.    Endocrine: Negative.    Hematologic/Lymphatic: Negative.    Skin: Negative.    Musculoskeletal: Negative.    Gastrointestinal: Positive for abdominal pain.   Genitourinary: Negative.    Neurological: Negative.    Psychiatric/Behavioral: Negative.    Allergic/Immunologic: Negative.        Active Problems:    Patient Active Problem List   Diagnosis   • Benign essential hypertension   • Carotid artery plaque, 05/30/2017--16-49% left.  Mild plaque right.  05/30/2014--mild bilateral carotid plaque.   • Diverticulosis of colon   • Heart palpitations   • Hyperlipidemia   • Irritable bowel syndrome   • Primary osteoarthritis of left knee   • Osteopenia, 06/20/2018--lumbar  spine 0.4.  Right femoral neck -1.1.  Left femoral neck -0.8.  05/30/2014--lumbar spine 0.6.  Right femoral neck -1.2.  Left femoral neck -1.7.   • Vitamin D deficiency   • Therapeutic drug monitoring   • Glaucoma, bilateral   • Menopausal state   • Irritable bladder   • Macrocytosis   • Hypothyroidism   • Acute urinary tract infection   • Abdominal pain, left lower quadrant   • Impaired fasting glucose   • Pancreatic lesion, 4 mm, repeat study due June 2019   • Possible Pelvic congestion syndrome         Past Medical History:   Diagnosis Date   • Benign essential hypertension 2/26/2001 02/26/2001--patient was seen as a new patient. She was ordered being treated for hypertension at that time.   • Carotid artery plaque, 05/30/2014--mild bilateral carotid plaque. 1/8/2002 05/30/2014--vascular screen reveals mild bilateral carotid plaque, negative for AAA, negative for PAD.   06/19/2012--vascular screen revealed bilateral, less than 50% internal carotid artery stenosis, negative for AAA, negative for PAD.   05/10/2010--carotid Doppler revealed 16-49% stenosis in the right and left internal carotid arteries.   01/08/2002--vascular screen revealed mild bilateral carotid artery plaque, negative for AAA, negative for PAD.   • Diverticulosis of colon 10/16/2001    11/03/2006--the entire examined colon was normal except for scattered diverticuli.   10/16/2001--colonoscopy revealed a few left-sided scattered diverticuli.   • Glaucoma, bilateral 6/1/2016 06/01/2016--routine follow-up.  She reports she was diagnosed with glaucoma about a month or so ago treated with Travatan eyedrops.   • Heart palpitations 10/13/2008    Patient has at a long history of heart palpitations.   10/13/2008--24-hour Holter monitor reveals an average heart rate of 69, a minimum heart rate was 55. Maximum heart rate was 98. There were no pauses. Ventricular ectopy was zero. Supraventricular ectopy was 5585, with 15 supraventricular runs.  Supraventricular bigeminy events were 6 and supraventricular trigeminy events were one. There was no atrial fibrillation/flutter. No ST segment changes. Patient's heart palpitations related to frequent PACs which have been controlled with atenolol.   • Hyperlipidemia 2/26/2001 02/26/2001--patient was seen as a new patient. She was already on cholesterol medication at that time.   • Impaired fasting glucose 6/14/2018 06/14/2018--routine follow-up.  Fasting serum glucose mildly elevated at 102.  Recommend low carbohydrate diet and weight loss.  Exercise.   • Irritable bladder 8/29/2017 08/29/2017--patient seen in follow-up after recent urinary tract infection with complaints of nocturia ×4-5 per night as well as frequency and urgency.  Repeat urinalysis is negative.  Urine culture sent.  Given the fact the patient is had the urinary symptoms prior to the onset of the recent UTI, I will treat her empirically for irritable bladder.  Myrbetriq 25 mg per day.  May increase to 50 mg per day if necessary.  I will have her follow-up in about 3 weeks to reassess his situation.   • Irritable bowel syndrome 4/27/2016   • Macrocytosis 12/5/2017 12/05/2017--routine follow-up.  CBC is normal except MCV elevated at 99.5.  Homocystine, methylmalonic acid, serum B-12 and folic acid levels ordered.   • Menopausal state 5/17/2017   • Osteopenia, 06/20/2018--lumbar spine 0.4.  Right femoral neck -1.1.  Left femoral neck -0.8.  05/30/2014--lumbar spine 0.6.  Right femoral neck -1.2.  Left femoral neck -1.7. 6/9/2006 06/20/2018--DEXA scan reveals average lumbar T score 0.4.  Right femoral neck T score -1.1.  Left femoral neck T score -0.8.  Assessment is osteopenia and compared to previous study in 2016 there is been no change in the right femoral neck and 17.2% improvement in the left femoral neck and a 2.1% decrease in the lumbar spine.  05/30/2014--DEXA scan reveals average lumbar spine T score of 0.6. Right femoral neck  T score -1.2. Left femoral neck T score -1.7. Osteopenia. Compared to the previous examination of 11/09/2010, there is a 1% decrease in the left hip bone mineral density, 2.1% decrease in the right hip bone density, and a 1.6% improvement in the lumbar spine bone density.   11/22/2010--treatment for osteopenia begun with Fosamax but patient discontinued it on her own.   11/09/2010--DEXA scan revealed average lumbar spine T score 0.5. Left femoral neck T score -1.4. Right femoral neck T score -1.1. Osteopenia.   06/09/2006--DEXA scan reveals lumbar spine T score 0.6. Left hip T score -0.8, left    • Primary osteoarthritis of left knee 3/23/2004    03/23/2004--patient was evaluated by the orthopedist for left knee pain. X-rays revealed osteoarthritis with some medial joint space narrowing, subchondral sclerosis, and patellofemoral spurs. Cortisone injections were given.   • Vitamin D deficiency 4/27/2016         Past Surgical History:   Procedure Laterality Date   • CATARACT EXTRACTION Right 01/2014 January 2014--cataract extirpation and intraocular lens implantation right eye.    • CATARACT EXTRACTION Left 09/2010 September 2010--cataract extirpation and intraocular lens implantation in the left eye.   • COLONOSCOPY  11/03/2006 11/03/2006--the entire examined colon was normal except for scattered diverticuli.    • COLONOSCOPY  10/16/2001    10/16/2001--colonoscopy revealed a few left-sided scattered diverticuli.         Allergies   Allergen Reactions   • Neomycin-Bacitracin Zn-Polymyx    • Sulfa Antibiotics            Current Outpatient Prescriptions:   •  amLODIPine-benazepril (LOTREL) 5-40 MG per capsule, TAKE 1 CAPSULE DAILY, Disp: 90 capsule, Rfl: 1  •  aspirin 81 MG tablet, Take  by mouth., Disp: , Rfl:   •  atorvastatin (LIPITOR) 40 MG tablet, 1 by mouth daily for cholesterol, Disp: 90 tablet, Rfl: 3  •  Cholecalciferol (VITAMIN D3) 5000 UNITS capsule capsule, 1 by mouth daily as directed, Disp: 30  "capsule, Rfl:   •  ciprofloxacin (CIPRO) 500 MG tablet, Take 1 tablet by mouth Every 12 (Twelve) Hours., Disp: 10 tablet, Rfl: 0  •  Garlic 1000 MG capsule, Take  by mouth., Disp: , Rfl:   •  hydrochlorothiazide (HYDRODIURIL) 25 MG tablet, TAKE 1 TABLET DAILY (NEED LABS AND FOLLOW UP AS SOON AS POSSIBLE), Disp: 90 tablet, Rfl: 1  •  metoprolol succinate XL (TOPROL-XL) 25 MG 24 hr tablet, 1 by mouth daily for blood pressure and heart palpitations., Disp: 90 tablet, Rfl: 3  •  Multiple Vitamin (MULTI VITAMIN DAILY PO), Take  by mouth daily., Disp: , Rfl:   •  TRAVATAN Z 0.004 % solution ophthalmic solution, , Disp: , Rfl:       Family History   Problem Relation Age of Onset   • Cirrhosis Mother         Of Liver. Mother  at age 62 from alcoholic cirrhosis.   • Heart attack Father         Acute Myocardial Infarction.  Father  of a myocardial infarction at age 68.         Social History     Social History   • Marital status:      Spouse name: N/A   • Number of children: N/A   • Years of education: N/A     Occupational History   • Retired () [employer]      Social History Main Topics   • Smoking status: Never Smoker   • Smokeless tobacco: Never Used   • Alcohol use No   • Drug use: No   • Sexual activity: Not Currently     Partners: Male     Other Topics Concern   • Not on file     Social History Narrative   • No narrative on file         Vitals:    18 1416   BP: 134/60   Pulse: 66   SpO2: 98%   Weight: 65.8 kg (145 lb)   Height: 157.5 cm (62.01\")          Physical Exam:    General: Alert and oriented x 3.  No acute distress.  Normal affect.  HEENT: Pupils equal, round, reactive to light; extraocular movements intact; sclerae nonicteric; pharynx, ear canals and TMs normal.  Neck: Without JVD, thyromegaly, bruit, or adenopathy.  Lungs: Clear to auscultation in all fields.  Heart: Regular rate and rhythm without murmur, rub, gallop, or click.  Abdomen: Soft, nontender, without " hepatosplenomegaly or hernia.  Bowel sounds normal.  : Deferred.  Rectal: Deferred.  Extremities: Without clubbing, cyanosis, edema, or pulse deficit.  Neurologic: Intact without focal deficit.  Normal station and gait observed during ingress and egress from the examination room.  Skin: Without significant lesion.  Musculoskeletal: Unremarkable.      Lab/other results:    I reviewed the results of the CT scan of the abdomen and pelvis as well as the DEXA scan.    Assessment/Plan:     Diagnosis Plan   1. Abdominal pain, left lower quadrant     2. Pancreatic lesion, 4 mm, repeat study due June 2019     3. Acute urinary tract infection     4. Possible Pelvic congestion syndrome     5. Benign essential hypertension     6. Hyperlipidemia, unspecified hyperlipidemia type     7. Vitamin D deficiency     8. Impaired fasting glucose     9. Hypothyroidism, unspecified type     10. Macrocytosis     11. Therapeutic drug monitoring     12. Osteopenia of multiple sites         Patient with abdominal pain in the left lower quadrant of uncertain etiology.  She has a very small pancreatic lesion which I do not think has anything to do with her symptoms.  However, this lesion does need to be reevaluated with 1 year.  Patient did have evidence of an acute urinary tract infection and she reports she thinks she is feeling better after starting the Cipro and it could be that this is the problem in its entirety.  However, the CT scan also shows possible pelvic congestion syndrome and I think this should be evaluated by gynecologist.  Her osteopenia actually overall seems to be improving and therefore we will continue the current regimen.  Her blood pressures well controlled.  The other diagnosis in the assessment and plan were added to facilitate ordering future lab and follow-up.    Plan is as follows: Finish Cipro.  Patient will come in Monday for a urinalysis and urine culture.  She will follow-up on the phone for the results.  I  will have her follow-up in about 5 months with lab prior.  GYN referral given.      Procedures

## 2018-06-29 DIAGNOSIS — N39.0 ACUTE URINARY TRACT INFECTION: ICD-10-CM

## 2018-07-04 LAB
BACTERIA UR CULT: NO GROWTH
BACTERIA UR CULT: NORMAL

## 2018-07-05 ENCOUNTER — TELEPHONE (OUTPATIENT)
Dept: OBSTETRICS AND GYNECOLOGY | Age: 83
End: 2018-07-05

## 2018-07-05 NOTE — TELEPHONE ENCOUNTER
New Patient  Insurance- [employer]/ medicare  Ref- Dr. Dietrich    Abdominal pain, left lower quadrant   Pelvic congestion syndrome

## 2018-07-09 RX ORDER — AMLODIPINE BESYLATE AND BENAZEPRIL HYDROCHLORIDE 5; 40 MG/1; MG/1
CAPSULE ORAL
Qty: 90 CAPSULE | Refills: 0 | Status: SHIPPED | OUTPATIENT
Start: 2018-07-09 | End: 2018-10-07 | Stop reason: SDUPTHER

## 2018-07-21 DIAGNOSIS — I10 BENIGN ESSENTIAL HYPERTENSION: Chronic | ICD-10-CM

## 2018-07-23 RX ORDER — HYDROCHLOROTHIAZIDE 25 MG/1
TABLET ORAL
Qty: 90 TABLET | Refills: 0 | Status: SHIPPED | OUTPATIENT
Start: 2018-07-23 | End: 2018-10-21 | Stop reason: SDUPTHER

## 2018-07-27 ENCOUNTER — OFFICE VISIT (OUTPATIENT)
Dept: OBSTETRICS AND GYNECOLOGY | Age: 83
End: 2018-07-27

## 2018-07-27 VITALS
SYSTOLIC BLOOD PRESSURE: 120 MMHG | HEIGHT: 62 IN | DIASTOLIC BLOOD PRESSURE: 64 MMHG | BODY MASS INDEX: 26.91 KG/M2 | WEIGHT: 146.2 LBS

## 2018-07-27 DIAGNOSIS — R10.32 INTERMITTENT LEFT LOWER QUADRANT ABDOMINAL PAIN: Primary | ICD-10-CM

## 2018-07-27 DIAGNOSIS — N94.89 PELVIC CONGESTIVE SYNDROME: ICD-10-CM

## 2018-07-27 DIAGNOSIS — K57.32 DIVERTICULITIS OF LARGE INTESTINE WITHOUT PERFORATION OR ABSCESS WITHOUT BLEEDING: ICD-10-CM

## 2018-07-27 DIAGNOSIS — I86.2 PELVIC VARICES: ICD-10-CM

## 2018-07-27 PROCEDURE — 99203 OFFICE O/P NEW LOW 30 MIN: CPT | Performed by: OBSTETRICS & GYNECOLOGY

## 2018-07-27 NOTE — PROGRESS NOTES
"    2018      Patient:  Stephanie Hawkins   MR#:[id number]    Office note    Chief Complaint   Patient presents with   • Establish Care     New patient.  Pelvic congestion syndrom.  Referred by Dr. Dietrich. C/o Right quad pain.    • Gynecologic Exam     Annual.  Last mammo 17, benign.  Last Dexa 18, Osteopenia.        Subjective     History of Present Illness  85 y.o. female  presents as a referral from Dr. Dietrich for evaluation of right lower quadrant pain.  The patient states that her symptoms are intermittent mostly mild occasionally moderate and have occurred for the last several years.  She has not correlated any particular activity with the pain per se.  Recent CT scan to evaluate the pain suggested prominent vasculature in the region of the broad ligament and possible \"pelvic congestion syndrome .\"      We discussed the diagnosis of pelvic congestion syndrome at some length.  The diagnosis is mostly reserved for young menstruating women in their 30s and 40s.  The disease in younger populations has associated chronic pelvic pain type picture with associated dyspareunia.  The disease entity is relatively new in the spectrum of diagnosis that are made.  It's not well understood nor are there is specific diagnosis criteria.  In young women with the disease entity with debilitating symptoms, hysterectomy is considered.  We discussed that in the setting of an 85-year-old that her hormonal axis is nonexistent with menopause for more than 30 years and that her uterus is not hormonally active.  In that setting I do not believe a diagnosis of pelvic congestion syndrome is appropriate.  It certainly possible that she has some pelvic varicosities and that seems supported by the report.    In reviewing the CT scan is well the patient has known diverticular disease.  I feel much more likely that her pain is from diverticular disease.  We discussed strategies for diminishing and reducing diverticular flares " and avoiding specific foods with small seeds that can aggravate a diverticulum.    Patient Active Problem List   Diagnosis   • Benign essential hypertension   • Carotid artery plaque, 05/30/2017--16-49% left.  Mild plaque right.  05/30/2014--mild bilateral carotid plaque.   • Diverticulosis of colon   • Heart palpitations   • Hyperlipidemia   • Irritable bowel syndrome   • Primary osteoarthritis of left knee   • Osteopenia, 06/20/2018--lumbar spine 0.4.  Right femoral neck -1.1.  Left femoral neck -0.8.  05/30/2014--lumbar spine 0.6.  Right femoral neck -1.2.  Left femoral neck -1.7.   • Vitamin D deficiency   • Therapeutic drug monitoring   • Glaucoma, bilateral   • Menopausal state   • Irritable bladder   • Macrocytosis   • Hypothyroidism   • Acute urinary tract infection   • Abdominal pain, left lower quadrant   • Impaired fasting glucose   • Pancreatic lesion, 4 mm, repeat study due June 2019   • Possible Pelvic congestion syndrome       Past Medical History:   Diagnosis Date   • Benign essential hypertension 2/26/2001 02/26/2001--patient was seen as a new patient. She was ordered being treated for hypertension at that time.   • Carotid artery plaque, 05/30/2014--mild bilateral carotid plaque. 1/8/2002 05/30/2014--vascular screen reveals mild bilateral carotid plaque, negative for AAA, negative for PAD.   06/19/2012--vascular screen revealed bilateral, less than 50% internal carotid artery stenosis, negative for AAA, negative for PAD.   05/10/2010--carotid Doppler revealed 16-49% stenosis in the right and left internal carotid arteries.   01/08/2002--vascular screen revealed mild bilateral carotid artery plaque, negative for AAA, negative for PAD.   • Diverticulosis of colon 10/16/2001    11/03/2006--the entire examined colon was normal except for scattered diverticuli.   10/16/2001--colonoscopy revealed a few left-sided scattered diverticuli.   • Glaucoma, bilateral 6/1/2016 06/01/2016--routine  follow-up.  She reports she was diagnosed with glaucoma about a month or so ago treated with Travatan eyedrops.   • Heart palpitations 10/13/2008    Patient has at a long history of heart palpitations.   10/13/2008--24-hour Holter monitor reveals an average heart rate of 69, a minimum heart rate was 55. Maximum heart rate was 98. There were no pauses. Ventricular ectopy was zero. Supraventricular ectopy was 5585, with 15 supraventricular runs. Supraventricular bigeminy events were 6 and supraventricular trigeminy events were one. There was no atrial fibrillation/flutter. No ST segment changes. Patient's heart palpitations related to frequent PACs which have been controlled with atenolol.   • Hyperlipidemia 2/26/2001 02/26/2001--patient was seen as a new patient. She was already on cholesterol medication at that time.   • Impaired fasting glucose 6/14/2018 06/14/2018--routine follow-up.  Fasting serum glucose mildly elevated at 102.  Recommend low carbohydrate diet and weight loss.  Exercise.   • Irritable bladder 8/29/2017 08/29/2017--patient seen in follow-up after recent urinary tract infection with complaints of nocturia ×4-5 per night as well as frequency and urgency.  Repeat urinalysis is negative.  Urine culture sent.  Given the fact the patient is had the urinary symptoms prior to the onset of the recent UTI, I will treat her empirically for irritable bladder.  Myrbetriq 25 mg per day.  May increase to 50 mg per day if necessary.  I will have her follow-up in about 3 weeks to reassess his situation.   • Irritable bowel syndrome 4/27/2016   • Macrocytosis 12/5/2017 12/05/2017--routine follow-up.  CBC is normal except MCV elevated at 99.5.  Homocystine, methylmalonic acid, serum B-12 and folic acid levels ordered.   • Menopausal state 5/17/2017   • Osteopenia, 06/20/2018--lumbar spine 0.4.  Right femoral neck -1.1.  Left femoral neck -0.8.  05/30/2014--lumbar spine 0.6.  Right femoral neck -1.2.   Left femoral neck -1.7. 2018--DEXA scan reveals average lumbar T score 0.4.  Right femoral neck T score -1.1.  Left femoral neck T score -0.8.  Assessment is osteopenia and compared to previous study in 2016 there is been no change in the right femoral neck and 17.2% improvement in the left femoral neck and a 2.1% decrease in the lumbar spine.  2014--DEXA scan reveals average lumbar spine T score of 0.6. Right femoral neck T score -1.2. Left femoral neck T score -1.7. Osteopenia. Compared to the previous examination of 2010, there is a 1% decrease in the left hip bone mineral density, 2.1% decrease in the right hip bone density, and a 1.6% improvement in the lumbar spine bone density.   2010--treatment for osteopenia begun with Fosamax but patient discontinued it on her own.   2010--DEXA scan revealed average lumbar spine T score 0.5. Left femoral neck T score -1.4. Right femoral neck T score -1.1. Osteopenia.   2006--DEXA scan reveals lumbar spine T score 0.6. Left hip T score -0.8, left    • Primary osteoarthritis of left knee 3/23/2004    2004--patient was evaluated by the orthopedist for left knee pain. X-rays revealed osteoarthritis with some medial joint space narrowing, subchondral sclerosis, and patellofemoral spurs. Cortisone injections were given.   • Vitamin D deficiency 2016       Past Surgical History:   Procedure Laterality Date   • CATARACT EXTRACTION Right 2014--cataract extirpation and intraocular lens implantation right eye.    • CATARACT EXTRACTION Left 2010--cataract extirpation and intraocular lens implantation in the left eye.   • COLONOSCOPY  2006--the entire examined colon was normal except for scattered diverticuli.    • COLONOSCOPY  10/16/2001    10/16/2001--colonoscopy revealed a few left-sided scattered diverticuli.       Obstetric History:  OB History      Para  Term  AB Living    5 5 3 2   3    SAB TAB Ectopic Molar Multiple Live Births              3         Menstrual History:     No LMP recorded (lmp unknown). Patient is postmenopausal.       #: 1, Date: None, Sex: None, Weight: None, GA: None, Delivery: None, Apgar1: None, Apgar5: None, Living: Fetal Demise, Birth Comments: None    #: 2, Date: None, Sex: None, Weight: None, GA: None, Delivery: None, Apgar1: None, Apgar5: None, Living: Fetal Demise, Birth Comments: None    #: 3, Date: , Sex: None, Weight: None, GA: None, Delivery: Vaginal, Spontaneous Delivery, Apgar1: None, Apgar5: None, Living: Living, Birth Comments: None    #: 4, Date: , Sex: None, Weight: None, GA: None, Delivery: Vaginal, Spontaneous Delivery, Apgar1: None, Apgar5: None, Living: Living, Birth Comments: None    #: 5, Date: , Sex: None, Weight: None, GA: None, Delivery: Vaginal, Spontaneous Delivery, Apgar1: None, Apgar5: None, Living: Living, Birth Comments: None      Family History   Problem Relation Age of Onset   • Cirrhosis Mother         Of Liver. Mother  at age 62 from alcoholic cirrhosis.   • Heart attack Father         Acute Myocardial Infarction.  Father  of a myocardial infarction at age 68.       Social History   Substance Use Topics   • Smoking status: Never Smoker   • Smokeless tobacco: Never Used   • Alcohol use No       Neomycin-bacitracin zn-polymyx and Sulfa antibiotics      Current Outpatient Prescriptions:   •  amLODIPine-benazepril (LOTREL) 5-40 MG per capsule, TAKE 1 CAPSULE DAILY, Disp: 90 capsule, Rfl: 0  •  aspirin 81 MG tablet, Take  by mouth., Disp: , Rfl:   •  atorvastatin (LIPITOR) 40 MG tablet, 1 by mouth daily for cholesterol, Disp: 90 tablet, Rfl: 3  •  Cholecalciferol (VITAMIN D3) 5000 UNITS capsule capsule, 1 by mouth daily as directed, Disp: 30 capsule, Rfl:   •  Garlic 1000 MG capsule, Take  by mouth., Disp: , Rfl:   •  hydrochlorothiazide (HYDRODIURIL) 25 MG tablet, TAKE 1 TABLET DAILY  "(NEED LABS AND FOLLOW UP AS SOON AS POSSIBLE), Disp: 90 tablet, Rfl: 0  •  Multiple Vitamin (MULTI VITAMIN DAILY PO), Take  by mouth daily., Disp: , Rfl:   •  Multiple Vitamins-Minerals (PRESERVISION AREDS PO), Take  by mouth Every Night., Disp: , Rfl:   •  TRAVATAN Z 0.004 % solution ophthalmic solution, 1 drop Every Night., Disp: , Rfl:   •  ciprofloxacin (CIPRO) 500 MG tablet, Take 1 tablet by mouth Every 12 (Twelve) Hours., Disp: 10 tablet, Rfl: 0  •  metoprolol succinate XL (TOPROL-XL) 25 MG 24 hr tablet, 1 by mouth daily for blood pressure and heart palpitations., Disp: 90 tablet, Rfl: 3    The following portions of the patient's history were reviewed and updated as appropriate: allergies, current medications, past family history, past medical history, past social history, past surgical history and problem list.    Review of Systems   Constitutional: Negative.    Respiratory: Negative.    Cardiovascular: Negative.    Gastrointestinal: Positive for abdominal pain and constipation. Negative for vomiting.   Genitourinary: Negative.    Psychiatric/Behavioral: Negative.        BP Readings from Last 3 Encounters:   07/27/18 120/64   06/27/18 134/60   06/14/18 127/84      Wt Readings from Last 3 Encounters:   07/27/18 66.3 kg (146 lb 3.2 oz)   06/27/18 65.8 kg (145 lb)   06/14/18 66.4 kg (146 lb 6.4 oz)      BMI: Estimated body mass index is 26.74 kg/m² as calculated from the following:    Height as of this encounter: 157.5 cm (62\").    Weight as of this encounter: 66.3 kg (146 lb 3.2 oz).  BSA: Estimated body surface area is 1.67 meters squared as calculated from the following:    Height as of this encounter: 157.5 cm (62\").    Weight as of this encounter: 66.3 kg (146 lb 3.2 oz).    Objective   Physical Exam   Constitutional: She is oriented to person, place, and time. She appears well-developed and well-nourished.   HENT:   Head: Normocephalic and atraumatic.   Cardiovascular: Normal rate and regular rhythm.  "   Pulmonary/Chest: Effort normal and breath sounds normal.   Abdominal: Soft. Bowel sounds are normal. She exhibits no distension and no mass. There is no tenderness.   Genitourinary: Vagina normal and uterus normal.   Neurological: She is alert and oriented to person, place, and time.   Psychiatric: She has a normal mood and affect. Her behavior is normal. Judgment and thought content normal.   Nursing note and vitals reviewed.        Assessment/Plan     Stephanie was seen today for establish care and gynecologic exam.    Diagnoses and all orders for this visit:    Intermittent left lower quadrant abdominal pain (new)    Diverticulitis of large intestine without perforation or abscess without bleeding (new)    Pelvic varices  - Unlikely the source of the patient's symptoms    Pelvic congestive syndrome  - It would be my opinion that the diagnosis is inappropriate    I spent 25 minutes with the patient her more than 90% of that time face-to-face discussing her CT scan and diagnosis.  I do not feel the diagnosis of pelvic congestion syndrome is appropriate in a menopausal female.  I do not think the varicosities are likely the source of her abdominal pain.  The patient has known diverticular disease in the symptoms sound most consistent with that disease entity.  We discussed at some length the disease entity and causes.  As well we discussed dietary changes and ingestion of fiber to avoid diverticular exacerbations      Return if symptoms worsen or fail to improve.        Damon Spann MD   7/27/2018 2:26 PM

## 2018-09-05 ENCOUNTER — TELEPHONE (OUTPATIENT)
Dept: INTERNAL MEDICINE | Facility: CLINIC | Age: 83
End: 2018-09-05

## 2018-09-05 DIAGNOSIS — N39.0 ACUTE URINARY TRACT INFECTION: Primary | ICD-10-CM

## 2018-09-05 NOTE — TELEPHONE ENCOUNTER
Pt's  is coming in for labs tomorrow and she would like to come in at the same time to have a UA and culture done. She thinks she has another UTI. Is this ok? Call [phone].

## 2018-09-06 LAB
APPEARANCE UR: CLEAR
BACTERIA #/AREA URNS HPF: NORMAL /HPF
BILIRUB UR QL STRIP: NEGATIVE
CASTS URNS MICRO: NORMAL
COLOR UR: YELLOW
EPI CELLS #/AREA URNS HPF: NORMAL /HPF
GLUCOSE UR QL: NEGATIVE
HGB UR QL STRIP: NEGATIVE
KETONES UR QL STRIP: NEGATIVE
LEUKOCYTE ESTERASE UR QL STRIP: ABNORMAL
NITRITE UR QL STRIP: NEGATIVE
PH UR STRIP: 7.5 [PH] (ref 5–8)
PROT UR QL STRIP: NEGATIVE
RBC #/AREA URNS HPF: NORMAL /HPF
SP GR UR: 1.01 (ref 1–1.03)
UROBILINOGEN UR STRIP-MCNC: ABNORMAL MG/DL
WBC #/AREA URNS HPF: NORMAL /HPF

## 2018-09-08 LAB
BACTERIA UR CULT: NORMAL
BACTERIA UR CULT: NORMAL

## 2018-09-13 ENCOUNTER — CLINICAL SUPPORT (OUTPATIENT)
Dept: INTERNAL MEDICINE | Facility: CLINIC | Age: 83
End: 2018-09-13

## 2018-09-13 DIAGNOSIS — Z23 NEED FOR INFLUENZA VACCINATION: Primary | ICD-10-CM

## 2018-09-13 PROCEDURE — 90471 IMMUNIZATION ADMIN: CPT | Performed by: INTERNAL MEDICINE

## 2018-09-13 PROCEDURE — 90686 IIV4 VACC NO PRSV 0.5 ML IM: CPT | Performed by: INTERNAL MEDICINE

## 2018-10-08 RX ORDER — AMLODIPINE BESYLATE AND BENAZEPRIL HYDROCHLORIDE 5; 40 MG/1; MG/1
CAPSULE ORAL
Qty: 90 CAPSULE | Refills: 1 | Status: SHIPPED | OUTPATIENT
Start: 2018-10-08 | End: 2019-04-06 | Stop reason: SDUPTHER

## 2018-10-21 DIAGNOSIS — I10 BENIGN ESSENTIAL HYPERTENSION: Chronic | ICD-10-CM

## 2018-10-22 RX ORDER — HYDROCHLOROTHIAZIDE 25 MG/1
TABLET ORAL
Qty: 90 TABLET | Refills: 0 | Status: SHIPPED | OUTPATIENT
Start: 2018-10-22 | End: 2019-01-19 | Stop reason: SDUPTHER

## 2018-11-19 ENCOUNTER — TRANSCRIBE ORDERS (OUTPATIENT)
Dept: ADMINISTRATIVE | Facility: HOSPITAL | Age: 83
End: 2018-11-19

## 2018-11-19 DIAGNOSIS — I10 BENIGN ESSENTIAL HYPERTENSION: Chronic | ICD-10-CM

## 2018-11-19 DIAGNOSIS — E03.9 HYPOTHYROIDISM, UNSPECIFIED TYPE: Chronic | ICD-10-CM

## 2018-11-19 DIAGNOSIS — Z51.81 THERAPEUTIC DRUG MONITORING: ICD-10-CM

## 2018-11-19 DIAGNOSIS — R73.01 IMPAIRED FASTING GLUCOSE: Chronic | ICD-10-CM

## 2018-11-19 DIAGNOSIS — Z12.31 SCREENING MAMMOGRAM, ENCOUNTER FOR: Primary | ICD-10-CM

## 2018-11-19 DIAGNOSIS — E78.5 HYPERLIPIDEMIA, UNSPECIFIED HYPERLIPIDEMIA TYPE: Chronic | ICD-10-CM

## 2018-11-19 DIAGNOSIS — E55.9 VITAMIN D DEFICIENCY: Chronic | ICD-10-CM

## 2018-11-21 LAB
25(OH)D3+25(OH)D2 SERPL-MCNC: 36 NG/ML (ref 30–100)
ALBUMIN SERPL-MCNC: 4.1 G/DL (ref 3.5–5.2)
ALBUMIN/GLOB SERPL: 1.4 G/DL
ALP SERPL-CCNC: 88 U/L (ref 39–117)
ALT SERPL-CCNC: 29 U/L (ref 1–33)
AST SERPL-CCNC: 26 U/L (ref 1–32)
BILIRUB SERPL-MCNC: 0.5 MG/DL (ref 0.1–1.2)
BUN SERPL-MCNC: 22 MG/DL (ref 8–23)
BUN/CREAT SERPL: 28.2 (ref 7–25)
CALCIUM SERPL-MCNC: 9.5 MG/DL (ref 8.6–10.5)
CHLORIDE SERPL-SCNC: 97 MMOL/L (ref 98–107)
CHOLEST SERPL-MCNC: 150 MG/DL (ref 100–199)
CK SERPL-CCNC: 64 U/L (ref 20–180)
CO2 SERPL-SCNC: 30.4 MMOL/L (ref 22–29)
CREAT SERPL-MCNC: 0.78 MG/DL (ref 0.57–1)
ERYTHROCYTE [DISTWIDTH] IN BLOOD BY AUTOMATED COUNT: 13.3 % (ref 11.7–13)
GLOBULIN SER CALC-MCNC: 2.9 GM/DL
GLUCOSE SERPL-MCNC: 108 MG/DL (ref 65–99)
HBA1C MFR BLD: 5.9 % (ref 4.8–5.6)
HCT VFR BLD AUTO: 38.7 % (ref 35.6–45.5)
HDL SERPL-SCNC: 41.2 UMOL/L
HDLC SERPL-MCNC: 58 MG/DL
HGB BLD-MCNC: 12.9 G/DL (ref 11.9–15.5)
LDL SERPL QN: 21.3 NM
LDL SERPL-SCNC: 770 NMOL/L
LDL SMALL SERPL-SCNC: 337 NMOL/L
LDLC SERPL CALC-MCNC: 72 MG/DL (ref 0–99)
MCH RBC QN AUTO: 31.3 PG (ref 26.9–32)
MCHC RBC AUTO-ENTMCNC: 33.3 G/DL (ref 32.4–36.3)
MCV RBC AUTO: 93.9 FL (ref 80.5–98.2)
PLATELET # BLD AUTO: 307 10*3/MM3 (ref 140–500)
POTASSIUM SERPL-SCNC: 4.3 MMOL/L (ref 3.5–5.2)
PROT SERPL-MCNC: 7 G/DL (ref 6–8.5)
RBC # BLD AUTO: 4.12 10*6/MM3 (ref 3.9–5.2)
SODIUM SERPL-SCNC: 138 MMOL/L (ref 136–145)
T3FREE SERPL-MCNC: 3.2 PG/ML (ref 2–4.4)
T4 FREE SERPL-MCNC: 1.16 NG/DL (ref 0.93–1.7)
TRIGL SERPL-MCNC: 98 MG/DL (ref 0–149)
TSH SERPL DL<=0.005 MIU/L-ACNC: 4.11 MIU/ML (ref 0.27–4.2)
WBC # BLD AUTO: 6.85 10*3/MM3 (ref 4.5–10.7)

## 2018-11-27 ENCOUNTER — OFFICE VISIT (OUTPATIENT)
Dept: INTERNAL MEDICINE | Facility: CLINIC | Age: 83
End: 2018-11-27

## 2018-11-27 VITALS
DIASTOLIC BLOOD PRESSURE: 52 MMHG | HEIGHT: 62 IN | WEIGHT: 152 LBS | HEART RATE: 68 BPM | BODY MASS INDEX: 27.97 KG/M2 | OXYGEN SATURATION: 97 % | SYSTOLIC BLOOD PRESSURE: 120 MMHG

## 2018-11-27 DIAGNOSIS — M85.89 OSTEOPENIA OF MULTIPLE SITES: Chronic | ICD-10-CM

## 2018-11-27 DIAGNOSIS — N94.89 PELVIC CONGESTION SYNDROME: Chronic | ICD-10-CM

## 2018-11-27 DIAGNOSIS — R73.01 IMPAIRED FASTING GLUCOSE: Primary | Chronic | ICD-10-CM

## 2018-11-27 DIAGNOSIS — E78.5 HYPERLIPIDEMIA, UNSPECIFIED HYPERLIPIDEMIA TYPE: Chronic | ICD-10-CM

## 2018-11-27 DIAGNOSIS — Z51.81 THERAPEUTIC DRUG MONITORING: ICD-10-CM

## 2018-11-27 DIAGNOSIS — K86.9 PANCREATIC LESION: Chronic | ICD-10-CM

## 2018-11-27 DIAGNOSIS — N32.89 IRRITABLE BLADDER: Chronic | ICD-10-CM

## 2018-11-27 DIAGNOSIS — I65.23 ATHEROSCLEROSIS OF BOTH CAROTID ARTERIES: Chronic | ICD-10-CM

## 2018-11-27 DIAGNOSIS — E03.9 HYPOTHYROIDISM, UNSPECIFIED TYPE: Chronic | ICD-10-CM

## 2018-11-27 DIAGNOSIS — I10 BENIGN ESSENTIAL HYPERTENSION: Chronic | ICD-10-CM

## 2018-11-27 PROBLEM — R10.32 ABDOMINAL PAIN, LEFT LOWER QUADRANT: Status: RESOLVED | Noted: 2018-06-14 | Resolved: 2018-11-27

## 2018-11-27 PROBLEM — N39.0 ACUTE URINARY TRACT INFECTION: Status: RESOLVED | Noted: 2018-06-14 | Resolved: 2018-11-27

## 2018-11-27 PROBLEM — Z23 NEED FOR INFLUENZA VACCINATION: Status: RESOLVED | Noted: 2018-09-13 | Resolved: 2018-11-27

## 2018-11-27 PROCEDURE — 99214 OFFICE O/P EST MOD 30 MIN: CPT | Performed by: INTERNAL MEDICINE

## 2018-11-27 NOTE — PROGRESS NOTES
"             11/27/2018    Patient Information  Stephanie Hawkins                                                                                          [id number]  [place] [address]      12/16/1932  [unfilled]  There is no work phone number on file.    Chief Complaint:     Follow-up impaired fasting glucose, hyperlipidemia, hypertension, carotid artery plaque, osteopenia, hypothyroidism, 4 mm pancreatic lesion, possible pelvic congestion syndrome, irritable bladder.  Complaining of urinary frequency and urgency.    History of Present Illness:    Patient with a history of medical problems as outlined in chief complaint of been fairly stable over the past year except for a problem with intermittent left lower quadrant abdominal pain and CT scan findings indicating possible pelvic congestion syndrome.  The history regarding this will be described below.  Patient currently has no pain but is describing urinary frequency, urgency, without dysuria also noted below.  Past medical history reviewed and updated where necessary including health maintenance parameters.  This reveals she is up-to-date or else accounted for.    The history regarding urinary frequency, urgency, and history of left lower quadrant abdominal pain and possible pelvic congestion syndrome:    11/27/2018--patient seen in follow-up and reports she is having urinary frequency without dysuria, but no incontinence and occasionally has some urgency.  She has no pain at the present time.  Apparently she saw a gynecologist and he did not examine her because \"we do not do Pap smears on a woman year age\".  The gynecologist also felt that pelvic congestion syndrome was not the correct diagnosis and that her left lower quadrant abdominal pain is likely related to diverticular disease.  I explained to her that that certainly a possibility although she had no evidence of acute diverticulitis.  Patient is currently pain free.  The only symptoms present are the " urgency and frequency without dysuria.  She apparently responded favorably to Myrbetriq 25 mg per day in the past and I think it would be reasonable to try this if her insurance will cover the drug at a reasonable cost.    06/27/2018--patient seen in follow-up and the results of the CT scan discussed.  She currently is not having any abdominal pain but reports when it is present it is not severe and it is something that she just feels that something is not right in the left lower quadrant.  I'm not sure what the prevalence of pelvic congestion syndrome is, particularly in an 85-year-old patient, but I think that it would be reasonable for her to be assessed by gynecologist.  In regards to the pancreatic lesion, it is an incidental finding and I don't think is related to her symptoms, but certainly needs to be reevaluated with a repeat CT scan or MRI within one year.  Patient is aware.  Also patient had ,000 colony-forming units of bacteria in her urinalysis/urine culture and we are treating her with Cipro and she feels that this may have made a difference and she feels better.  I will have her complete the course of Cipro and then we will repeat a urinalysis and urine culture.    06/20/2018--CT scan of the abdomen and pelvis with contrast reveals no acute abnormality.  Colonic diverticulosis without diverticulitis is present.  Prominent collateral vessels seen in the region of the bilateral broad ligaments, left greater than right.  Please correlate clinically for pelvic congestion syndrome.  A 4 mm hypodense lesion within the pancreas may represent a small pseudocyst, ductal ectasia or a small branch intraductal papillary mucinous neoplasm.  Follow-up recommended for CT scan or MRI in one year to reevaluate.    06/14/2018--patient presents with intermittent left lower quadrant abdominal pain past 6 months.  Described as an ache and not associated with change in bowel habits, nausea, vomiting, fever, chills,  rectal bleeding, or any systemic signs or symptoms.  Exam reveals some tenderness to deep palpation in the left lower quadrant.  Given this and the duration of the symptoms, we will proceed with CT scan to rule out pathology.    Review of Systems   Constitution: Negative.   HENT: Negative.    Eyes: Negative.    Cardiovascular: Negative.    Respiratory: Negative.    Endocrine: Negative.    Hematologic/Lymphatic: Negative.    Skin: Negative.    Musculoskeletal: Negative.    Gastrointestinal: Negative.    Genitourinary: Positive for frequency and urgency. Negative for dysuria.   Neurological: Negative.    Psychiatric/Behavioral: Negative.    Allergic/Immunologic: Negative.        Active Problems:    Patient Active Problem List   Diagnosis   • Benign essential hypertension   • Carotid artery plaque, 05/30/2017--16-49% left.  Mild plaque right.  05/30/2014--mild bilateral carotid plaque.   • Diverticulosis of colon   • Heart palpitations   • Hyperlipidemia   • Irritable bowel syndrome   • Primary osteoarthritis of left knee   • Osteopenia, 06/20/2018--lumbar spine 0.4.  Right femoral neck -1.1.  Left femoral neck -0.8.  05/30/2014--lumbar spine 0.6.  Right femoral neck -1.2.  Left femoral neck -1.7.   • Vitamin D deficiency   • Therapeutic drug monitoring   • Glaucoma, bilateral   • Menopausal state   • Irritable bladder   • Macrocytosis   • Hypothyroidism   • Impaired fasting glucose   • Pancreatic lesion, 4 mm, repeat study due June 2019         Past Medical History:   Diagnosis Date   • Benign essential hypertension 2/26/2001 02/26/2001--patient was seen as a new patient. She was ordered being treated for hypertension at that time.   • Carotid artery plaque, 05/30/2014--mild bilateral carotid plaque. 1/8/2002 05/30/2014--vascular screen reveals mild bilateral carotid plaque, negative for AAA, negative for PAD.   06/19/2012--vascular screen revealed bilateral, less than 50% internal carotid artery stenosis,  negative for AAA, negative for PAD.   05/10/2010--carotid Doppler revealed 16-49% stenosis in the right and left internal carotid arteries.   01/08/2002--vascular screen revealed mild bilateral carotid artery plaque, negative for AAA, negative for PAD.   • Diverticulosis of colon 10/16/2001    11/03/2006--the entire examined colon was normal except for scattered diverticuli.   10/16/2001--colonoscopy revealed a few left-sided scattered diverticuli.   • Glaucoma, bilateral 6/1/2016 06/01/2016--routine follow-up.  She reports she was diagnosed with glaucoma about a month or so ago treated with Travatan eyedrops.   • Heart palpitations 10/13/2008    Patient has at a long history of heart palpitations.   10/13/2008--24-hour Holter monitor reveals an average heart rate of 69, a minimum heart rate was 55. Maximum heart rate was 98. There were no pauses. Ventricular ectopy was zero. Supraventricular ectopy was 5585, with 15 supraventricular runs. Supraventricular bigeminy events were 6 and supraventricular trigeminy events were one. There was no atrial fibrillation/flutter. No ST segment changes. Patient's heart palpitations related to frequent PACs which have been controlled with atenolol.   • Hyperlipidemia 2/26/2001 02/26/2001--patient was seen as a new patient. She was already on cholesterol medication at that time.   • Impaired fasting glucose 6/14/2018 06/14/2018--routine follow-up.  Fasting serum glucose mildly elevated at 102.  Recommend low carbohydrate diet and weight loss.  Exercise.   • Irritable bladder 8/29/2017 08/29/2017--patient seen in follow-up after recent urinary tract infection with complaints of nocturia ×4-5 per night as well as frequency and urgency.  Repeat urinalysis is negative.  Urine culture sent.  Given the fact the patient is had the urinary symptoms prior to the onset of the recent UTI, I will treat her empirically for irritable bladder.  Myrbetriq 25 mg per day.  May increase  "to 50 mg per day if necessary.  I will have her follow-up in about 3 weeks to reassess his situation.   • Irritable bowel syndrome 4/27/2016   • Macrocytosis 12/5/2017 12/05/2017--routine follow-up.  CBC is normal except MCV elevated at 99.5.  Homocystine, methylmalonic acid, serum B-12 and folic acid levels ordered.   • Menopausal state 5/17/2017   • Osteopenia, 06/20/2018--lumbar spine 0.4.  Right femoral neck -1.1.  Left femoral neck -0.8.  05/30/2014--lumbar spine 0.6.  Right femoral neck -1.2.  Left femoral neck -1.7. 6/9/2006 06/20/2018--DEXA scan reveals average lumbar T score 0.4.  Right femoral neck T score -1.1.  Left femoral neck T score -0.8.  Assessment is osteopenia and compared to previous study in 2016 there is been no change in the right femoral neck and 17.2% improvement in the left femoral neck and a 2.1% decrease in the lumbar spine.  05/30/2014--DEXA scan reveals average lumbar spine T score of 0.6. Right femoral neck T score -1.2. Left femoral neck T score -1.7. Osteopenia. Compared to the previous examination of 11/09/2010, there is a 1% decrease in the left hip bone mineral density, 2.1% decrease in the right hip bone density, and a 1.6% improvement in the lumbar spine bone density.   11/22/2010--treatment for osteopenia begun with Fosamax but patient discontinued it on her own.   11/09/2010--DEXA scan revealed average lumbar spine T score 0.5. Left femoral neck T score -1.4. Right femoral neck T score -1.1. Osteopenia.   06/09/2006--DEXA scan reveals lumbar spine T score 0.6. Left hip T score -0.8, left    • Pancreatic lesion, 4 mm, repeat study due June 2019 6/27/2018 11/27/2018--patient seen in follow-up and reports she is having urinary frequency without dysuria, but no incontinence and occasionally has some urgency.  She has no pain at the present time.  Apparently she saw a gynecologist and he did not examine her because \"we do not do Pap smears on a woman year age\".  " "06/27/2018--patient seen in follow-up and the results of the CT scan discussed.  She lu   • Possible Pelvic congestion syndrome 6/27/2018 11/27/2018--patient seen in follow-up and reports she is having urinary frequency without dysuria, but no incontinence and occasionally has some urgency.  She has no pain at the present time.  Apparently she saw a gynecologist and he did not examine her because \"we do not do Pap smears on a woman year age\".  06/27/2018--patient seen in follow-up and the results of the CT scan discussed.  She lu   • Primary osteoarthritis of left knee 3/23/2004    03/23/2004--patient was evaluated by the orthopedist for left knee pain. X-rays revealed osteoarthritis with some medial joint space narrowing, subchondral sclerosis, and patellofemoral spurs. Cortisone injections were given.   • Vitamin D deficiency 4/27/2016         Past Surgical History:   Procedure Laterality Date   • CATARACT EXTRACTION Right 01/2014 January 2014--cataract extirpation and intraocular lens implantation right eye.    • CATARACT EXTRACTION Left 09/2010 September 2010--cataract extirpation and intraocular lens implantation in the left eye.   • COLONOSCOPY  11/03/2006 11/03/2006--the entire examined colon was normal except for scattered diverticuli.    • COLONOSCOPY  10/16/2001    10/16/2001--colonoscopy revealed a few left-sided scattered diverticuli.         Allergies   Allergen Reactions   • Neomycin-Bacitracin Zn-Polymyx Swelling     Neosporin   • Sulfa Antibiotics Hives           Current Outpatient Medications:   •  amLODIPine-benazepril (LOTREL) 5-40 MG per capsule, TAKE 1 CAPSULE DAILY, Disp: 90 capsule, Rfl: 1  •  aspirin 81 MG tablet, Take  by mouth., Disp: , Rfl:   •  atorvastatin (LIPITOR) 40 MG tablet, 1 by mouth daily for cholesterol, Disp: 90 tablet, Rfl: 3  •  Cholecalciferol (VITAMIN D3) 5000 UNITS capsule capsule, 1 by mouth daily as directed, Disp: 30 capsule, Rfl:   •  Garlic 1000 MG " capsule, Take  by mouth., Disp: , Rfl:   •  hydrochlorothiazide (HYDRODIURIL) 25 MG tablet, TAKE 1 TABLET DAILY (NEED LABS AND FOLLOW UP AS SOON AS POSSIBLE), Disp: 90 tablet, Rfl: 0  •  metoprolol succinate XL (TOPROL-XL) 25 MG 24 hr tablet, 1 by mouth daily for blood pressure and heart palpitations., Disp: 90 tablet, Rfl: 3  •  Multiple Vitamin (MULTI VITAMIN DAILY PO), Take  by mouth daily., Disp: , Rfl:   •  Multiple Vitamins-Minerals (PRESERVISION AREDS PO), Take  by mouth Every Night., Disp: , Rfl:   •  TRAVATAN Z 0.004 % solution ophthalmic solution, 1 drop Every Night., Disp: , Rfl:   •  Mirabegron ER (MYRBETRIQ) 50 MG tablet sustained-release 24 hour 24 hr tablet, Take 1 capsule daily for irritable bladder, Disp: 30 tablet, Rfl: 3      Family History   Problem Relation Age of Onset   • Cirrhosis Mother         Of Liver. Mother  at age 62 from alcoholic cirrhosis.   • Heart attack Father         Acute Myocardial Infarction.  Father  of a myocardial infarction at age 68.         Social History     Socioeconomic History   • Marital status:      Spouse name: Az   • Number of children: 3   • Years of education: Not on file   • Highest education level: Bachelor's degree (e.g., BA, AB, BS)   Social Needs   • Financial resource strain: Not hard at all   • Food insecurity - worry: Never true   • Food insecurity - inability: Never true   • Transportation needs - medical: No   • Transportation needs - non-medical: No   Occupational History   • Occupation: Retired () [employer]   Tobacco Use   • Smoking status: Never Smoker   • Smokeless tobacco: Never Used   Substance and Sexual Activity   • Alcohol use: No   • Drug use: No   • Sexual activity: Not Currently     Partners: Male   Other Topics Concern   • Not on file   Social History Narrative   • Not on file         Vitals:    18 1442   BP: 120/52   Pulse: 68   SpO2: 97%   Weight: 68.9 kg (152 lb)   Height: 157.5 cm  "(62.01\")          Physical Exam:    General: Alert and oriented x 3.  No acute distress.  Normal affect.  HEENT: Pupils equal, round, reactive to light; extraocular movements intact; sclerae nonicteric; pharynx, ear canals and TMs normal.  Neck: Without JVD, thyromegaly, bruit, or adenopathy.  Lungs: Clear to auscultation in all fields.  Heart: Regular rate and rhythm without murmur, rub, gallop, or click.  Abdomen: Soft, nontender, without hepatosplenomegaly or hernia.  Bowel sounds normal.  : Deferred.  Rectal: Deferred.  Extremities: Without clubbing, cyanosis, edema, or pulse deficit.  Neurologic: Intact without focal deficit.  Normal station and gait observed during ingress and egress from the examination room.  Skin: Without significant lesion.  Musculoskeletal: Unremarkable.    Lab/other results:    I reviewed the consultation from the gynecologist.    NMR is absolutely perfect.  CMP normal except blood sugar slightly elevated at 108.  CBC normal.  Hemoglobin A1c slightly elevated at 5.9.  Thyroid function tests normal.  Vitamin D normal.  CPK normal.    Assessment/Plan:     Diagnosis Plan   1. Impaired fasting glucose     2. Hyperlipidemia, unspecified hyperlipidemia type     3. Benign essential hypertension     4. Carotid artery plaque, 05/30/2017--16-49% left.  Mild plaque right.  05/30/2014--mild bilateral carotid plaque.     5. Osteopenia of multiple sites     6. Hypothyroidism, unspecified type     7. Pancreatic lesion, 4 mm, repeat study due June 2019     8. Possible Pelvic congestion syndrome     9. Irritable bladder  Mirabegron ER (MYRBETRIQ) 50 MG tablet sustained-release 24 hour 24 hr tablet   10. Therapeutic drug monitoring       Patient has very mild impaired fasting glucose at does not require medication.  Hyperlipidemia is under perfect control.  Her blood pressures well controlled.  Patient has carotid artery plaque and had a carotid Doppler study less than 2 years ago.  She has osteopenia " and had a very recent DEXA scan.  Thyroid therapeutic.  There is a small pancreatic lesion but does not need a repeat CT scan until June of next year.  The gynecologist feels the diagnosis of pelvic congestion syndrome is incorrect.  He does feel that her symptoms were related to diverticular disease although patient had no evidence of diverticulitis.  She presents with urinary urgency and frequency without dysuria consistent with irritable bladder.  In the past patient has responded favorably to Myrbetriq and we will give that a try.    Plan is as follows: Start Myrbetriq 50 mg per day.  I will have patient follow-up in about 4 weeks to reassess.  We will go ahead and schedule lab in follow-up after 06/14/2019 and this will also be subsequent Medicare wellness visit.  .      Procedures

## 2018-12-10 DIAGNOSIS — R00.2 HEART PALPITATIONS: Chronic | ICD-10-CM

## 2018-12-10 DIAGNOSIS — I10 BENIGN ESSENTIAL HYPERTENSION: Chronic | ICD-10-CM

## 2018-12-10 RX ORDER — METOPROLOL SUCCINATE 25 MG/1
TABLET, EXTENDED RELEASE ORAL
Qty: 90 TABLET | Refills: 1 | Status: SHIPPED | OUTPATIENT
Start: 2018-12-10 | End: 2019-06-08 | Stop reason: SDUPTHER

## 2018-12-28 ENCOUNTER — HOSPITAL ENCOUNTER (OUTPATIENT)
Dept: MAMMOGRAPHY | Facility: HOSPITAL | Age: 83
Discharge: HOME | End: 2018-12-28
Admitting: INTERNAL MEDICINE

## 2018-12-28 DIAGNOSIS — Z12.31 SCREENING MAMMOGRAM, ENCOUNTER FOR: ICD-10-CM

## 2018-12-28 PROCEDURE — 77063 BREAST TOMOSYNTHESIS BI: CPT

## 2018-12-28 PROCEDURE — 77067 SCR MAMMO BI INCL CAD: CPT

## 2019-01-14 DIAGNOSIS — E78.5 HYPERLIPIDEMIA, UNSPECIFIED HYPERLIPIDEMIA TYPE: Chronic | ICD-10-CM

## 2019-01-14 RX ORDER — ATORVASTATIN CALCIUM 40 MG/1
TABLET, FILM COATED ORAL
Qty: 90 TABLET | Refills: 3 | Status: SHIPPED | OUTPATIENT
Start: 2019-01-14 | End: 2020-01-09

## 2019-01-19 DIAGNOSIS — I10 BENIGN ESSENTIAL HYPERTENSION: Chronic | ICD-10-CM

## 2019-01-21 RX ORDER — HYDROCHLOROTHIAZIDE 25 MG/1
TABLET ORAL
Qty: 90 TABLET | Refills: 1 | Status: SHIPPED | OUTPATIENT
Start: 2019-01-21 | End: 2019-07-20 | Stop reason: SDUPTHER

## 2019-04-08 RX ORDER — AMLODIPINE BESYLATE AND BENAZEPRIL HYDROCHLORIDE 5; 40 MG/1; MG/1
CAPSULE ORAL
Qty: 90 CAPSULE | Refills: 1 | Status: SHIPPED | OUTPATIENT
Start: 2019-04-08 | End: 2019-10-05 | Stop reason: SDUPTHER

## 2019-05-20 DIAGNOSIS — E78.5 HYPERLIPIDEMIA, UNSPECIFIED HYPERLIPIDEMIA TYPE: Chronic | ICD-10-CM

## 2019-05-20 DIAGNOSIS — Z00.00 HEALTHCARE MAINTENANCE: ICD-10-CM

## 2019-05-20 DIAGNOSIS — E03.9 HYPOTHYROIDISM, UNSPECIFIED TYPE: Chronic | ICD-10-CM

## 2019-05-20 DIAGNOSIS — Z51.81 THERAPEUTIC DRUG MONITORING: ICD-10-CM

## 2019-05-20 DIAGNOSIS — E55.9 VITAMIN D DEFICIENCY: Chronic | ICD-10-CM

## 2019-05-20 DIAGNOSIS — E78.5 HYPERLIPIDEMIA, UNSPECIFIED HYPERLIPIDEMIA TYPE: Primary | Chronic | ICD-10-CM

## 2019-05-20 DIAGNOSIS — R73.01 IMPAIRED FASTING GLUCOSE: Chronic | ICD-10-CM

## 2019-05-21 LAB
25(OH)D3+25(OH)D2 SERPL-MCNC: 37.8 NG/ML (ref 30–100)
ALBUMIN SERPL-MCNC: 3.9 G/DL (ref 3.5–5.2)
ALBUMIN/GLOB SERPL: 1.4 G/DL
ALP SERPL-CCNC: 87 U/L (ref 39–117)
ALT SERPL-CCNC: 24 U/L (ref 1–33)
AST SERPL-CCNC: 22 U/L (ref 1–32)
BILIRUB SERPL-MCNC: 0.4 MG/DL (ref 0.2–1.2)
BUN SERPL-MCNC: 18 MG/DL (ref 8–23)
BUN/CREAT SERPL: 22.8 (ref 7–25)
CALCIUM SERPL-MCNC: 9.9 MG/DL (ref 8.6–10.5)
CHLORIDE SERPL-SCNC: 100 MMOL/L (ref 98–107)
CHOLEST SERPL-MCNC: 163 MG/DL (ref 100–199)
CK SERPL-CCNC: 62 U/L (ref 20–180)
CO2 SERPL-SCNC: 28 MMOL/L (ref 22–29)
CREAT SERPL-MCNC: 0.79 MG/DL (ref 0.57–1)
ERYTHROCYTE [DISTWIDTH] IN BLOOD BY AUTOMATED COUNT: 12.8 % (ref 12.3–15.4)
GLOBULIN SER CALC-MCNC: 2.8 GM/DL
GLUCOSE SERPL-MCNC: 105 MG/DL (ref 65–99)
HBA1C MFR BLD: 5.8 % (ref 4.8–5.6)
HCT VFR BLD AUTO: 41.5 % (ref 34–46.6)
HDL SERPL-SCNC: 42.6 UMOL/L
HDLC SERPL-MCNC: 66 MG/DL
HGB BLD-MCNC: 13.7 G/DL (ref 12–15.9)
LDL SERPL QN: 21.1 NM
LDL SERPL-SCNC: 813 NMOL/L
LDL SMALL SERPL-SCNC: 436 NMOL/L
LDLC SERPL CALC-MCNC: 72 MG/DL (ref 0–99)
MCH RBC QN AUTO: 31.9 PG (ref 26.6–33)
MCHC RBC AUTO-ENTMCNC: 33 G/DL (ref 31.5–35.7)
MCV RBC AUTO: 96.7 FL (ref 79–97)
PLATELET # BLD AUTO: 298 10*3/MM3 (ref 140–450)
POTASSIUM SERPL-SCNC: 4.6 MMOL/L (ref 3.5–5.2)
PROT SERPL-MCNC: 6.7 G/DL (ref 6–8.5)
RBC # BLD AUTO: 4.29 10*6/MM3 (ref 3.77–5.28)
SODIUM SERPL-SCNC: 140 MMOL/L (ref 136–145)
T3FREE SERPL-MCNC: 3.1 PG/ML (ref 2–4.4)
T4 FREE SERPL-MCNC: 1.07 NG/DL (ref 0.93–1.7)
TRIGL SERPL-MCNC: 125 MG/DL (ref 0–149)
TSH SERPL DL<=0.005 MIU/L-ACNC: 4.56 MIU/ML (ref 0.27–4.2)
WBC # BLD AUTO: 5.39 10*3/MM3 (ref 3.4–10.8)

## 2019-05-28 ENCOUNTER — OFFICE VISIT (OUTPATIENT)
Dept: INTERNAL MEDICINE | Facility: CLINIC | Age: 84
End: 2019-05-28

## 2019-05-28 VITALS
SYSTOLIC BLOOD PRESSURE: 132 MMHG | HEIGHT: 62 IN | OXYGEN SATURATION: 96 % | DIASTOLIC BLOOD PRESSURE: 56 MMHG | BODY MASS INDEX: 28.41 KG/M2 | HEART RATE: 66 BPM | WEIGHT: 154.4 LBS

## 2019-05-28 DIAGNOSIS — K86.9 PANCREATIC LESION: Chronic | ICD-10-CM

## 2019-05-28 DIAGNOSIS — Z51.81 THERAPEUTIC DRUG MONITORING: ICD-10-CM

## 2019-05-28 DIAGNOSIS — I65.23 ATHEROSCLEROSIS OF BOTH CAROTID ARTERIES: ICD-10-CM

## 2019-05-28 DIAGNOSIS — D75.89 MACROCYTOSIS: Chronic | ICD-10-CM

## 2019-05-28 DIAGNOSIS — M85.89 OSTEOPENIA OF MULTIPLE SITES: Chronic | ICD-10-CM

## 2019-05-28 DIAGNOSIS — I65.23 ATHEROSCLEROSIS OF BOTH CAROTID ARTERIES: Chronic | ICD-10-CM

## 2019-05-28 DIAGNOSIS — E78.5 HYPERLIPIDEMIA, UNSPECIFIED HYPERLIPIDEMIA TYPE: Chronic | ICD-10-CM

## 2019-05-28 DIAGNOSIS — E55.9 VITAMIN D DEFICIENCY: Chronic | ICD-10-CM

## 2019-05-28 DIAGNOSIS — R73.01 IMPAIRED FASTING GLUCOSE: Primary | Chronic | ICD-10-CM

## 2019-05-28 DIAGNOSIS — N32.89 IRRITABLE BLADDER: Chronic | ICD-10-CM

## 2019-05-28 DIAGNOSIS — E03.9 HYPOTHYROIDISM, UNSPECIFIED TYPE: Chronic | ICD-10-CM

## 2019-05-28 DIAGNOSIS — I10 BENIGN ESSENTIAL HYPERTENSION: Chronic | ICD-10-CM

## 2019-05-28 PROCEDURE — 99214 OFFICE O/P EST MOD 30 MIN: CPT | Performed by: INTERNAL MEDICINE

## 2019-05-28 NOTE — PROGRESS NOTES
05/28/2019    Patient Information  Stephanie Hawkins                                                                                          [address]  [facility] [address]      12/16/1932  [unfilled]  There is no work phone number on file.    Chief Complaint:     Follow-up impaired fasting glucose, hyperlipidemia, pancreatic lesion, macrocytosis, hypothyroidism, osteopenia, vitamin D deficiency, carotid artery plaque, hypertension.  No new acute complaints.    History of Present Illness:    Patient with a history of medical problems as outlined in chief complaint presents today for follow-up with lab prior in order to monitor her chronic medical issues.  She is concerned about the pancreatic lesion that was noted on the CT scan and wants to discuss getting a repeat study.  She also wants to report that her irritable bladder is well controlled with Myrbetriq but the medication is quite expensive.  Her past medical history reviewed and updated were necessary including health maintenance parameters.  This reveals she is up-to-date or else accounted for.  We did discuss the new shingles vaccine.  I have recommended this even though patient has already had the old Zostavax.    Review of Systems   Constitution: Negative.   HENT: Negative.    Eyes: Negative.    Cardiovascular: Negative.    Respiratory: Negative.    Endocrine: Negative.    Hematologic/Lymphatic: Negative.    Skin: Negative.    Musculoskeletal: Positive for arthritis.   Gastrointestinal: Negative.    Genitourinary: Negative.    Neurological: Negative.    Psychiatric/Behavioral: Negative.    Allergic/Immunologic: Negative.        Active Problems:    Patient Active Problem List   Diagnosis   • Benign essential hypertension   • Carotid artery plaque, 05/30/2017--16-49% left.  Mild plaque right.  05/30/2014--mild bilateral carotid plaque.   • Diverticulosis of colon   • Heart palpitations   • Hyperlipidemia   • Irritable bowel syndrome   •  Primary osteoarthritis of left knee   • Osteopenia, 06/20/2018--lumbar spine 0.4.  Right femoral neck -1.1.  Left femoral neck -0.8.  05/30/2014--lumbar spine 0.6.  Right femoral neck -1.2.  Left femoral neck -1.7.   • Vitamin D deficiency   • Therapeutic drug monitoring   • Glaucoma, bilateral   • Menopausal state   • Irritable bladder   • Macrocytosis   • Hypothyroidism   • Impaired fasting glucose   • Pancreatic lesion, 4 mm, repeat study due June 2019         Past Medical History:   Diagnosis Date   • Benign essential hypertension 2/26/2001 02/26/2001--patient was seen as a new patient. She was ordered being treated for hypertension at that time.   • Carotid artery plaque, 05/30/2014--mild bilateral carotid plaque. 1/8/2002 05/30/2014--vascular screen reveals mild bilateral carotid plaque, negative for AAA, negative for PAD.   06/19/2012--vascular screen revealed bilateral, less than 50% internal carotid artery stenosis, negative for AAA, negative for PAD.   05/10/2010--carotid Doppler revealed 16-49% stenosis in the right and left internal carotid arteries.   01/08/2002--vascular screen revealed mild bilateral carotid artery plaque, negative for AAA, negative for PAD.   • Diverticulosis of colon 10/16/2001    11/03/2006--the entire examined colon was normal except for scattered diverticuli.   10/16/2001--colonoscopy revealed a few left-sided scattered diverticuli.   • Glaucoma, bilateral 6/1/2016 06/01/2016--routine follow-up.  She reports she was diagnosed with glaucoma about a month or so ago treated with Travatan eyedrops.   • Heart palpitations 10/13/2008    Patient has at a long history of heart palpitations.   10/13/2008--24-hour Holter monitor reveals an average heart rate of 69, a minimum heart rate was 55. Maximum heart rate was 98. There were no pauses. Ventricular ectopy was zero. Supraventricular ectopy was 5585, with 15 supraventricular runs. Supraventricular bigeminy events were 6 and  supraventricular trigeminy events were one. There was no atrial fibrillation/flutter. No ST segment changes. Patient's heart palpitations related to frequent PACs which have been controlled with atenolol.   • Hyperlipidemia 2/26/2001 02/26/2001--patient was seen as a new patient. She was already on cholesterol medication at that time.   • Impaired fasting glucose 6/14/2018 06/14/2018--routine follow-up.  Fasting serum glucose mildly elevated at 102.  Recommend low carbohydrate diet and weight loss.  Exercise.   • Irritable bladder 8/29/2017 08/29/2017--patient seen in follow-up after recent urinary tract infection with complaints of nocturia ×4-5 per night as well as frequency and urgency.  Repeat urinalysis is negative.  Urine culture sent.  Given the fact the patient is had the urinary symptoms prior to the onset of the recent UTI, I will treat her empirically for irritable bladder.  Myrbetriq 25 mg per day.  May increase to 50 mg per day if necessary.  I will have her follow-up in about 3 weeks to reassess his situation.   • Irritable bowel syndrome 4/27/2016   • Macrocytosis 12/5/2017 12/05/2017--routine follow-up.  CBC is normal except MCV elevated at 99.5.  Homocystine, methylmalonic acid, serum B-12 and folic acid levels ordered.   • Menopausal state 5/17/2017   • Osteopenia, 06/20/2018--lumbar spine 0.4.  Right femoral neck -1.1.  Left femoral neck -0.8.  05/30/2014--lumbar spine 0.6.  Right femoral neck -1.2.  Left femoral neck -1.7. 6/9/2006 06/20/2018--DEXA scan reveals average lumbar T score 0.4.  Right femoral neck T score -1.1.  Left femoral neck T score -0.8.  Assessment is osteopenia and compared to previous study in 2016 there is been no change in the right femoral neck and 17.2% improvement in the left femoral neck and a 2.1% decrease in the lumbar spine.  05/30/2014--DEXA scan reveals average lumbar spine T score of 0.6. Right femoral neck T score -1.2. Left femoral neck T score  "-1.7. Osteopenia. Compared to the previous examination of 11/09/2010, there is a 1% decrease in the left hip bone mineral density, 2.1% decrease in the right hip bone density, and a 1.6% improvement in the lumbar spine bone density.   11/22/2010--treatment for osteopenia begun with Fosamax but patient discontinued it on her own.   11/09/2010--DEXA scan revealed average lumbar spine T score 0.5. Left femoral neck T score -1.4. Right femoral neck T score -1.1. Osteopenia.   06/09/2006--DEXA scan reveals lumbar spine T score 0.6. Left hip T score -0.8, left    • Pancreatic lesion, 4 mm, repeat study due June 2019 6/27/2018 11/27/2018--patient seen in follow-up and reports she is having urinary frequency without dysuria, but no incontinence and occasionally has some urgency.  She has no pain at the present time.  Apparently she saw a gynecologist and he did not examine her because \"we do not do Pap smears on a woman year age\".  06/27/2018--patient seen in follow-up and the results of the CT scan discussed.  She lu   • Possible Pelvic congestion syndrome 6/27/2018 11/27/2018--patient seen in follow-up and reports she is having urinary frequency without dysuria, but no incontinence and occasionally has some urgency.  She has no pain at the present time.  Apparently she saw a gynecologist and he did not examine her because \"we do not do Pap smears on a woman year age\".  06/27/2018--patient seen in follow-up and the results of the CT scan discussed.  She lu   • Primary osteoarthritis of left knee 3/23/2004    03/23/2004--patient was evaluated by the orthopedist for left knee pain. X-rays revealed osteoarthritis with some medial joint space narrowing, subchondral sclerosis, and patellofemoral spurs. Cortisone injections were given.   • Vitamin D deficiency 4/27/2016         Past Surgical History:   Procedure Laterality Date   • CATARACT EXTRACTION Right 01/2014 January 2014--cataract extirpation and " intraocular lens implantation right eye.    • CATARACT EXTRACTION Left 2010--cataract extirpation and intraocular lens implantation in the left eye.   • COLONOSCOPY  2006--the entire examined colon was normal except for scattered diverticuli.    • COLONOSCOPY  10/16/2001    10/16/2001--colonoscopy revealed a few left-sided scattered diverticuli.         Allergies   Allergen Reactions   • Neomycin-Bacitracin Zn-Polymyx Swelling     Neosporin   • Sulfa Antibiotics Hives           Current Outpatient Medications:   •  amLODIPine-benazepril (LOTREL) 5-40 MG per capsule, TAKE 1 CAPSULE DAILY, Disp: 90 capsule, Rfl: 1  •  atorvastatin (LIPITOR) 40 MG tablet, TAKE 1 TABLET DAILY EVERY EVENING FOR CHOLESTEROL, Disp: 90 tablet, Rfl: 3  •  Cholecalciferol (VITAMIN D3) 5000 UNITS capsule capsule, 1 by mouth daily as directed, Disp: 30 capsule, Rfl:   •  Garlic 1000 MG capsule, Take  by mouth., Disp: , Rfl:   •  hydrochlorothiazide (HYDRODIURIL) 25 MG tablet, TAKE 1 TABLET DAILY (NEED LABS AND FOLLOW UP AS SOON AS POSSIBLE), Disp: 90 tablet, Rfl: 1  •  metoprolol succinate XL (TOPROL-XL) 25 MG 24 hr tablet, TAKE 1 TABLET DAILY FOR BLOOD PRESSURE AND HEART AND PALPITATIONS, Disp: 90 tablet, Rfl: 1  •  Mirabegron ER (MYRBETRIQ) 50 MG tablet sustained-release 24 hour 24 hr tablet, Take 1 capsule daily for irritable bladder, Disp: 30 tablet, Rfl: 3  •  Multiple Vitamin (MULTI VITAMIN DAILY PO), Take  by mouth daily., Disp: , Rfl:   •  Multiple Vitamins-Minerals (PRESERVISION AREDS PO), Take  by mouth Every Night., Disp: , Rfl:   •  aspirin 81 MG tablet, Take  by mouth., Disp: , Rfl:   •  TRAVATAN Z 0.004 % solution ophthalmic solution, 1 drop Every Night., Disp: , Rfl:       Family History   Problem Relation Age of Onset   • Cirrhosis Mother         Of Liver. Mother  at age 62 from alcoholic cirrhosis.   • Heart attack Father         Acute Myocardial Infarction.  Father  of a  "myocardial infarction at age 68.         Social History     Socioeconomic History   • Marital status:      Spouse name: Az   • Number of children: 3   • Years of education: Not on file   • Highest education level: Bachelor's degree (e.g., BA, AB, BS)   Occupational History   • Occupation: Retired (2011) [employer]   Social Needs   • Financial resource strain: Not hard at all   • Food insecurity:     Worry: Never true     Inability: Never true   • Transportation needs:     Medical: No     Non-medical: No   Tobacco Use   • Smoking status: Never Smoker   • Smokeless tobacco: Never Used   Substance and Sexual Activity   • Alcohol use: No   • Drug use: No   • Sexual activity: Not Currently     Partners: Male   Lifestyle   • Physical activity:     Days per week: 4 days     Minutes per session: 60 min   • Stress: To some extent   Relationships   • Social connections:     Talks on phone: Three times a week     Gets together: Once a week     Attends [employer] service: More than 4 times per year     Active member of club or organization: Yes     Attends meetings of clubs or organizations: More than 4 times per year     Relationship status:          Vitals:    05/28/19 1332   BP: 132/56   BP Location: Right arm   Pulse: 66   SpO2: 96%   Weight: 70 kg (154 lb 6.4 oz)   Height: 157.5 cm (62.01\")          Physical Exam:    General: Alert and oriented x 3.  No acute distress.  Normal affect.  HEENT: Pupils equal, round, reactive to light; extraocular movements intact; sclerae nonicteric; pharynx, ear canals and TMs normal.  Neck: Without JVD, thyromegaly, bruit, or adenopathy.  Lungs: Clear to auscultation in all fields.  Heart: Regular rate and rhythm without murmur, rub, gallop, or click.  Abdomen: Soft, nontender, without hepatosplenomegaly or hernia.  Bowel sounds normal.  : Deferred.  Rectal: Deferred.  Extremities: Without clubbing, cyanosis, edema, or pulse deficit.  Neurologic: " Intact without focal deficit.  Normal station and gait observed during ingress and egress from the examination room.  Skin: Without significant lesion.  Musculoskeletal: Unremarkable.    Lab/other results:    NMR is essentially perfect.  CMP normal except blood sugar elevated 105.  CBC normal.  Hemoglobin A1c 5.8.  TSH is slightly elevated at 4.56.  Free T4 normal at 1.07.  Free T3 normal at 3.1.  CPK normal.    Assessment/Plan:     Diagnosis Plan   1. Impaired fasting glucose     2. Hyperlipidemia, unspecified hyperlipidemia type     3. Pancreatic lesion, 4 mm, repeat study due June 2019     4. Macrocytosis     5. Hypothyroidism, unspecified type     6. Osteopenia of multiple sites     7. Vitamin D deficiency     8. Carotid artery plaque, 05/30/2017--16-49% left.  Mild plaque right.  05/30/2014--mild bilateral carotid plaque.     9. Benign essential hypertension     10. Therapeutic drug monitoring     11. Irritable bladder       Patient has very mild impaired fasting glucose that does not require medication.  Hyperlipidemia is under excellent control with Lipitor.  Patient has a pancreatic lesion that is quite small at 4 mm and is due for repeat CT scan.  Macrocytosis has resolved and I suspect was initially laboratory error.  Patient has subclinical hypothyroidism which we will continue to monitor.  She has osteopenia and is up-to-date on her DEXA scan.  However, she is overdue for a carotid Doppler study to assess the carotid artery plaque.  Blood pressure well controlled.  Irritable bladder well controlled with Myrbetriq.    Plan is as follows: CT scan of the abdomen with contrast ordered.  Carotid Doppler study ordered.  Patient will follow-up after the results are known and this also should be after June 14, 2019 so we can do her subsequent Medicare wellness visit.      Procedures

## 2019-06-08 DIAGNOSIS — R00.2 HEART PALPITATIONS: Chronic | ICD-10-CM

## 2019-06-08 DIAGNOSIS — I10 BENIGN ESSENTIAL HYPERTENSION: Chronic | ICD-10-CM

## 2019-06-10 RX ORDER — METOPROLOL SUCCINATE 25 MG/1
TABLET, EXTENDED RELEASE ORAL
Qty: 90 TABLET | Refills: 1 | Status: SHIPPED | OUTPATIENT
Start: 2019-06-10 | End: 2020-10-08 | Stop reason: SDUPTHER

## 2019-06-13 ENCOUNTER — HOSPITAL ENCOUNTER (OUTPATIENT)
Dept: CARDIOLOGY | Facility: HOSPITAL | Age: 84
Discharge: HOME | End: 2019-06-13
Admitting: INTERNAL MEDICINE

## 2019-06-13 ENCOUNTER — HOSPITAL ENCOUNTER (OUTPATIENT)
Dept: CT IMAGING | Facility: HOSPITAL | Age: 84
Discharge: HOME | End: 2019-06-13

## 2019-06-13 LAB
BH CV XLRA MEAS LEFT DIST CCA EDV: 15.8 CM/SEC
BH CV XLRA MEAS LEFT DIST CCA PSV: 81.5 CM/SEC
BH CV XLRA MEAS LEFT DIST ICA EDV: -24.6 CM/SEC
BH CV XLRA MEAS LEFT DIST ICA PSV: -97.9 CM/SEC
BH CV XLRA MEAS LEFT MID ICA EDV: -21.1 CM/SEC
BH CV XLRA MEAS LEFT MID ICA PSV: -76.2 CM/SEC
BH CV XLRA MEAS LEFT PROX CCA EDV: 12.3 CM/SEC
BH CV XLRA MEAS LEFT PROX CCA PSV: 83.8 CM/SEC
BH CV XLRA MEAS LEFT PROX ECA EDV: 7.6 CM/SEC
BH CV XLRA MEAS LEFT PROX ECA PSV: 79.2 CM/SEC
BH CV XLRA MEAS LEFT PROX ICA EDV: -9.3 CM/SEC
BH CV XLRA MEAS LEFT PROX ICA PSV: -69.8 CM/SEC
BH CV XLRA MEAS LEFT PROX SCLA PSV: 121 CM/SEC
BH CV XLRA MEAS LEFT VERTEBRAL A EDV: -11.7 CM/SEC
BH CV XLRA MEAS LEFT VERTEBRAL A PSV: -72.7 CM/SEC
BH CV XLRA MEAS RIGHT DIST CCA EDV: -14.1 CM/SEC
BH CV XLRA MEAS RIGHT DIST CCA PSV: -66.8 CM/SEC
BH CV XLRA MEAS RIGHT DIST ICA EDV: -20.5 CM/SEC
BH CV XLRA MEAS RIGHT DIST ICA PSV: -72.7 CM/SEC
BH CV XLRA MEAS RIGHT MID ICA EDV: 23.6 CM/SEC
BH CV XLRA MEAS RIGHT MID ICA PSV: 91.2 CM/SEC
BH CV XLRA MEAS RIGHT PROX CCA EDV: 15.2 CM/SEC
BH CV XLRA MEAS RIGHT PROX CCA PSV: 85 CM/SEC
BH CV XLRA MEAS RIGHT PROX ECA EDV: -6.8 CM/SEC
BH CV XLRA MEAS RIGHT PROX ECA PSV: -76 CM/SEC
BH CV XLRA MEAS RIGHT PROX ICA EDV: -15.2 CM/SEC
BH CV XLRA MEAS RIGHT PROX ICA PSV: -89.6 CM/SEC
BH CV XLRA MEAS RIGHT PROX SCLA PSV: 90.3 CM/SEC
BH CV XLRA MEAS RIGHT VERTEBRAL A EDV: -6.4 CM/SEC
BH CV XLRA MEAS RIGHT VERTEBRAL A PSV: -39.9 CM/SEC
CREAT BLDA-MCNC: 0.8 MG/DL (ref 0.6–1.3)
LEFT ARM BP: NORMAL MMHG
RIGHT ARM BP: NORMAL MMHG

## 2019-06-13 PROCEDURE — 82565 ASSAY OF CREATININE: CPT

## 2019-06-13 PROCEDURE — 74177 CT ABD & PELVIS W/CONTRAST: CPT

## 2019-06-13 PROCEDURE — 93880 EXTRACRANIAL BILAT STUDY: CPT

## 2019-06-13 PROCEDURE — 25010000002 IOPAMIDOL 61 % SOLUTION: Performed by: INTERNAL MEDICINE

## 2019-06-13 PROCEDURE — 0 DIATRIZOATE MEGLUMINE & SODIUM PER 1 ML: Performed by: INTERNAL MEDICINE

## 2019-06-13 RX ADMIN — DIATRIZOATE MEGLUMINE AND DIATRIZOATE SODIUM 30 ML: 660; 100 LIQUID ORAL; RECTAL at 14:40

## 2019-06-13 RX ADMIN — IOPAMIDOL 85 ML: 612 INJECTION, SOLUTION INTRAVENOUS at 14:40

## 2019-06-17 ENCOUNTER — TELEPHONE (OUTPATIENT)
Dept: INTERNAL MEDICINE | Facility: CLINIC | Age: 84
End: 2019-06-17

## 2019-06-17 NOTE — TELEPHONE ENCOUNTER
The CT scan of the abdomen shows no pancreatic mass and the radiologist does not feel that we need to follow this with repeat CT scans any longer.  Her carotid Doppler study only showed minimal carotid plaque.  Nothing to worry about.

## 2019-07-20 DIAGNOSIS — I10 BENIGN ESSENTIAL HYPERTENSION: Chronic | ICD-10-CM

## 2019-07-22 RX ORDER — HYDROCHLOROTHIAZIDE 25 MG/1
TABLET ORAL
Qty: 90 TABLET | Refills: 1 | Status: SHIPPED | OUTPATIENT
Start: 2019-07-22 | End: 2019-08-22

## 2019-08-13 ENCOUNTER — TELEPHONE (OUTPATIENT)
Dept: INTERNAL MEDICINE | Facility: CLINIC | Age: 84
End: 2019-08-13

## 2019-08-13 ENCOUNTER — OFFICE VISIT (OUTPATIENT)
Dept: INTERNAL MEDICINE | Facility: CLINIC | Age: 84
End: 2019-08-13

## 2019-08-13 VITALS
DIASTOLIC BLOOD PRESSURE: 68 MMHG | WEIGHT: 153 LBS | HEIGHT: 62 IN | SYSTOLIC BLOOD PRESSURE: 120 MMHG | HEART RATE: 58 BPM | OXYGEN SATURATION: 98 % | BODY MASS INDEX: 28.16 KG/M2

## 2019-08-13 DIAGNOSIS — N39.0 ACUTE LOWER URINARY TRACT INFECTION: Primary | ICD-10-CM

## 2019-08-13 LAB
BILIRUB BLD-MCNC: NEGATIVE MG/DL
CLARITY, POC: CLEAR
COLOR UR: ABNORMAL
GLUCOSE UR STRIP-MCNC: NEGATIVE MG/DL
KETONES UR QL: NEGATIVE
LEUKOCYTE EST, POC: ABNORMAL
NITRITE UR-MCNC: NEGATIVE MG/ML
PH UR: 6 [PH] (ref 5–8)
PROT UR STRIP-MCNC: NEGATIVE MG/DL
RBC # UR STRIP: NEGATIVE /UL
SP GR UR: 1.02 (ref 1–1.03)
UROBILINOGEN UR QL: ABNORMAL

## 2019-08-13 PROCEDURE — 81003 URINALYSIS AUTO W/O SCOPE: CPT | Performed by: INTERNAL MEDICINE

## 2019-08-13 PROCEDURE — 99213 OFFICE O/P EST LOW 20 MIN: CPT | Performed by: INTERNAL MEDICINE

## 2019-08-13 RX ORDER — TIMOLOL MALEATE 5 MG/ML
SOLUTION/ DROPS OPHTHALMIC
COMMUNITY
Start: 2019-07-23 | End: 2020-10-08 | Stop reason: SDUPTHER

## 2019-08-13 RX ORDER — CIPROFLOXACIN 500 MG/1
TABLET, FILM COATED ORAL
Qty: 10 TABLET | Refills: 0 | Status: SHIPPED | OUTPATIENT
Start: 2019-08-13 | End: 2019-08-22

## 2019-08-13 NOTE — PROGRESS NOTES
08/13/2019    Patient Information  Stephanie Hawkins                                                                                          [address]  [facility] [address]      12/16/1932  [unfilled]  There is no work phone number on file.    Chief Complaint:     Complaining of problem with urination.  Possible urinary tract infection.    History of Present Illness:    Patient with a history of previous urinary tract infections, hypertension, carotid artery plaque, heart palpitations, hyperlipidemia, IBS, osteopenia, hypothyroidism, impaired fasting glucose, possible pancreatic lesion.  She presents today with complaints of feeling poorly and associated with burning with urination for the past 4 to 5 days.  She had no fever, chills, or other systemic signs or symptoms.  Past medical history reviewed and updated were necessary including health maintenance parameters.  This reveals she is overdue for her Medicare wellness visit.    Review of Systems   Constitution: Negative. Negative for chills and fever.   HENT: Negative.    Eyes: Negative.    Cardiovascular: Negative.    Respiratory: Negative.    Endocrine: Negative.    Hematologic/Lymphatic: Negative.    Skin: Negative.    Musculoskeletal: Negative.    Gastrointestinal: Negative.    Genitourinary: Positive for dysuria, frequency, pelvic pain and urgency.   Neurological: Negative.    Psychiatric/Behavioral: Negative.    Allergic/Immunologic: Negative.        Active Problems:    Patient Active Problem List   Diagnosis   • Benign essential hypertension   • Carotid artery plaque, 05/30/2017--16-49% left.  Mild plaque right.  05/30/2014--mild bilateral carotid plaque.   • Diverticulosis of colon   • Heart palpitations   • Hyperlipidemia   • Irritable bowel syndrome   • Primary osteoarthritis of left knee   • Osteopenia, 06/20/2018--lumbar spine 0.4.  Right femoral neck -1.1.  Left femoral neck -0.8.  05/30/2014--lumbar spine 0.6.  Right femoral neck  -1.2.  Left femoral neck -1.7.   • Vitamin D deficiency   • Therapeutic drug monitoring   • Glaucoma, bilateral   • Menopausal state   • Irritable bladder   • Hypothyroidism   • Impaired fasting glucose   • Pancreatic lesion, 4 mm, repeat study due June 2019   • Acute lower urinary tract infection         Past Medical History:   Diagnosis Date   • Benign essential hypertension 2/26/2001 02/26/2001--patient was seen as a new patient. She was ordered being treated for hypertension at that time.   • Carotid artery plaque, 05/30/2014--mild bilateral carotid plaque. 1/8/2002 05/30/2014--vascular screen reveals mild bilateral carotid plaque, negative for AAA, negative for PAD.   06/19/2012--vascular screen revealed bilateral, less than 50% internal carotid artery stenosis, negative for AAA, negative for PAD.   05/10/2010--carotid Doppler revealed 16-49% stenosis in the right and left internal carotid arteries.   01/08/2002--vascular screen revealed mild bilateral carotid artery plaque, negative for AAA, negative for PAD.   • Diverticulosis of colon 10/16/2001    11/03/2006--the entire examined colon was normal except for scattered diverticuli.   10/16/2001--colonoscopy revealed a few left-sided scattered diverticuli.   • Glaucoma, bilateral 6/1/2016 06/01/2016--routine follow-up.  She reports she was diagnosed with glaucoma about a month or so ago treated with Travatan eyedrops.   • Heart palpitations 10/13/2008    Patient has at a long history of heart palpitations.   10/13/2008--24-hour Holter monitor reveals an average heart rate of 69, a minimum heart rate was 55. Maximum heart rate was 98. There were no pauses. Ventricular ectopy was zero. Supraventricular ectopy was 5585, with 15 supraventricular runs. Supraventricular bigeminy events were 6 and supraventricular trigeminy events were one. There was no atrial fibrillation/flutter. No ST segment changes. Patient's heart palpitations related to frequent PACs  which have been controlled with atenolol.   • Hyperlipidemia 2/26/2001 02/26/2001--patient was seen as a new patient. She was already on cholesterol medication at that time.   • Hypothyroidism 12/5/2017    May 28, 2019--routine follow-up.  TSH slightly elevated at 4.56.  Free T3 and free T4 are normal.  Continued monitoring.  12/05/2017--routine follow-up.  TSH elevated at 5.19.  Free T3 and free T4 are normal.  Repeat thyroid function tests ordered.  Thyroid antibodies ordered.  If these confirm diagnosis of hypothyroidism and we will proceed with thyroid ultrasound.   • Impaired fasting glucose 6/14/2018 06/14/2018--routine follow-up.  Fasting serum glucose mildly elevated at 102.  Recommend low carbohydrate diet and weight loss.  Exercise.   • Irritable bladder 8/29/2017 08/29/2017--patient seen in follow-up after recent urinary tract infection with complaints of nocturia ×4-5 per night as well as frequency and urgency.  Repeat urinalysis is negative.  Urine culture sent.  Given the fact the patient is had the urinary symptoms prior to the onset of the recent UTI, I will treat her empirically for irritable bladder.  Myrbetriq 25 mg per day.  May increase to 50 mg per day if necessary.  I will have her follow-up in about 3 weeks to reassess his situation.   • Irritable bowel syndrome 4/27/2016   • Menopausal state 5/17/2017   • Osteopenia, 06/20/2018--lumbar spine 0.4.  Right femoral neck -1.1.  Left femoral neck -0.8.  05/30/2014--lumbar spine 0.6.  Right femoral neck -1.2.  Left femoral neck -1.7. 6/9/2006 06/20/2018--DEXA scan reveals average lumbar T score 0.4.  Right femoral neck T score -1.1.  Left femoral neck T score -0.8.  Assessment is osteopenia and compared to previous study in 2016 there is been no change in the right femoral neck and 17.2% improvement in the left femoral neck and a 2.1% decrease in the lumbar spine.  05/30/2014--DEXA scan reveals average lumbar spine T score of 0.6. Right  "femoral neck T score -1.2. Left femoral neck T score -1.7. Osteopenia. Compared to the previous examination of 11/09/2010, there is a 1% decrease in the left hip bone mineral density, 2.1% decrease in the right hip bone density, and a 1.6% improvement in the lumbar spine bone density.   11/22/2010--treatment for osteopenia begun with Fosamax but patient discontinued it on her own.   11/09/2010--DEXA scan revealed average lumbar spine T score 0.5. Left femoral neck T score -1.4. Right femoral neck T score -1.1. Osteopenia.   06/09/2006--DEXA scan reveals lumbar spine T score 0.6. Left hip T score -0.8, left    • Pancreatic lesion, 4 mm, repeat study due June 2019 6/27/2018 11/27/2018--patient seen in follow-up and reports she is having urinary frequency without dysuria, but no incontinence and occasionally has some urgency.  She has no pain at the present time.  Apparently she saw a gynecologist and he did not examine her because \"we do not do Pap smears on a woman year age\".  06/27/2018--patient seen in follow-up and the results of the CT scan discussed.  She lu   • Possible Pelvic congestion syndrome 6/27/2018 11/27/2018--patient seen in follow-up and reports she is having urinary frequency without dysuria, but no incontinence and occasionally has some urgency.  She has no pain at the present time.  Apparently she saw a gynecologist and he did not examine her because \"we do not do Pap smears on a woman year age\".  06/27/2018--patient seen in follow-up and the results of the CT scan discussed.  She lu   • Primary osteoarthritis of left knee 3/23/2004    03/23/2004--patient was evaluated by the orthopedist for left knee pain. X-rays revealed osteoarthritis with some medial joint space narrowing, subchondral sclerosis, and patellofemoral spurs. Cortisone injections were given.   • Vitamin D deficiency 4/27/2016         Past Surgical History:   Procedure Laterality Date   • CATARACT EXTRACTION Right 01/2014 "    2014--cataract extirpation and intraocular lens implantation right eye.    • CATARACT EXTRACTION Left 2010--cataract extirpation and intraocular lens implantation in the left eye.   • COLONOSCOPY  2006--the entire examined colon was normal except for scattered diverticuli.    • COLONOSCOPY  10/16/2001    10/16/2001--colonoscopy revealed a few left-sided scattered diverticuli.         Allergies   Allergen Reactions   • Neomycin-Bacitracin Zn-Polymyx Swelling     Neosporin   • Sulfa Antibiotics Hives           Current Outpatient Medications:   •  amLODIPine-benazepril (LOTREL) 5-40 MG per capsule, TAKE 1 CAPSULE DAILY, Disp: 90 capsule, Rfl: 1  •  atorvastatin (LIPITOR) 40 MG tablet, TAKE 1 TABLET DAILY EVERY EVENING FOR CHOLESTEROL, Disp: 90 tablet, Rfl: 3  •  Cholecalciferol (VITAMIN D3) 5000 UNITS capsule capsule, 1 by mouth daily as directed, Disp: 30 capsule, Rfl:   •  Garlic 1000 MG capsule, Take  by mouth., Disp: , Rfl:   •  hydrochlorothiazide (HYDRODIURIL) 25 MG tablet, TAKE 1 TABLET DAILY (NEED LABS AND FOLLOW UP AS SOON AS POSSIBLE), Disp: 90 tablet, Rfl: 1  •  metoprolol succinate XL (TOPROL-XL) 25 MG 24 hr tablet, TAKE 1 TABLET DAILY FOR BLOOD PRESSURE AND HEART AND PALPITATIONS, Disp: 90 tablet, Rfl: 1  •  Mirabegron ER (MYRBETRIQ) 50 MG tablet sustained-release 24 hour 24 hr tablet, Take 1 capsule daily for irritable bladder, Disp: 30 tablet, Rfl: 3  •  Multiple Vitamin (MULTI VITAMIN DAILY PO), Take  by mouth daily., Disp: , Rfl:   •  Multiple Vitamins-Minerals (PRESERVISION AREDS PO), Take  by mouth Every Night., Disp: , Rfl:   •  TRAVATAN Z 0.004 % solution ophthalmic solution, 1 drop Every Night., Disp: , Rfl:   •  aspirin 81 MG tablet, Take  by mouth., Disp: , Rfl:   •  timolol (TIMOPTIC) 0.5 % ophthalmic solution, , Disp: , Rfl:       Family History   Problem Relation Age of Onset   • Cirrhosis Mother         Of Liver. Mother  at age 62 from  "alcoholic cirrhosis.   • Heart attack Father         Acute Myocardial Infarction.  Father  of a myocardial infarction at age 68.         Social History     Socioeconomic History   • Marital status:      Spouse name: Az   • Number of children: 3   • Years of education: Not on file   • Highest education level: Bachelor's degree (e.g., BA, AB, BS)   Occupational History   • Occupation: Retired () [employer]   Social Needs   • Financial resource strain: Not hard at all   • Food insecurity:     Worry: Never true     Inability: Never true   • Transportation needs:     Medical: No     Non-medical: No   Tobacco Use   • Smoking status: Never Smoker   • Smokeless tobacco: Never Used   Substance and Sexual Activity   • Alcohol use: No   • Drug use: No   • Sexual activity: Not Currently     Partners: Male   Lifestyle   • Physical activity:     Days per week: 4 days     Minutes per session: 60 min   • Stress: To some extent   Relationships   • Social connections:     Talks on phone: Three times a week     Gets together: Once a week     Attends Yazidism service: More than 4 times per year     Active member of club or organization: Yes     Attends meetings of clubs or organizations: More than 4 times per year     Relationship status:          Vitals:    19 1442   BP: 120/68   BP Location: Right arm   Patient Position: Sitting   Cuff Size: Adult   Pulse: 58   SpO2: 98%   Weight: 69.4 kg (153 lb)   Height: 157.5 cm (62.01\")          Physical Exam:    General: Alert and oriented x 3.  No acute distress.  Normal affect.  HEENT: Pupils equal, round, reactive to light; extraocular movements intact; sclerae nonicteric; pharynx, ear canals and TMs normal.  Neck: Without JVD, thyromegaly, bruit, or adenopathy.  Lungs: Clear to auscultation in all fields.  Heart: Regular rate and rhythm without murmur, rub, gallop, or click.  Abdomen: Soft, nontender, without hepatosplenomegaly or " hernia.  Bowel sounds normal.  : Deferred.  Rectal: Deferred.  Extremities: Without clubbing, cyanosis, edema, or pulse deficit.  Neurologic: Intact without focal deficit.  Normal station and gait observed during ingress and egress from the examination room.  Skin: Without significant lesion.  Musculoskeletal: Unremarkable.    Lab/other results:    Automated urinalysis reveals 3+ leukocytes, nitrite negative, blood negative.    Assessment/Plan:     Diagnosis Plan   1. Acute lower urinary tract infection       Assessment/plan:    Patient presents with a history certainly consistent with urinary tract infection.  She does have pyuria and I will treat her on that basis along with her symptoms.  Patient cannot take Bactrim and therefore I will treat her with Cipro 500 mg p.o. twice daily x5 days.  I would rather avoid Macrobid if at all possible.  Urine culture sent.  I will repeat assess patient's symptoms next week when she comes in for her Medicare wellness visit.            Procedures

## 2019-08-13 NOTE — TELEPHONE ENCOUNTER
Patient called and said she believes she has a bladder infection and wants to know if she should bring in a sample for labs in the morning

## 2019-08-22 ENCOUNTER — OFFICE VISIT (OUTPATIENT)
Dept: INTERNAL MEDICINE | Facility: CLINIC | Age: 84
End: 2019-08-22

## 2019-08-22 VITALS
WEIGHT: 153.2 LBS | DIASTOLIC BLOOD PRESSURE: 60 MMHG | SYSTOLIC BLOOD PRESSURE: 128 MMHG | BODY MASS INDEX: 28.19 KG/M2 | OXYGEN SATURATION: 97 % | HEART RATE: 58 BPM | HEIGHT: 62 IN

## 2019-08-22 DIAGNOSIS — E55.9 VITAMIN D DEFICIENCY: Chronic | ICD-10-CM

## 2019-08-22 DIAGNOSIS — R73.01 IMPAIRED FASTING GLUCOSE: Chronic | ICD-10-CM

## 2019-08-22 DIAGNOSIS — Z51.81 THERAPEUTIC DRUG MONITORING: ICD-10-CM

## 2019-08-22 DIAGNOSIS — Z00.00 MEDICARE ANNUAL WELLNESS VISIT, SUBSEQUENT: Primary | ICD-10-CM

## 2019-08-22 DIAGNOSIS — E78.5 HYPERLIPIDEMIA, UNSPECIFIED HYPERLIPIDEMIA TYPE: Chronic | ICD-10-CM

## 2019-08-22 DIAGNOSIS — N39.0 ACUTE LOWER URINARY TRACT INFECTION: ICD-10-CM

## 2019-08-22 DIAGNOSIS — I10 BENIGN ESSENTIAL HYPERTENSION: Chronic | ICD-10-CM

## 2019-08-22 DIAGNOSIS — K86.9 PANCREATIC LESION: Chronic | ICD-10-CM

## 2019-08-22 DIAGNOSIS — E03.9 HYPOTHYROIDISM, UNSPECIFIED TYPE: Chronic | ICD-10-CM

## 2019-08-22 DIAGNOSIS — I65.23 ATHEROSCLEROSIS OF BOTH CAROTID ARTERIES: Chronic | ICD-10-CM

## 2019-08-22 PROCEDURE — G0439 PPPS, SUBSEQ VISIT: HCPCS | Performed by: INTERNAL MEDICINE

## 2019-08-22 PROCEDURE — 99214 OFFICE O/P EST MOD 30 MIN: CPT | Performed by: INTERNAL MEDICINE

## 2019-08-22 RX ORDER — HYDROCHLOROTHIAZIDE 25 MG/1
TABLET ORAL
Qty: 90 TABLET | Refills: 1
Start: 2019-08-22 | End: 2020-01-20

## 2019-08-22 NOTE — PROGRESS NOTES
The ABCs of the Annual Wellness Visit  Subsequent Medicare Wellness Visit    No chief complaint on file.      Subjective   History of Present Illness:  Stephanie Hawkins is a 86 y.o. female who presents for a Subsequent Medicare Wellness Visit.    HEALTH RISK ASSESSMENT    Recent Hospitalizations:  No hospitalization(s) within the last year.    Current Medical Providers:  Patient Care Team:  Jeovany Dietrich MD as PCP - General (Internal Medicine)    Smoking Status:  Social History     Tobacco Use   Smoking Status Never Smoker   Smokeless Tobacco Never Used       Alcohol Consumption:  Social History     Substance and Sexual Activity   Alcohol Use No       Depression Screen:   PHQ-2/PHQ-9 Depression Screening 8/22/2019   Little interest or pleasure in doing things 0   Feeling down, depressed, or hopeless 0   Trouble falling or staying asleep, or sleeping too much -   Feeling tired or having little energy -   Poor appetite or overeating -   Feeling bad about yourself - or that you are a failure or have let yourself or your family down -   Trouble concentrating on things, such as reading the newspaper or watching television -   Moving or speaking so slowly that other people could have noticed. Or the opposite - being so fidgety or restless that you have been moving around a lot more than usual -   Thoughts that you would be better off dead, or of hurting yourself in some way -   Total Score 0   If you checked off any problems, how difficult have these problems made it for you to do your work, take care of things at home, or get along with other people? -       Fall Risk Screen:  JOANAADI Fall Risk Assessment was completed, and patient is at LOW risk for falls.Assessment completed on:8/22/2019    Health Habits and Functional and Cognitive Screening:  Functional & Cognitive Status 8/22/2019   Do you have difficulty preparing food and eating? No   Do you have difficulty bathing yourself, getting dressed or grooming yourself?  No   Do you have difficulty using the toilet? No   Do you have difficulty moving around from place to place? No   Do you have trouble with steps or getting out of a bed or a chair? No   Current Diet Well Balanced Diet   Dental Exam Up to date   Eye Exam Up to date   Exercise (times per week) 4 times per week   Current Exercise Activities Include Cardiovasular Workout on Exercise Equipment   Do you need help using the phone?  No   Are you deaf or do you have serious difficulty hearing?  No   Do you need help with transportation? No   Do you need help shopping? No   Do you need help preparing meals?  No   Do you need help with housework?  No   Do you need help with laundry? No   Do you need help taking your medications? No   Do you need help managing money? No   Do you ever drive or ride in a car without wearing a seat belt? No   Have you felt unusual stress, anger or loneliness in the last month? No   Who do you live with? Spouse   If you need help, do you have trouble finding someone available to you? No   Have you been bothered in the last four weeks by sexual problems? No   Do you have difficulty concentrating, remembering or making decisions? No         Does the patient have evidence of cognitive impairment? No    Asprin use counseling:Taking ASA appropriately as indicated    Age-appropriate Screening Schedule:  Refer to the list below for future screening recommendations based on patient's age, sex and/or medical conditions. Orders for these recommended tests are listed in the plan section. The patient has been provided with a written plan.    Health Maintenance   Topic Date Due   • INFLUENZA VACCINE  08/01/2019   • LIPID PANEL  05/20/2020   • DXA SCAN  06/20/2020   • MAMMOGRAM  12/28/2020   • TDAP/TD VACCINES (2 - Td) 05/17/2027   • PNEUMOCOCCAL VACCINES (65+ LOW/MEDIUM RISK)  Completed   • ZOSTER VACCINE  Discontinued          The following portions of the patient's history were reviewed and updated as  appropriate: allergies, current medications, past family history, past medical history, past social history, past surgical history and problem list.    Outpatient Medications Prior to Visit   Medication Sig Dispense Refill   • aspirin 81 MG tablet Take  by mouth.     • atorvastatin (LIPITOR) 40 MG tablet TAKE 1 TABLET DAILY EVERY EVENING FOR CHOLESTEROL 90 tablet 3   • Cholecalciferol (VITAMIN D3) 5000 UNITS capsule capsule 1 by mouth daily as directed 30 capsule    • Garlic 1000 MG capsule Take  by mouth.     • metoprolol succinate XL (TOPROL-XL) 25 MG 24 hr tablet TAKE 1 TABLET DAILY FOR BLOOD PRESSURE AND HEART AND PALPITATIONS 90 tablet 1   • Multiple Vitamin (MULTI VITAMIN DAILY PO) Take  by mouth daily.     • Multiple Vitamins-Minerals (PRESERVISION AREDS PO) Take  by mouth Every Night.     • timolol (TIMOPTIC) 0.5 % ophthalmic solution      • hydrochlorothiazide (HYDRODIURIL) 25 MG tablet TAKE 1 TABLET DAILY (NEED LABS AND FOLLOW UP AS SOON AS POSSIBLE) 90 tablet 1   • amLODIPine-benazepril (LOTREL) 5-40 MG per capsule TAKE 1 CAPSULE DAILY 90 capsule 1   • Mirabegron ER (MYRBETRIQ) 50 MG tablet sustained-release 24 hour 24 hr tablet Take 1 capsule daily for irritable bladder 30 tablet 3   • ciprofloxacin (CIPRO) 500 MG tablet Take 1 p.o. twice daily until gone 10 tablet 0   • TRAVATAN Z 0.004 % solution ophthalmic solution 1 drop Every Night.       No facility-administered medications prior to visit.        Patient Active Problem List   Diagnosis   • Benign essential hypertension   • Carotid artery plaque, 6/13/2019--mild bilateral.  05/30/2017--16-49% left.  Mild plaque right.  05/30/2014--mild bilateral carotid plaque.   • Diverticulosis of colon   • Heart palpitations   • Hyperlipidemia   • Irritable bowel syndrome   • Primary osteoarthritis of left knee   • Osteopenia, 06/20/2018--lumbar spine 0.4.  Right femoral neck -1.1.  Left femoral neck -0.8.  05/30/2014--lumbar spine 0.6.  Right femoral neck -1.2.   "Left femoral neck -1.7.   • Vitamin D deficiency   • Therapeutic drug monitoring   • Glaucoma, bilateral   • Menopausal state   • Irritable bladder   • Hypothyroidism   • Impaired fasting glucose   • Pancreatic lesion, 6/13/2019--dilated pancreatic side duct.  No mass.  No further follow-up.  4 mm, repeat study due June 2019   • Acute lower urinary tract infection       Advanced Care Planning:  Patient does not have an advance directive - information provided to the patient today    Review of Systems   HENT: Negative.    Respiratory: Negative.    Cardiovascular: Negative.    Gastrointestinal: Negative.    Endocrine: Negative.    Genitourinary: Negative.    Musculoskeletal: Negative.    Skin: Negative.    Allergic/Immunologic: Negative.    Neurological: Negative.    Hematological: Negative.    Psychiatric/Behavioral: Negative.        Compared to one year ago, the patient feels her physical health is the same.  Compared to one year ago, the patient feels her mental health is the same.    Reviewed chart for potential of high risk medication in the elderly: yes  Reviewed chart for potential of harmful drug interactions in the elderly:yes    Objective         Vitals:    08/22/19 1507   BP: 128/60   BP Location: Right arm   Pulse: 58   SpO2: 97%   Weight: 69.5 kg (153 lb 3.2 oz)   Height: 157.5 cm (62.01\")       Body mass index is 28.01 kg/m².  Discussed the patient's BMI with her. The BMI is above average; BMI management plan is completed.    Physical Exam    General: Alert and oriented x 3.  No acute distress.  Normal affect.  HEENT: Pupils equal, round, reactive to light; extraocular movements intact; sclerae nonicteric; pharynx, ear canals and TMs normal.  Neck: Without JVD, thyromegaly, bruit, or adenopathy.  Lungs: Clear to auscultation in all fields.  Heart: Regular rate and rhythm without murmur, rub, gallop, or click.  Abdomen: Soft, nontender, without hepatosplenomegaly or hernia.  Bowel sounds normal.  : " Deferred.  Rectal: Deferred.  Extremities: Without clubbing, cyanosis, edema, or pulse deficit.  Neurologic: Intact without focal deficit.  Normal station and gait observed during ingress and egress from the examination room.  Skin: Without significant lesion.  Musculoskeletal: Unremarkable.      Assessment/Plan   Medicare Risks and Personalized Health Plan  CMS Preventative Services Quick Reference  Advance Directive Discussion  Cardiovascular risk  Diabetic Lab Screening   Fall Risk  Obesity/Overweight   Osteoprorosis Risk    The above risks/problems have been discussed with the patient.  Pertinent information has been shared with the patient in the After Visit Summary.  Follow up plans and orders are seen below in the Assessment/Plan Section.    Diagnoses and all orders for this visit:    1. Medicare annual wellness visit, subsequent (Primary)    2. Acute lower urinary tract infection    3. Pancreatic lesion, 4 mm, repeat study due June 2019    4. Carotid artery plaque, 05/30/2017--16-49% left.  Mild plaque right.  05/30/2014--mild bilateral carotid plaque.    5. Benign essential hypertension  -     hydrochlorothiazide (HYDRODIURIL) 25 MG tablet; Take 1 p.o. daily for high blood pressure  Dispense: 90 tablet; Refill: 1    6. Impaired fasting glucose  -     Comprehensive Metabolic Panel; Future  -     Hemoglobin A1c; Future    7. Hyperlipidemia, unspecified hyperlipidemia type  -     CK; Future  -     Comprehensive Metabolic Panel; Future  -     NMR LipoProfile; Future    8. Vitamin D deficiency  -     Vitamin D 25 Hydroxy; Future    9. Therapeutic drug monitoring  -     CBC (No Diff); Future    10. Hypothyroidism, unspecified type  -     TSH; Future  -     T4, Free; Future  -     T3, Free; Future      Follow Up:  Return in about 6 months (around 2/22/2020) for Next scheduled follow up with lab prior.     An After Visit Summary and PPPS were given to the patient.

## 2019-08-22 NOTE — PROGRESS NOTES
08/22/2019    Patient Information  Stephanie Hawkins                                                                                          [address]  [place] [address]      12/16/1932  [unfilled]  There is no work phone number on file.    Chief Complaint:      Subsequent Medicare wellness visit.  Follow-up recent acute lower urinary tract infection, CT scan to evaluate pancreatic lesion, carotid artery plaque and recent carotid Doppler study, hypertension, impaired fasting glucose, hyperlipidemia.  No new acute complaints.    History of Present Illness:    Patient with a history of medical problems as outlined in the chief complaint presents today for subsequent Medicare wellness visit.  Patient also recently was evaluated for an acute urinary tract infection which we treated with Cipro and patient reports her symptoms have resolved and she is much better.  Also patient has a questionable pancreatic mass and she had a CT scan that we will review.  Patient also has carotid artery plaque and we will review a carotid Doppler study that was recently done.  Past medical history reviewed and updated were necessary including health maintenance parameters.  This reveals she will be up-to-date or else accounted for after today's visit.    Review of Systems   Constitution: Negative.   HENT: Negative.    Eyes: Negative.    Cardiovascular: Negative.    Respiratory: Negative.    Endocrine: Negative.    Hematologic/Lymphatic: Negative.    Skin: Negative.    Musculoskeletal: Negative.    Gastrointestinal: Negative.    Genitourinary: Negative.    Neurological: Negative.    Psychiatric/Behavioral: Negative.    Allergic/Immunologic: Negative.        Active Problems:    Patient Active Problem List   Diagnosis   • Benign essential hypertension   • Carotid artery plaque, 6/13/2019--mild bilateral.  05/30/2017--16-49% left.  Mild plaque right.  05/30/2014--mild bilateral carotid plaque.   • Diverticulosis of colon    • Heart palpitations   • Hyperlipidemia   • Irritable bowel syndrome   • Primary osteoarthritis of left knee   • Osteopenia, 06/20/2018--lumbar spine 0.4.  Right femoral neck -1.1.  Left femoral neck -0.8.  05/30/2014--lumbar spine 0.6.  Right femoral neck -1.2.  Left femoral neck -1.7.   • Vitamin D deficiency   • Therapeutic drug monitoring   • Glaucoma, bilateral   • Menopausal state   • Irritable bladder   • Hypothyroidism   • Impaired fasting glucose   • Pancreatic lesion, 6/13/2019--dilated pancreatic side duct.  No mass.  No further follow-up.  4 mm, repeat study due June 2019   • Acute lower urinary tract infection         Past Medical History:   Diagnosis Date   • Benign essential hypertension 2/26/2001 02/26/2001--patient was seen as a new patient. She was ordered being treated for hypertension at that time.   • Carotid artery plaque, 6/13/2019--mild bilateral.  05/30/2017--16-49% left.  Mild plaque right.  05/30/2014--mild bilateral carotid plaque. 1/8/2002 June 13, 2019--carotid Doppler study reveals mild bilateral carotid plaque.  No percentage numbers given.  05/30/2017--carotid Doppler study reveals right plaque without stenosis.  There is 16-49% stenosis of the left ICA.  05/30/2014--vascular screen reveals mild bilateral carotid plaque, negative for AAA, negative for PAD.   06/19/2012--vascular screen revealed bilateral, less than 50% internal    • Diverticulosis of colon 10/16/2001    11/03/2006--the entire examined colon was normal except for scattered diverticuli.   10/16/2001--colonoscopy revealed a few left-sided scattered diverticuli.   • Glaucoma, bilateral 6/1/2016 06/01/2016--routine follow-up.  She reports she was diagnosed with glaucoma about a month or so ago treated with Travatan eyedrops.   • Heart palpitations 10/13/2008    Patient has at a long history of heart palpitations.   10/13/2008--24-hour Holter monitor reveals an average heart rate of 69, a minimum heart rate was  55. Maximum heart rate was 98. There were no pauses. Ventricular ectopy was zero. Supraventricular ectopy was 5585, with 15 supraventricular runs. Supraventricular bigeminy events were 6 and supraventricular trigeminy events were one. There was no atrial fibrillation/flutter. No ST segment changes. Patient's heart palpitations related to frequent PACs which have been controlled with atenolol.   • Hyperlipidemia 2/26/2001 02/26/2001--patient was seen as a new patient. She was already on cholesterol medication at that time.   • Hypothyroidism 12/5/2017    May 28, 2019--routine follow-up.  TSH slightly elevated at 4.56.  Free T3 and free T4 are normal.  Continued monitoring.  12/05/2017--routine follow-up.  TSH elevated at 5.19.  Free T3 and free T4 are normal.  Repeat thyroid function tests ordered.  Thyroid antibodies ordered.  If these confirm diagnosis of hypothyroidism and we will proceed with thyroid ultrasound.   • Impaired fasting glucose 6/14/2018 06/14/2018--routine follow-up.  Fasting serum glucose mildly elevated at 102.  Recommend low carbohydrate diet and weight loss.  Exercise.   • Irritable bladder 8/29/2017 08/29/2017--patient seen in follow-up after recent urinary tract infection with complaints of nocturia ×4-5 per night as well as frequency and urgency.  Repeat urinalysis is negative.  Urine culture sent.  Given the fact the patient is had the urinary symptoms prior to the onset of the recent UTI, I will treat her empirically for irritable bladder.  Myrbetriq 25 mg per day.  May increase to 50 mg per day if necessary.  I will have her follow-up in about 3 weeks to reassess his situation.   • Irritable bowel syndrome 4/27/2016   • Menopausal state 5/17/2017   • Osteopenia, 06/20/2018--lumbar spine 0.4.  Right femoral neck -1.1.  Left femoral neck -0.8.  05/30/2014--lumbar spine 0.6.  Right femoral neck -1.2.  Left femoral neck -1.7. 6/9/2006 06/20/2018--DEXA scan reveals average lumbar T  "score 0.4.  Right femoral neck T score -1.1.  Left femoral neck T score -0.8.  Assessment is osteopenia and compared to previous study in 2016 there is been no change in the right femoral neck and 17.2% improvement in the left femoral neck and a 2.1% decrease in the lumbar spine.  05/30/2014--DEXA scan reveals average lumbar spine T score of 0.6. Right femoral neck T score -1.2. Left femoral neck T score -1.7. Osteopenia. Compared to the previous examination of 11/09/2010, there is a 1% decrease in the left hip bone mineral density, 2.1% decrease in the right hip bone density, and a 1.6% improvement in the lumbar spine bone density.   11/22/2010--treatment for osteopenia begun with Fosamax but patient discontinued it on her own.   11/09/2010--DEXA scan revealed average lumbar spine T score 0.5. Left femoral neck T score -1.4. Right femoral neck T score -1.1. Osteopenia.   06/09/2006--DEXA scan reveals lumbar spine T score 0.6. Left hip T score -0.8, left    • Pancreatic lesion, 4 mm, repeat study due June 2019 6/27/2018 11/27/2018--patient seen in follow-up and reports she is having urinary frequency without dysuria, but no incontinence and occasionally has some urgency.  She has no pain at the present time.  Apparently she saw a gynecologist and he did not examine her because \"we do not do Pap smears on a woman year age\".  06/27/2018--patient seen in follow-up and the results of the CT scan discussed.  She lu   • Possible Pelvic congestion syndrome 6/27/2018 11/27/2018--patient seen in follow-up and reports she is having urinary frequency without dysuria, but no incontinence and occasionally has some urgency.  She has no pain at the present time.  Apparently she saw a gynecologist and he did not examine her because \"we do not do Pap smears on a woman year age\".  06/27/2018--patient seen in follow-up and the results of the CT scan discussed.  She lu   • Primary osteoarthritis of left knee 3/23/2004    " 03/23/2004--patient was evaluated by the orthopedist for left knee pain. X-rays revealed osteoarthritis with some medial joint space narrowing, subchondral sclerosis, and patellofemoral spurs. Cortisone injections were given.   • Vitamin D deficiency 4/27/2016         Past Surgical History:   Procedure Laterality Date   • CATARACT EXTRACTION Right 01/2014 January 2014--cataract extirpation and intraocular lens implantation right eye.    • CATARACT EXTRACTION Left 09/2010 September 2010--cataract extirpation and intraocular lens implantation in the left eye.   • COLONOSCOPY  11/03/2006 11/03/2006--the entire examined colon was normal except for scattered diverticuli.    • COLONOSCOPY  10/16/2001    10/16/2001--colonoscopy revealed a few left-sided scattered diverticuli.         Allergies   Allergen Reactions   • Neomycin-Bacitracin Zn-Polymyx Swelling     Neosporin   • Sulfa Antibiotics Hives           Current Outpatient Medications:   •  aspirin 81 MG tablet, Take  by mouth., Disp: , Rfl:   •  atorvastatin (LIPITOR) 40 MG tablet, TAKE 1 TABLET DAILY EVERY EVENING FOR CHOLESTEROL, Disp: 90 tablet, Rfl: 3  •  Cholecalciferol (VITAMIN D3) 5000 UNITS capsule capsule, 1 by mouth daily as directed, Disp: 30 capsule, Rfl:   •  Garlic 1000 MG capsule, Take  by mouth., Disp: , Rfl:   •  hydrochlorothiazide (HYDRODIURIL) 25 MG tablet, Take 1 p.o. daily for high blood pressure, Disp: 90 tablet, Rfl: 1  •  metoprolol succinate XL (TOPROL-XL) 25 MG 24 hr tablet, TAKE 1 TABLET DAILY FOR BLOOD PRESSURE AND HEART AND PALPITATIONS, Disp: 90 tablet, Rfl: 1  •  Multiple Vitamin (MULTI VITAMIN DAILY PO), Take  by mouth daily., Disp: , Rfl:   •  Multiple Vitamins-Minerals (PRESERVISION AREDS PO), Take  by mouth Every Night., Disp: , Rfl:   •  timolol (TIMOPTIC) 0.5 % ophthalmic solution, , Disp: , Rfl:   •  amLODIPine-benazepril (LOTREL) 5-40 MG per capsule, TAKE 1 CAPSULE DAILY, Disp: 90 capsule, Rfl: 1  •  Mirabegron ER  "(MYRBETRIQ) 50 MG tablet sustained-release 24 hour 24 hr tablet, Take 1 capsule daily for irritable bladder, Disp: 30 tablet, Rfl: 3      Family History   Problem Relation Age of Onset   • Cirrhosis Mother         Of Liver. Mother  at age 62 from alcoholic cirrhosis.   • Heart attack Father         Acute Myocardial Infarction.  Father  of a myocardial infarction at age 68.         Social History     Socioeconomic History   • Marital status:      Spouse name: Az   • Number of children: 3   • Years of education: Not on file   • Highest education level: Bachelor's degree (e.g., BA, AB, BS)   Occupational History   • Occupation: Retired () [employer]   Social Needs   • Financial resource strain: Not hard at all   • Food insecurity:     Worry: Never true     Inability: Never true   • Transportation needs:     Medical: No     Non-medical: No   Tobacco Use   • Smoking status: Never Smoker   • Smokeless tobacco: Never Used   Substance and Sexual Activity   • Alcohol use: No   • Drug use: No   • Sexual activity: Not Currently     Partners: Male   Lifestyle   • Physical activity:     Days per week: 4 days     Minutes per session: 60 min   • Stress: Only a little   Relationships   • Social connections:     Talks on phone: Three times a week     Gets together: Once a week     Attends Sikhism service: More than 4 times per year     Active member of club or organization: Yes     Attends meetings of clubs or organizations: More than 4 times per year     Relationship status:          Vitals:    19 1507   BP: 128/60   BP Location: Right arm   Pulse: 58   SpO2: 97%   Weight: 69.5 kg (153 lb 3.2 oz)   Height: 157.5 cm (62.01\")          Physical Exam:    General: Alert and oriented x 3.  No acute distress.  Normal affect.  HEENT: Pupils equal, round, reactive to light; extraocular movements intact; sclerae nonicteric; pharynx, ear canals and TMs normal.  Neck: Without JVD, " thyromegaly, bruit, or adenopathy.  Lungs: Clear to auscultation in all fields.  Heart: Regular rate and rhythm without murmur, rub, gallop, or click.  Abdomen: Soft, nontender, without hepatosplenomegaly or hernia.  Bowel sounds normal.  : Deferred.  Rectal: Deferred.  Extremities: Without clubbing, cyanosis, edema, or pulse deficit.  Neurologic: Intact without focal deficit.  Normal station and gait observed during ingress and egress from the examination room.  Skin: Without significant lesion.  Musculoskeletal: Unremarkable.    Lab/other results:    I reviewed the results of the recent CT scan of the abdomen pelvis which reveals no evidence of pancreatic mass.  It is felt that this is a dilated pancreatic side.and needs no further CT follow-up.  Also reviewed the carotid Doppler study which reveals mild bilateral carotid plaque that is hemodynamically insignificant.    Assessment/Plan:     Diagnosis Plan   1. Medicare annual wellness visit, subsequent     2. Acute lower urinary tract infection     3. Pancreatic lesion, 4 mm, repeat study due June 2019     4. Carotid artery plaque, 05/30/2017--16-49% left.  Mild plaque right.  05/30/2014--mild bilateral carotid plaque.     5. Benign essential hypertension  hydrochlorothiazide (HYDRODIURIL) 25 MG tablet   6. Impaired fasting glucose     7. Hyperlipidemia, unspecified hyperlipidemia type     8. Vitamin D deficiency       The subsequent Medicare wellness visit is documented on separate note.    Patient is urinary tract symptoms have resolved.  The pancreatic lesion is a mildly dilated pancreatic side and the radiologist feels that no further follow-up as needed.  Reassurance given.  It appears her carotid artery plaque may actually have improved and there is certainly no progression.  Patient's blood pressure appears to be well controlled.  She has very mild impaired fasting glucose that does not require medication.  Her cholesterol is been under good control and  her vitamin D has been therapeutic which is important given her osteopenia.  She is up-to-date on her DEXA scan.  She has hypothyroidism which has been subclinical and we are monitoring.    Plan is as follows: No change in current medical regimen.  Patient will follow-up in 6 months with lab prior or follow-up as needed.    Procedures

## 2019-10-07 RX ORDER — AMLODIPINE BESYLATE AND BENAZEPRIL HYDROCHLORIDE 5; 40 MG/1; MG/1
CAPSULE ORAL
Qty: 90 CAPSULE | Refills: 4 | Status: SHIPPED | OUTPATIENT
Start: 2019-10-07 | End: 2020-10-08 | Stop reason: SDUPTHER

## 2019-10-14 ENCOUNTER — CLINICAL SUPPORT (OUTPATIENT)
Dept: INTERNAL MEDICINE | Facility: CLINIC | Age: 84
End: 2019-10-14

## 2019-10-14 DIAGNOSIS — Z23 NEED FOR INFLUENZA VACCINATION: Primary | ICD-10-CM

## 2019-10-14 PROCEDURE — 90653 IIV ADJUVANT VACCINE IM: CPT | Performed by: INTERNAL MEDICINE

## 2019-10-14 PROCEDURE — G0008 ADMIN INFLUENZA VIRUS VAC: HCPCS | Performed by: INTERNAL MEDICINE

## 2019-10-31 ENCOUNTER — TELEPHONE (OUTPATIENT)
Dept: INTERNAL MEDICINE | Facility: CLINIC | Age: 84
End: 2019-10-31

## 2019-10-31 DIAGNOSIS — N39.0 ACUTE LOWER URINARY TRACT INFECTION: Primary | ICD-10-CM

## 2019-10-31 RX ORDER — CEFDINIR 300 MG/1
CAPSULE ORAL
Qty: 10 CAPSULE | Refills: 0 | Status: SHIPPED | OUTPATIENT
Start: 2019-10-31 | End: 2020-01-10

## 2020-01-09 DIAGNOSIS — E78.5 HYPERLIPIDEMIA, UNSPECIFIED HYPERLIPIDEMIA TYPE: Chronic | ICD-10-CM

## 2020-01-09 RX ORDER — ATORVASTATIN CALCIUM 40 MG/1
TABLET, FILM COATED ORAL
Qty: 90 TABLET | Refills: 4 | Status: SHIPPED | OUTPATIENT
Start: 2020-01-09 | End: 2020-10-08 | Stop reason: SDUPTHER

## 2020-01-10 ENCOUNTER — OFFICE VISIT (OUTPATIENT)
Dept: INTERNAL MEDICINE | Facility: CLINIC | Age: 85
End: 2020-01-10

## 2020-01-10 VITALS
DIASTOLIC BLOOD PRESSURE: 54 MMHG | OXYGEN SATURATION: 97 % | SYSTOLIC BLOOD PRESSURE: 126 MMHG | WEIGHT: 152.3 LBS | BODY MASS INDEX: 28.03 KG/M2 | HEIGHT: 62 IN | HEART RATE: 73 BPM

## 2020-01-10 DIAGNOSIS — N39.0 ACUTE LOWER URINARY TRACT INFECTION: Primary | ICD-10-CM

## 2020-01-10 DIAGNOSIS — Z23 NEED FOR TDAP VACCINATION: ICD-10-CM

## 2020-01-10 LAB
BILIRUB BLD-MCNC: NEGATIVE MG/DL
CLARITY, POC: CLEAR
COLOR UR: YELLOW
GLUCOSE UR STRIP-MCNC: NEGATIVE MG/DL
KETONES UR QL: NEGATIVE
LEUKOCYTE EST, POC: ABNORMAL
NITRITE UR-MCNC: NEGATIVE MG/ML
PH UR: 6 [PH] (ref 5–8)
PROT UR STRIP-MCNC: NEGATIVE MG/DL
RBC # UR STRIP: ABNORMAL /UL
SP GR UR: 1.01 (ref 1–1.03)
UROBILINOGEN UR QL: ABNORMAL

## 2020-01-10 PROCEDURE — 90715 TDAP VACCINE 7 YRS/> IM: CPT | Performed by: INTERNAL MEDICINE

## 2020-01-10 PROCEDURE — 99213 OFFICE O/P EST LOW 20 MIN: CPT | Performed by: INTERNAL MEDICINE

## 2020-01-10 PROCEDURE — 81003 URINALYSIS AUTO W/O SCOPE: CPT | Performed by: INTERNAL MEDICINE

## 2020-01-10 PROCEDURE — 90471 IMMUNIZATION ADMIN: CPT | Performed by: INTERNAL MEDICINE

## 2020-01-10 RX ORDER — CIPROFLOXACIN 500 MG/1
TABLET, FILM COATED ORAL
Qty: 10 TABLET | Refills: 0 | Status: SHIPPED | OUTPATIENT
Start: 2020-01-10 | End: 2020-06-25

## 2020-01-10 NOTE — PROGRESS NOTES
"             01/10/2020    Patient Information  Stephanie Hawkins                                                                                          [address]  [facility] [place] [address]      12/16/1932  [unfilled]  There is no work phone number on file.    Chief Complaint:     \"I think I have a urinary tract infection\".    History of Present Illness:    Patient with a history of recurrent urinary tract infections and a history of possible pelvic congestion syndrome presents today with complaints consistent with a urinary tract infection as described below.  Her past medical history reviewed and updated were necessary including health maintenance parameters.  This reveals she is up-to-date or else accounted for.    The history regarding acute lower urinary tract infection:    January 10, 2020--patient presents with a 5-day history of urinary frequency, urgency, and burning with urination without fever, chills, or other systemic signs or symptoms.  Automated urinalysis reveals 1+ blood, 3+ leukocytes, nitrite negative.  Urine culture sent.  Cipro 500 mg p.o. twice daily x 5 days.    Review of Systems   Constitution: Negative.   HENT: Negative.    Eyes: Negative.    Cardiovascular: Negative.    Respiratory: Negative.    Endocrine: Negative.    Hematologic/Lymphatic: Negative.    Skin: Negative.    Musculoskeletal: Negative.    Gastrointestinal: Negative.    Genitourinary: Positive for dysuria, frequency and urgency.   Neurological: Negative.    Psychiatric/Behavioral: Negative.    Allergic/Immunologic: Negative.        Active Problems:    Patient Active Problem List   Diagnosis   • Benign essential hypertension   • Carotid artery plaque, 6/13/2019--mild bilateral.  05/30/2017--16-49% left.  Mild plaque right.  05/30/2014--mild bilateral carotid plaque.   • Diverticulosis of colon   • Heart palpitations   • Hyperlipidemia   • Irritable bowel syndrome   • Primary osteoarthritis of left knee   • Osteopenia, " 06/20/2018--lumbar spine 0.4.  Right femoral neck -1.1.  Left femoral neck -0.8.  05/30/2014--lumbar spine 0.6.  Right femoral neck -1.2.  Left femoral neck -1.7.   • Vitamin D deficiency   • Therapeutic drug monitoring   • Glaucoma, bilateral   • Menopausal state   • Irritable bladder   • Hypothyroidism   • Impaired fasting glucose   • Pancreatic lesion, 6/13/2019--dilated pancreatic side duct.  No mass.  No further follow-up.  4 mm, repeat study due June 2019   • Acute lower urinary tract infection         Past Medical History:   Diagnosis Date   • Benign essential hypertension 2/26/2001 02/26/2001--patient was seen as a new patient. She was ordered being treated for hypertension at that time.   • Carotid artery plaque, 6/13/2019--mild bilateral.  05/30/2017--16-49% left.  Mild plaque right.  05/30/2014--mild bilateral carotid plaque. 1/8/2002 June 13, 2019--carotid Doppler study reveals mild bilateral carotid plaque.  No percentage numbers given.  05/30/2017--carotid Doppler study reveals right plaque without stenosis.  There is 16-49% stenosis of the left ICA.  05/30/2014--vascular screen reveals mild bilateral carotid plaque, negative for AAA, negative for PAD.   06/19/2012--vascular screen revealed bilateral, less than 50% internal    • Diverticulosis of colon 10/16/2001    11/03/2006--the entire examined colon was normal except for scattered diverticuli.   10/16/2001--colonoscopy revealed a few left-sided scattered diverticuli.   • Glaucoma, bilateral 6/1/2016 06/01/2016--routine follow-up.  She reports she was diagnosed with glaucoma about a month or so ago treated with Travatan eyedrops.   • Heart palpitations 10/13/2008    Patient has at a long history of heart palpitations.   10/13/2008--24-hour Holter monitor reveals an average heart rate of 69, a minimum heart rate was 55. Maximum heart rate was 98. There were no pauses. Ventricular ectopy was zero. Supraventricular ectopy was 5585, with 15  supraventricular runs. Supraventricular bigeminy events were 6 and supraventricular trigeminy events were one. There was no atrial fibrillation/flutter. No ST segment changes. Patient's heart palpitations related to frequent PACs which have been controlled with atenolol.   • Hyperlipidemia 2/26/2001 02/26/2001--patient was seen as a new patient. She was already on cholesterol medication at that time.   • Hypothyroidism 12/5/2017    May 28, 2019--routine follow-up.  TSH slightly elevated at 4.56.  Free T3 and free T4 are normal.  Continued monitoring.  12/05/2017--routine follow-up.  TSH elevated at 5.19.  Free T3 and free T4 are normal.  Repeat thyroid function tests ordered.  Thyroid antibodies ordered.  If these confirm diagnosis of hypothyroidism and we will proceed with thyroid ultrasound.   • Impaired fasting glucose 6/14/2018 06/14/2018--routine follow-up.  Fasting serum glucose mildly elevated at 102.  Recommend low carbohydrate diet and weight loss.  Exercise.   • Irritable bladder 8/29/2017 08/29/2017--patient seen in follow-up after recent urinary tract infection with complaints of nocturia ×4-5 per night as well as frequency and urgency.  Repeat urinalysis is negative.  Urine culture sent.  Given the fact the patient is had the urinary symptoms prior to the onset of the recent UTI, I will treat her empirically for irritable bladder.  Myrbetriq 25 mg per day.  May increase to 50 mg per day if necessary.  I will have her follow-up in about 3 weeks to reassess his situation.   • Irritable bowel syndrome 4/27/2016   • Menopausal state 5/17/2017   • Osteopenia, 06/20/2018--lumbar spine 0.4.  Right femoral neck -1.1.  Left femoral neck -0.8.  05/30/2014--lumbar spine 0.6.  Right femoral neck -1.2.  Left femoral neck -1.7. 6/9/2006 06/20/2018--DEXA scan reveals average lumbar T score 0.4.  Right femoral neck T score -1.1.  Left femoral neck T score -0.8.  Assessment is osteopenia and compared to  "previous study in 2016 there is been no change in the right femoral neck and 17.2% improvement in the left femoral neck and a 2.1% decrease in the lumbar spine.  05/30/2014--DEXA scan reveals average lumbar spine T score of 0.6. Right femoral neck T score -1.2. Left femoral neck T score -1.7. Osteopenia. Compared to the previous examination of 11/09/2010, there is a 1% decrease in the left hip bone mineral density, 2.1% decrease in the right hip bone density, and a 1.6% improvement in the lumbar spine bone density.   11/22/2010--treatment for osteopenia begun with Fosamax but patient discontinued it on her own.   11/09/2010--DEXA scan revealed average lumbar spine T score 0.5. Left femoral neck T score -1.4. Right femoral neck T score -1.1. Osteopenia.   06/09/2006--DEXA scan reveals lumbar spine T score 0.6. Left hip T score -0.8, left    • Pancreatic lesion, 4 mm, repeat study due June 2019 6/27/2018 11/27/2018--patient seen in follow-up and reports she is having urinary frequency without dysuria, but no incontinence and occasionally has some urgency.  She has no pain at the present time.  Apparently she saw a gynecologist and he did not examine her because \"we do not do Pap smears on a woman year age\".  06/27/2018--patient seen in follow-up and the results of the CT scan discussed.  Helga leigh   • Possible Pelvic congestion syndrome 6/27/2018 11/27/2018--patient seen in follow-up and reports she is having urinary frequency without dysuria, but no incontinence and occasionally has some urgency.  She has no pain at the present time.  Apparently she saw a gynecologist and he did not examine her because \"we do not do Pap smears on a woman year age\".  06/27/2018--patient seen in follow-up and the results of the CT scan discussed.  Helga leigh   • Primary osteoarthritis of left knee 3/23/2004    03/23/2004--patient was evaluated by the orthopedist for left knee pain. X-rays revealed osteoarthritis with some medial " joint space narrowing, subchondral sclerosis, and patellofemoral spurs. Cortisone injections were given.   • Vitamin D deficiency 4/27/2016         Past Surgical History:   Procedure Laterality Date   • CATARACT EXTRACTION Right 01/2014 January 2014--cataract extirpation and intraocular lens implantation right eye.    • CATARACT EXTRACTION Left 09/2010 September 2010--cataract extirpation and intraocular lens implantation in the left eye.   • COLONOSCOPY  11/03/2006 11/03/2006--the entire examined colon was normal except for scattered diverticuli.    • COLONOSCOPY  10/16/2001    10/16/2001--colonoscopy revealed a few left-sided scattered diverticuli.         Allergies   Allergen Reactions   • Neomycin-Bacitracin Zn-Polymyx Swelling     Neosporin   • Sulfa Antibiotics Hives           Current Outpatient Medications:   •  amLODIPine-benazepril (LOTREL) 5-40 MG per capsule, TAKE 1 CAPSULE DAILY, Disp: 90 capsule, Rfl: 4  •  aspirin 81 MG tablet, Take  by mouth., Disp: , Rfl:   •  atorvastatin (LIPITOR) 40 MG tablet, TAKE 1 TABLET DAILY EVERY EVENING FOR CHOLESTEROL, Disp: 90 tablet, Rfl: 4  •  Cholecalciferol (VITAMIN D3) 5000 UNITS capsule capsule, 1 by mouth daily as directed, Disp: 30 capsule, Rfl:   •  Garlic 1000 MG capsule, Take  by mouth., Disp: , Rfl:   •  hydrochlorothiazide (HYDRODIURIL) 25 MG tablet, Take 1 p.o. daily for high blood pressure, Disp: 90 tablet, Rfl: 1  •  metoprolol succinate XL (TOPROL-XL) 25 MG 24 hr tablet, TAKE 1 TABLET DAILY FOR BLOOD PRESSURE AND HEART AND PALPITATIONS, Disp: 90 tablet, Rfl: 1  •  Mirabegron ER (MYRBETRIQ) 50 MG tablet sustained-release 24 hour 24 hr tablet, Take 1 capsule daily for irritable bladder, Disp: 30 tablet, Rfl: 3  •  Multiple Vitamin (MULTI VITAMIN DAILY PO), Take  by mouth daily., Disp: , Rfl:   •  Multiple Vitamins-Minerals (PRESERVISION AREDS PO), Take  by mouth Every Night., Disp: , Rfl:   •  timolol (TIMOPTIC) 0.5 % ophthalmic solution, , Disp: ,  "Rfl:       Family History   Problem Relation Age of Onset   • Cirrhosis Mother         Of Liver. Mother  at age 62 from alcoholic cirrhosis.   • Heart attack Father         Acute Myocardial Infarction.  Father  of a myocardial infarction at age 68.         Social History     Socioeconomic History   • Marital status:      Spouse name: Az   • Number of children: 3   • Years of education: Not on file   • Highest education level: Bachelor's degree (e.g., BA, AB, BS)   Occupational History   • Occupation: Retired () [employer]   Social Needs   • Financial resource strain: Not hard at all   • Food insecurity:     Worry: Never true     Inability: Never true   • Transportation needs:     Medical: No     Non-medical: No   Tobacco Use   • Smoking status: Never Smoker   • Smokeless tobacco: Never Used   Substance and Sexual Activity   • Alcohol use: No   • Drug use: No   • Sexual activity: Not Currently     Partners: Male   Lifestyle   • Physical activity:     Days per week: 4 days     Minutes per session: 60 min   • Stress: To some extent   Relationships   • Social connections:     Talks on phone: Three times a week     Gets together: Once a week     Attends [employer] service: More than 4 times per year     Active member of club or organization: Yes     Attends meetings of clubs or organizations: More than 4 times per year     Relationship status:          Vitals:    01/10/20 0755   BP: 126/54   BP Location: Right arm   Pulse: 73   SpO2: 97%   Weight: 69.1 kg (152 lb 4.8 oz)   Height: 157.5 cm (62.01\")        Body mass index is 27.85 kg/m².      Physical Exam:    Brief physical exam is unremarkable.  There is no CVA tenderness.  Abdomen is benign.    Lab/other results:    Automated urinalysis reveals 1+ blood, 3+ leukocytes, nitrite negative.    Assessment/Plan:     Diagnosis Plan   1. Acute lower urinary tract infection  POCT urinalysis dipstick, automated    Urine Culture " - , Urine, Clean Catch     January 10, 2020--patient presents with a 5-day history of urinary frequency, urgency, and burning with urination without fever, chills, or other systemic signs or symptoms.  Automated urinalysis reveals 1+ blood, 3+ leukocytes, nitrite negative.  Urine culture sent.  Cipro 500 mg p.o. twice daily x 5 days.    Plan is as follows: Cipro 500 mg p.o. twice daily x 5 days.  Tdap given per patient request.  Apparently her granddaughter is about to have a baby and they are recommending close family members have the Tdap.        Procedures

## 2020-01-12 LAB
BACTERIA UR CULT: NORMAL
BACTERIA UR CULT: NORMAL

## 2020-01-18 DIAGNOSIS — I10 BENIGN ESSENTIAL HYPERTENSION: Chronic | ICD-10-CM

## 2020-01-20 RX ORDER — HYDROCHLOROTHIAZIDE 25 MG/1
TABLET ORAL
Qty: 90 TABLET | Refills: 4 | Status: SHIPPED | OUTPATIENT
Start: 2020-01-20 | End: 2020-06-25 | Stop reason: SDUPTHER

## 2020-02-19 DIAGNOSIS — E78.5 HYPERLIPIDEMIA, UNSPECIFIED HYPERLIPIDEMIA TYPE: Chronic | ICD-10-CM

## 2020-02-19 DIAGNOSIS — E55.9 VITAMIN D DEFICIENCY: Chronic | ICD-10-CM

## 2020-02-19 DIAGNOSIS — Z51.81 THERAPEUTIC DRUG MONITORING: ICD-10-CM

## 2020-02-19 DIAGNOSIS — R73.01 IMPAIRED FASTING GLUCOSE: Chronic | ICD-10-CM

## 2020-02-19 DIAGNOSIS — E03.9 HYPOTHYROIDISM, UNSPECIFIED TYPE: Chronic | ICD-10-CM

## 2020-02-21 LAB
25(OH)D3+25(OH)D2 SERPL-MCNC: 43.9 NG/ML (ref 30–100)
ALBUMIN SERPL-MCNC: 4.2 G/DL (ref 3.5–5.2)
ALBUMIN/GLOB SERPL: 1.5 G/DL
ALP SERPL-CCNC: 88 U/L (ref 39–117)
ALT SERPL-CCNC: 24 U/L (ref 1–33)
AST SERPL-CCNC: 21 U/L (ref 1–32)
BILIRUB SERPL-MCNC: 0.6 MG/DL (ref 0.2–1.2)
BUN SERPL-MCNC: 18 MG/DL (ref 8–23)
BUN/CREAT SERPL: 21.4 (ref 7–25)
CALCIUM SERPL-MCNC: 9.6 MG/DL (ref 8.6–10.5)
CHLORIDE SERPL-SCNC: 98 MMOL/L (ref 98–107)
CHOLEST SERPL-MCNC: 170 MG/DL (ref 100–199)
CK SERPL-CCNC: 53 U/L (ref 20–180)
CO2 SERPL-SCNC: 29.1 MMOL/L (ref 22–29)
CREAT SERPL-MCNC: 0.84 MG/DL (ref 0.57–1)
ERYTHROCYTE [DISTWIDTH] IN BLOOD BY AUTOMATED COUNT: 12.1 % (ref 12.3–15.4)
GLOBULIN SER CALC-MCNC: 2.8 GM/DL
GLUCOSE SERPL-MCNC: 104 MG/DL (ref 65–99)
HBA1C MFR BLD: 6.6 % (ref 4.8–5.6)
HCT VFR BLD AUTO: 41 % (ref 34–46.6)
HDL SERPL-SCNC: 42.1 UMOL/L
HDLC SERPL-MCNC: 63 MG/DL
HGB BLD-MCNC: 14 G/DL (ref 12–15.9)
LDL SERPL QN: 21.1 NM
LDL SERPL-SCNC: 969 NMOL/L
LDL SMALL SERPL-SCNC: 424 NMOL/L
LDLC SERPL CALC-MCNC: 84 MG/DL (ref 0–99)
MCH RBC QN AUTO: 32.3 PG (ref 26.6–33)
MCHC RBC AUTO-ENTMCNC: 34.1 G/DL (ref 31.5–35.7)
MCV RBC AUTO: 94.5 FL (ref 79–97)
PLATELET # BLD AUTO: 286 10*3/MM3 (ref 140–450)
POTASSIUM SERPL-SCNC: 4.5 MMOL/L (ref 3.5–5.2)
PROT SERPL-MCNC: 7 G/DL (ref 6–8.5)
RBC # BLD AUTO: 4.34 10*6/MM3 (ref 3.77–5.28)
SODIUM SERPL-SCNC: 139 MMOL/L (ref 136–145)
T3FREE SERPL-MCNC: 3.4 PG/ML (ref 2–4.4)
T4 FREE SERPL-MCNC: 1.13 NG/DL (ref 0.93–1.7)
TRIGL SERPL-MCNC: 114 MG/DL (ref 0–149)
TSH SERPL DL<=0.005 MIU/L-ACNC: 4.35 UIU/ML (ref 0.27–4.2)
WBC # BLD AUTO: 5.89 10*3/MM3 (ref 3.4–10.8)

## 2020-06-25 ENCOUNTER — HOSPITAL ENCOUNTER (OUTPATIENT)
Dept: GENERAL RADIOLOGY | Facility: HOSPITAL | Age: 85
Discharge: HOME | End: 2020-06-25
Admitting: INTERNAL MEDICINE

## 2020-06-25 ENCOUNTER — OFFICE VISIT (OUTPATIENT)
Dept: INTERNAL MEDICINE | Facility: CLINIC | Age: 85
End: 2020-06-25

## 2020-06-25 VITALS
OXYGEN SATURATION: 97 % | SYSTOLIC BLOOD PRESSURE: 144 MMHG | DIASTOLIC BLOOD PRESSURE: 62 MMHG | WEIGHT: 156.8 LBS | BODY MASS INDEX: 28.85 KG/M2 | HEART RATE: 71 BPM | HEIGHT: 62 IN

## 2020-06-25 DIAGNOSIS — E03.8 SUBCLINICAL HYPOTHYROIDISM: ICD-10-CM

## 2020-06-25 DIAGNOSIS — R00.2 HEART PALPITATIONS: Chronic | ICD-10-CM

## 2020-06-25 DIAGNOSIS — M25.552 CHRONIC LEFT HIP PAIN: ICD-10-CM

## 2020-06-25 DIAGNOSIS — E55.9 VITAMIN D DEFICIENCY: Chronic | ICD-10-CM

## 2020-06-25 DIAGNOSIS — I10 BENIGN ESSENTIAL HYPERTENSION: Chronic | ICD-10-CM

## 2020-06-25 DIAGNOSIS — E78.2 MIXED HYPERLIPIDEMIA: Chronic | ICD-10-CM

## 2020-06-25 DIAGNOSIS — I65.23 ATHEROSCLEROSIS OF BOTH CAROTID ARTERIES: Chronic | ICD-10-CM

## 2020-06-25 DIAGNOSIS — K86.9 PANCREATIC LESION: Chronic | ICD-10-CM

## 2020-06-25 DIAGNOSIS — N32.89 IRRITABLE BLADDER: Chronic | ICD-10-CM

## 2020-06-25 DIAGNOSIS — G89.29 CHRONIC LEFT HIP PAIN: ICD-10-CM

## 2020-06-25 DIAGNOSIS — Z51.81 THERAPEUTIC DRUG MONITORING: ICD-10-CM

## 2020-06-25 DIAGNOSIS — M85.89 OSTEOPENIA OF MULTIPLE SITES: Chronic | ICD-10-CM

## 2020-06-25 DIAGNOSIS — R73.01 IMPAIRED FASTING GLUCOSE: Primary | Chronic | ICD-10-CM

## 2020-06-25 PROBLEM — N39.0 ACUTE LOWER URINARY TRACT INFECTION: Status: RESOLVED | Noted: 2019-08-13 | Resolved: 2020-06-25

## 2020-06-25 PROCEDURE — 73502 X-RAY EXAM HIP UNI 2-3 VIEWS: CPT

## 2020-06-25 PROCEDURE — 99214 OFFICE O/P EST MOD 30 MIN: CPT | Performed by: INTERNAL MEDICINE

## 2020-06-25 RX ORDER — HYDROCHLOROTHIAZIDE 25 MG/1
TABLET ORAL
Qty: 90 TABLET | Refills: 3 | Status: SHIPPED | OUTPATIENT
Start: 2020-06-25 | End: 2020-10-08 | Stop reason: SDUPTHER

## 2020-06-25 NOTE — PROGRESS NOTES
06/25/2020    Patient Information  Stephanie Hawkins                                                                                          [id number]  [facility] [address]      12/16/1932  [unfilled]  There is no work phone number on file.    Chief Complaint:     Follow-up lab work in order to monitor chronic medical issues.  No new acute complaints.    History of Present Illness:    Patient with a history of impaired fasting glucose, hyperlipidemia, hypertension, carotid artery plaque, subclinical hypothyroidism, heart palpitations, osteopenia, vitamin D deficiency, irritable bladder, pancreatic lesion.  She presents today to follow-up on lab work in order to monitor her chronic medical issues.  Her past medical history reviewed and updated were necessary including health maintenance parameters.  This reveals she needs a DEXA scan.    Review of Systems   Constitution: Negative.   HENT: Negative.    Eyes: Negative.    Cardiovascular: Negative.    Respiratory: Negative.    Endocrine: Negative.    Hematologic/Lymphatic: Negative.    Skin: Negative.    Musculoskeletal: Negative.    Gastrointestinal: Negative.    Genitourinary: Negative.    Neurological: Negative.    Psychiatric/Behavioral: Negative.    Allergic/Immunologic: Negative.        Active Problems:    Patient Active Problem List   Diagnosis   • Benign essential hypertension   • Carotid artery plaque, 6/13/2019--mild bilateral.  05/30/2017--16-49% left.  Mild plaque right.  05/30/2014--mild bilateral carotid plaque.   • Diverticulosis of colon   • Heart palpitations   • Hyperlipidemia   • Irritable bowel syndrome   • Primary osteoarthritis of left knee   • Osteopenia of multiple sites   • Vitamin D deficiency   • Therapeutic drug monitoring   • Glaucoma, bilateral   • Menopausal state   • Irritable bladder   • Subclinical hypothyroidism   • Impaired fasting glucose   • Pancreatic lesion, 6/13/2019--dilated pancreatic side duct.  No mass.  No  further follow-up.  4 mm, repeat study due June 2019   • Chronic left hip pain         Past Medical History:   Diagnosis Date   • Benign essential hypertension 2/26/2001 02/26/2001--patient was seen as a new patient. She was ordered being treated for hypertension at that time.   • Carotid artery plaque, 6/13/2019--mild bilateral.  05/30/2017--16-49% left.  Mild plaque right.  05/30/2014--mild bilateral carotid plaque. 1/8/2002 June 13, 2019--carotid Doppler study reveals mild bilateral carotid plaque.  No percentage numbers given.  05/30/2017--carotid Doppler study reveals right plaque without stenosis.  There is 16-49% stenosis of the left ICA.  05/30/2014--vascular screen reveals mild bilateral carotid plaque, negative for AAA, negative for PAD.   06/19/2012--vascular screen revealed bilateral, less than 50% internal    • Diverticulosis of colon 10/16/2001    11/03/2006--the entire examined colon was normal except for scattered diverticuli.   10/16/2001--colonoscopy revealed a few left-sided scattered diverticuli.   • Glaucoma, bilateral 6/1/2016 06/01/2016--routine follow-up.  She reports she was diagnosed with glaucoma about a month or so ago treated with Travatan eyedrops.   • Heart palpitations 10/13/2008    Patient has at a long history of heart palpitations.   10/13/2008--24-hour Holter monitor reveals an average heart rate of 69, a minimum heart rate was 55. Maximum heart rate was 98. There were no pauses. Ventricular ectopy was zero. Supraventricular ectopy was 5585, with 15 supraventricular runs. Supraventricular bigeminy events were 6 and supraventricular trigeminy events were one. There was no atrial fibrillation/flutter. No ST segment changes. Patient's heart palpitations related to frequent PACs which have been controlled with atenolol.   • Hyperlipidemia 2/26/2001 02/26/2001--patient was seen as a new patient. She was already on cholesterol medication at that time.   • Impaired fasting  glucose 6/14/2018 06/14/2018--routine follow-up.  Fasting serum glucose mildly elevated at 102.  Recommend low carbohydrate diet and weight loss.  Exercise.   • Irritable bladder 8/29/2017 08/29/2017--patient seen in follow-up after recent urinary tract infection with complaints of nocturia ×4-5 per night as well as frequency and urgency.  Repeat urinalysis is negative.  Urine culture sent.  Given the fact the patient is had the urinary symptoms prior to the onset of the recent UTI, I will treat her empirically for irritable bladder.  Myrbetriq 25 mg per day.  May increase to 50 mg per day if necessary.  I will have her follow-up in about 3 weeks to reassess his situation.   • Irritable bowel syndrome 4/27/2016   • Menopausal state 5/17/2017   • Osteopenia, 06/20/2018--lumbar spine 0.4.  Right femoral neck -1.1.  Left femoral neck -0.8.  05/30/2014--lumbar spine 0.6.  Right femoral neck -1.2.  Left femoral neck -1.7. 6/9/2006 06/20/2018--DEXA scan reveals average lumbar T score 0.4.  Right femoral neck T score -1.1.  Left femoral neck T score -0.8.  Assessment is osteopenia and compared to previous study in 2016 there is been no change in the right femoral neck and 17.2% improvement in the left femoral neck and a 2.1% decrease in the lumbar spine.  05/30/2014--DEXA scan reveals average lumbar spine T score of 0.6. Right femoral neck T score -1.2. Left femoral neck T score -1.7. Osteopenia. Compared to the previous examination of 11/09/2010, there is a 1% decrease in the left hip bone mineral density, 2.1% decrease in the right hip bone density, and a 1.6% improvement in the lumbar spine bone density.   11/22/2010--treatment for osteopenia begun with Fosamax but patient discontinued it on her own.   11/09/2010--DEXA scan revealed average lumbar spine T score 0.5. Left femoral neck T score -1.4. Right femoral neck T score -1.1. Osteopenia.   06/09/2006--DEXA scan reveals lumbar spine T score 0.6. Left hip T  "score -0.8, left    • Pancreatic lesion, 4 mm, repeat study due June 2019 6/27/2018 11/27/2018--patient seen in follow-up and reports she is having urinary frequency without dysuria, but no incontinence and occasionally has some urgency.  She has no pain at the present time.  Apparently she saw a gynecologist and he did not examine her because \"we do not do Pap smears on a woman year age\".  06/27/2018--patient seen in follow-up and the results of the CT scan discussed.  Helga leigh   • Possible Pelvic congestion syndrome 6/27/2018 11/27/2018--patient seen in follow-up and reports she is having urinary frequency without dysuria, but no incontinence and occasionally has some urgency.  She has no pain at the present time.  Apparently she saw a gynecologist and he did not examine her because \"we do not do Pap smears on a woman year age\".  06/27/2018--patient seen in follow-up and the results of the CT scan discussed.  She lu   • Primary osteoarthritis of left knee 3/23/2004    03/23/2004--patient was evaluated by the orthopedist for left knee pain. X-rays revealed osteoarthritis with some medial joint space narrowing, subchondral sclerosis, and patellofemoral spurs. Cortisone injections were given.   • Subclinical hypothyroidism 12/5/2017    May 28, 2019--routine follow-up.  TSH slightly elevated at 4.56.  Free T3 and free T4 are normal.  Continued monitoring.  12/05/2017--routine follow-up.  TSH elevated at 5.19.  Free T3 and free T4 are normal.  Repeat thyroid function tests ordered.  Thyroid antibodies ordered.  If these confirm diagnosis of hypothyroidism and we will proceed with thyroid ultrasound.   • Vitamin D deficiency 4/27/2016         Past Surgical History:   Procedure Laterality Date   • CATARACT EXTRACTION Right 01/2014 January 2014--cataract extirpation and intraocular lens implantation right eye.    • CATARACT EXTRACTION Left 09/2010 September 2010--cataract extirpation and intraocular lens " implantation in the left eye.   • COLONOSCOPY  2006--the entire examined colon was normal except for scattered diverticuli.    • COLONOSCOPY  10/16/2001    10/16/2001--colonoscopy revealed a few left-sided scattered diverticuli.         Allergies   Allergen Reactions   • Neomycin-Bacitracin Zn-Polymyx Swelling     Neosporin   • Sulfa Antibiotics Hives           Current Outpatient Medications:   •  amLODIPine-benazepril (LOTREL) 5-40 MG per capsule, TAKE 1 CAPSULE DAILY, Disp: 90 capsule, Rfl: 4  •  aspirin 81 MG tablet, Take  by mouth., Disp: , Rfl:   •  atorvastatin (LIPITOR) 40 MG tablet, TAKE 1 TABLET DAILY EVERY EVENING FOR CHOLESTEROL, Disp: 90 tablet, Rfl: 4  •  Cholecalciferol (VITAMIN D3) 5000 UNITS capsule capsule, 1 by mouth daily as directed, Disp: 30 capsule, Rfl:   •  Garlic 1000 MG capsule, Take  by mouth., Disp: , Rfl:   •  metoprolol succinate XL (TOPROL-XL) 25 MG 24 hr tablet, TAKE 1 TABLET DAILY FOR BLOOD PRESSURE AND HEART AND PALPITATIONS, Disp: 90 tablet, Rfl: 1  •  Multiple Vitamin (MULTI VITAMIN DAILY PO), Take  by mouth daily., Disp: , Rfl:   •  Multiple Vitamins-Minerals (PRESERVISION AREDS PO), Take  by mouth Every Night., Disp: , Rfl:   •  timolol (TIMOPTIC) 0.5 % ophthalmic solution, , Disp: , Rfl:   •  hydroCHLOROthiazide (HYDRODIURIL) 25 MG tablet, TAKE 1 TABLET DAILY (NEED LABS AND FOLLOW UP AS SOON AS POSSIBLE), Disp: 90 tablet, Rfl: 3  •  Mirabegron ER (MYRBETRIQ) 50 MG tablet sustained-release 24 hour 24 hr tablet, Take 1 capsule daily for irritable bladder, Disp: 30 tablet, Rfl: 3      Family History   Problem Relation Age of Onset   • Cirrhosis Mother         Of Liver. Mother  at age 62 from alcoholic cirrhosis.   • Heart attack Father         Acute Myocardial Infarction.  Father  of a myocardial infarction at age 68.         Social History     Socioeconomic History   • Marital status:      Spouse name: Az   • Number of children: 3   • Years  "of education: Not on file   • Highest education level: Bachelor's degree (e.g., BA, AB, BS)   Occupational History   • Occupation: Retired (2011) [employer]   Social Needs   • Financial resource strain: Not hard at all   • Food insecurity:     Worry: Never true     Inability: Never true   • Transportation needs:     Medical: No     Non-medical: No   Tobacco Use   • Smoking status: Never Smoker   • Smokeless tobacco: Never Used   Substance and Sexual Activity   • Alcohol use: No   • Drug use: No   • Sexual activity: Not Currently     Partners: Male   Lifestyle   • Physical activity:     Days per week: 4 days     Minutes per session: 60 min   • Stress: To some extent   Relationships   • Social connections:     Talks on phone: Three times a week     Gets together: Once a week     Attends [employer] service: More than 4 times per year     Active member of club or organization: Yes     Attends meetings of clubs or organizations: More than 4 times per year     Relationship status:          Vitals:    06/25/20 1238   BP: 144/62   BP Location: Left arm   Pulse: 71   SpO2: 97%   Weight: 71.1 kg (156 lb 12.8 oz)   Height: 157.5 cm (62.01\")        Body mass index is 28.67 kg/m².      Physical Exam:    General: Alert and oriented x 3.  No acute distress.  Normal affect.  HEENT: Pupils equal, round, reactive to light; extraocular movements intact; sclerae nonicteric; pharynx, ear canals and TMs normal.  Neck: Without JVD, thyromegaly, bruit, or adenopathy.  Lungs: Clear to auscultation in all fields.  Heart: Regular rate and rhythm without murmur, rub, gallop, or click.  Abdomen: Soft, nontender, without hepatosplenomegaly or hernia.  Bowel sounds normal.  : Deferred.  Rectal: Deferred.  Extremities: Without clubbing, cyanosis, edema, or pulse deficit.  Neurologic: Intact without focal deficit.  Normal station and gait observed during ingress and egress from the examination room.  Skin: Without " significant lesion.  Musculoskeletal: Unremarkable.    Lab/other results:    NMR is perfect.  Total cholesterol 170.  Triglycerides 214.  LDL particle #969.  HDL particle number excellent at 42.1.  CMP normal except glucose 104.  Hemoglobin A1c 6.6.  Thyroid function test normal.  Vitamin D normal.  CBC normal.  CPK normal.    Assessment/Plan:     Diagnosis Plan   1. Impaired fasting glucose  Comprehensive Metabolic Panel    Hemoglobin A1c   2. Hyperlipidemia  CK    Comprehensive Metabolic Panel    NMR LipoProfile   3. Benign essential hypertension     4. Carotid artery plaque, 6/13/2019--mild bilateral.  05/30/2017--16-49% left.  Mild plaque right.  05/30/2014--mild bilateral carotid plaque.     5. Subclinical hypothyroidism  TSH    T4, Free    T3, Free   6. Heart palpitations     7. Osteopenia of multiple sites     8. Vitamin D deficiency  Vitamin D 25 Hydroxy   9. Irritable bladder     10. Pancreatic lesion, 6/13/2019--dilated pancreatic side duct.  No mass.  No further follow-up.  4 mm, repeat study due June 2019     11. Therapeutic drug monitoring  CBC (No Diff)   12. Chronic left hip pain  XR Hip With or Without Pelvis 2 - 3 View Left     Patient has a history of mild impaired fasting glucose and her hemoglobin A1c is now 6.6 which I think is laboratory error.  I do not feel the patient has mild overt diabetes and will not give her that diagnosis until this is confirmed.  Her cholesterol is under excellent control and so was her blood pressure.  She has carotid artery plaque and is up-to-date on her carotid Doppler study.  The diagnosis of subclinical hypothyroidism is somewhat suspect but we will continue to monitor.  Heart palpitations have not been an issue.  She has a history of osteopenia and needs a repeat DEXA scan.  Vitamin D is in the normal range.  Irritable bladder seems to be reasonably controlled with Myrbetriq.  The pancreatic lesion has been followed for some time and needs no further  follow-up.    Plan is as follows: DEXA scan ordered.  X-ray of the left hip and pelvis.  Patient will follow-up on the phone for the results and possible further instructions.  No change in current medical regimen.  Strongly recommend low carbohydrate diet, exercise, and weight loss.  Patient will follow-up in about 4 months with lab prior and this will also be subsequent Medicare wellness visit.    Addendum: At the end of visit patient reports her left hip pain is worsening.  No recent trauma.  X-rays ordered.  Patient will follow-up on the phone.    Procedures

## 2020-10-01 ENCOUNTER — LAB (OUTPATIENT)
Dept: LAB | Facility: HOSPITAL | Age: 85
End: 2020-10-01

## 2020-10-01 DIAGNOSIS — Z51.81 THERAPEUTIC DRUG MONITORING: ICD-10-CM

## 2020-10-01 DIAGNOSIS — R73.01 IMPAIRED FASTING GLUCOSE: Chronic | ICD-10-CM

## 2020-10-01 DIAGNOSIS — E78.2 MIXED HYPERLIPIDEMIA: Chronic | ICD-10-CM

## 2020-10-01 DIAGNOSIS — E03.8 SUBCLINICAL HYPOTHYROIDISM: ICD-10-CM

## 2020-10-01 DIAGNOSIS — E55.9 VITAMIN D DEFICIENCY: Chronic | ICD-10-CM

## 2020-10-01 LAB
25(OH)D3 SERPL-MCNC: 35 NG/ML (ref 30–100)
ALBUMIN SERPL-MCNC: 3.9 G/DL (ref 3.5–5.2)
ALBUMIN/GLOB SERPL: 1.3 G/DL
ALP SERPL-CCNC: 103 U/L (ref 39–117)
ALT SERPL W P-5'-P-CCNC: 31 U/L (ref 1–33)
ANION GAP SERPL CALCULATED.3IONS-SCNC: 12.2 MMOL/L (ref 5–15)
AST SERPL-CCNC: 23 U/L (ref 1–32)
BILIRUB SERPL-MCNC: 0.5 MG/DL (ref 0–1.2)
BUN SERPL-MCNC: 12 MG/DL (ref 8–23)
BUN/CREAT SERPL: 12.9 (ref 7–25)
CALCIUM SPEC-SCNC: 9.8 MG/DL (ref 8.6–10.5)
CHLORIDE SERPL-SCNC: 96 MMOL/L (ref 98–107)
CK SERPL-CCNC: 57 U/L (ref 20–180)
CO2 SERPL-SCNC: 26.8 MMOL/L (ref 22–29)
CREAT SERPL-MCNC: 0.93 MG/DL (ref 0.57–1)
DEPRECATED RDW RBC AUTO: 42.2 FL (ref 37–54)
ERYTHROCYTE [DISTWIDTH] IN BLOOD BY AUTOMATED COUNT: 12.3 % (ref 12.3–15.4)
GFR SERPL CREATININE-BSD FRML MDRD: 57 ML/MIN/1.73
GLOBULIN UR ELPH-MCNC: 3 GM/DL
GLUCOSE SERPL-MCNC: 108 MG/DL (ref 65–99)
HBA1C MFR BLD: 6.05 % (ref 4.8–5.6)
HCT VFR BLD AUTO: 38.2 % (ref 34–46.6)
HGB BLD-MCNC: 13.4 G/DL (ref 12–15.9)
MCH RBC QN AUTO: 32.7 PG (ref 26.6–33)
MCHC RBC AUTO-ENTMCNC: 35.1 G/DL (ref 31.5–35.7)
MCV RBC AUTO: 93.2 FL (ref 79–97)
PLATELET # BLD AUTO: 310 10*3/MM3 (ref 140–450)
PMV BLD AUTO: 10.7 FL (ref 6–12)
POTASSIUM SERPL-SCNC: 3.9 MMOL/L (ref 3.5–5.2)
PROT SERPL-MCNC: 6.9 G/DL (ref 6–8.5)
RBC # BLD AUTO: 4.1 10*6/MM3 (ref 3.77–5.28)
SODIUM SERPL-SCNC: 135 MMOL/L (ref 136–145)
T3FREE SERPL-MCNC: 3.31 PG/ML (ref 2–4.4)
T4 FREE SERPL-MCNC: 1.19 NG/DL (ref 0.93–1.7)
TSH SERPL DL<=0.05 MIU/L-ACNC: 4.44 UIU/ML (ref 0.27–4.2)
WBC # BLD AUTO: 6.72 10*3/MM3 (ref 3.4–10.8)

## 2020-10-01 PROCEDURE — 83036 HEMOGLOBIN GLYCOSYLATED A1C: CPT | Performed by: INTERNAL MEDICINE

## 2020-10-01 PROCEDURE — 84481 FREE ASSAY (FT-3): CPT | Performed by: INTERNAL MEDICINE

## 2020-10-01 PROCEDURE — 36415 COLL VENOUS BLD VENIPUNCTURE: CPT

## 2020-10-01 PROCEDURE — 84439 ASSAY OF FREE THYROXINE: CPT | Performed by: INTERNAL MEDICINE

## 2020-10-01 PROCEDURE — 80053 COMPREHEN METABOLIC PANEL: CPT | Performed by: INTERNAL MEDICINE

## 2020-10-01 PROCEDURE — 83704 LIPOPROTEIN BLD QUAN PART: CPT | Performed by: INTERNAL MEDICINE

## 2020-10-01 PROCEDURE — 82550 ASSAY OF CK (CPK): CPT | Performed by: INTERNAL MEDICINE

## 2020-10-01 PROCEDURE — 84443 ASSAY THYROID STIM HORMONE: CPT | Performed by: INTERNAL MEDICINE

## 2020-10-01 PROCEDURE — 80061 LIPID PANEL: CPT | Performed by: INTERNAL MEDICINE

## 2020-10-01 PROCEDURE — 85027 COMPLETE CBC AUTOMATED: CPT | Performed by: INTERNAL MEDICINE

## 2020-10-01 PROCEDURE — 82306 VITAMIN D 25 HYDROXY: CPT | Performed by: INTERNAL MEDICINE

## 2020-10-03 LAB
CHOLEST SERPL-MCNC: 171 MG/DL (ref 100–199)
HDL SERPL-SCNC: 40 UMOL/L
HDLC SERPL-MCNC: 60 MG/DL
LDL SERPL QN: 20.9 NM
LDL SERPL-SCNC: 1091 NMOL/L
LDL SMALL SERPL-SCNC: 567 NMOL/L
LDLC SERPL CALC-MCNC: 87 MG/DL (ref 0–99)
TRIGL SERPL-MCNC: 140 MG/DL (ref 0–149)

## 2020-10-08 ENCOUNTER — OFFICE VISIT (OUTPATIENT)
Dept: INTERNAL MEDICINE | Facility: CLINIC | Age: 85
End: 2020-10-08

## 2020-10-08 VITALS
WEIGHT: 161.2 LBS | BODY MASS INDEX: 29.66 KG/M2 | HEART RATE: 63 BPM | SYSTOLIC BLOOD PRESSURE: 126 MMHG | DIASTOLIC BLOOD PRESSURE: 60 MMHG | HEIGHT: 62 IN | OXYGEN SATURATION: 97 %

## 2020-10-08 DIAGNOSIS — E55.9 VITAMIN D DEFICIENCY: Chronic | ICD-10-CM

## 2020-10-08 DIAGNOSIS — Z51.81 THERAPEUTIC DRUG MONITORING: ICD-10-CM

## 2020-10-08 DIAGNOSIS — R00.2 HEART PALPITATIONS: Chronic | ICD-10-CM

## 2020-10-08 DIAGNOSIS — M85.89 OSTEOPENIA OF MULTIPLE SITES: Chronic | ICD-10-CM

## 2020-10-08 DIAGNOSIS — I10 BENIGN ESSENTIAL HYPERTENSION: Chronic | ICD-10-CM

## 2020-10-08 DIAGNOSIS — Z00.00 ENCOUNTER FOR SUBSEQUENT ANNUAL WELLNESS VISIT (AWV) IN MEDICARE PATIENT: Primary | ICD-10-CM

## 2020-10-08 DIAGNOSIS — N95.1 MENOPAUSAL STATE: Chronic | ICD-10-CM

## 2020-10-08 DIAGNOSIS — H40.10X0 OPEN-ANGLE GLAUCOMA OF BOTH EYES, UNSPECIFIED GLAUCOMA STAGE, UNSPECIFIED OPEN-ANGLE GLAUCOMA TYPE: Chronic | ICD-10-CM

## 2020-10-08 DIAGNOSIS — R73.01 IMPAIRED FASTING GLUCOSE: Chronic | ICD-10-CM

## 2020-10-08 DIAGNOSIS — K86.9 PANCREATIC LESION: Chronic | ICD-10-CM

## 2020-10-08 DIAGNOSIS — N32.89 IRRITABLE BLADDER: Chronic | ICD-10-CM

## 2020-10-08 DIAGNOSIS — E03.8 SUBCLINICAL HYPOTHYROIDISM: Chronic | ICD-10-CM

## 2020-10-08 DIAGNOSIS — I65.23 ATHEROSCLEROSIS OF BOTH CAROTID ARTERIES: Chronic | ICD-10-CM

## 2020-10-08 DIAGNOSIS — E78.2 MIXED HYPERLIPIDEMIA: Chronic | ICD-10-CM

## 2020-10-08 PROBLEM — M25.552 CHRONIC LEFT HIP PAIN: Chronic | Status: ACTIVE | Noted: 2020-06-25

## 2020-10-08 PROBLEM — G89.29 CHRONIC LEFT HIP PAIN: Chronic | Status: ACTIVE | Noted: 2020-06-25

## 2020-10-08 PROCEDURE — 99214 OFFICE O/P EST MOD 30 MIN: CPT | Performed by: INTERNAL MEDICINE

## 2020-10-08 PROCEDURE — G0439 PPPS, SUBSEQ VISIT: HCPCS | Performed by: INTERNAL MEDICINE

## 2020-10-08 RX ORDER — METOPROLOL SUCCINATE 25 MG/1
TABLET, EXTENDED RELEASE ORAL
Qty: 90 TABLET | Refills: 3
Start: 2020-10-08 | End: 2021-04-08 | Stop reason: SDUPTHER

## 2020-10-08 RX ORDER — TIMOLOL MALEATE 5 MG/ML
SOLUTION/ DROPS OPHTHALMIC
Start: 2020-10-08

## 2020-10-08 RX ORDER — HYDROCHLOROTHIAZIDE 25 MG/1
TABLET ORAL
Qty: 90 TABLET | Refills: 3
Start: 2020-10-08 | End: 2021-04-08 | Stop reason: SDUPTHER

## 2020-10-08 RX ORDER — AMLODIPINE BESYLATE AND BENAZEPRIL HYDROCHLORIDE 5; 40 MG/1; MG/1
CAPSULE ORAL
Qty: 90 CAPSULE | Refills: 3
Start: 2020-10-08 | End: 2020-12-30

## 2020-10-08 RX ORDER — TOLTERODINE 2 MG/1
CAPSULE, EXTENDED RELEASE ORAL
Qty: 30 CAPSULE | Refills: 6 | Status: SHIPPED | OUTPATIENT
Start: 2020-10-08 | End: 2021-04-08 | Stop reason: SDUPTHER

## 2020-10-08 RX ORDER — ATORVASTATIN CALCIUM 40 MG/1
TABLET, FILM COATED ORAL
Qty: 90 TABLET | Refills: 3 | Status: SHIPPED | OUTPATIENT
Start: 2020-10-08 | End: 2021-03-31

## 2020-10-08 NOTE — PROGRESS NOTES
The ABCs of the Annual Wellness Visit  Subsequent Medicare Wellness Visit    No chief complaint on file.      Subjective   History of Present Illness:  Stephanie Hawkins is a 87 y.o. female who presents for a Subsequent Medicare Wellness Visit.    HEALTH RISK ASSESSMENT    Recent Hospitalizations:  No hospitalization(s) within the last year.    Current Medical Providers:  Patient Care Team:  Jeovany Dietrich MD as PCP - General (Internal Medicine)    Smoking Status:  Social History     Tobacco Use   Smoking Status Never Smoker   Smokeless Tobacco Never Used       Alcohol Consumption:  Social History     Substance and Sexual Activity   Alcohol Use No       Depression Screen:   PHQ-2/PHQ-9 Depression Screening 10/8/2020   Little interest or pleasure in doing things 0   Feeling down, depressed, or hopeless 0   Trouble falling or staying asleep, or sleeping too much -   Feeling tired or having little energy -   Poor appetite or overeating -   Feeling bad about yourself - or that you are a failure or have let yourself or your family down -   Trouble concentrating on things, such as reading the newspaper or watching television -   Moving or speaking so slowly that other people could have noticed. Or the opposite - being so fidgety or restless that you have been moving around a lot more than usual -   Thoughts that you would be better off dead, or of hurting yourself in some way -   Total Score 0   If you checked off any problems, how difficult have these problems made it for you to do your work, take care of things at home, or get along with other people? -       Fall Risk Screen:  JOANAADI Fall Risk Assessment was completed, and patient is at LOW risk for falls.Assessment completed on:10/8/2020    Health Habits and Functional and Cognitive Screening:  Functional & Cognitive Status 10/8/2020   Do you have difficulty preparing food and eating? No   Do you have difficulty bathing yourself, getting dressed or grooming yourself?  No   Do you have difficulty using the toilet? No   Do you have difficulty moving around from place to place? No   Do you have trouble with steps or getting out of a bed or a chair? No   Current Diet Well Balanced Diet   Dental Exam Not up to date   Eye Exam Up to date   Exercise (times per week) 0 times per week   Current Exercise Activities Include None   Do you need help using the phone?  No   Are you deaf or do you have serious difficulty hearing?  No   Do you need help with transportation? No   Do you need help shopping? No   Do you need help preparing meals?  No   Do you need help with housework?  No   Do you need help with laundry? No   Do you need help taking your medications? No   Do you need help managing money? No   Do you ever drive or ride in a car without wearing a seat belt? No   Have you felt unusual stress, anger or loneliness in the last month? Yes   Who do you live with? Spouse   If you need help, do you have trouble finding someone available to you? No   Have you been bothered in the last four weeks by sexual problems? No   Do you have difficulty concentrating, remembering or making decisions? No         Does the patient have evidence of cognitive impairment? No    Asprin use counseling:Taking ASA appropriately as indicated    Age-appropriate Screening Schedule:  Refer to the list below for future screening recommendations based on patient's age, sex and/or medical conditions. Orders for these recommended tests are listed in the plan section. The patient has been provided with a written plan.    Health Maintenance   Topic Date Due   • DXA SCAN  06/20/2020   • MAMMOGRAM  12/28/2020   • LIPID PANEL  10/01/2021   • TDAP/TD VACCINES (3 - Td) 01/10/2030   • INFLUENZA VACCINE  Completed   • ZOSTER VACCINE  Discontinued          The following portions of the patient's history were reviewed and updated as appropriate: allergies, current medications, past family history, past medical history, past social  history, past surgical history and problem list.    Outpatient Medications Prior to Visit   Medication Sig Dispense Refill   • Cholecalciferol (VITAMIN D3) 5000 UNITS capsule capsule 1 by mouth daily as directed 30 capsule    • Garlic 1000 MG capsule Take  by mouth.     • Multiple Vitamin (MULTI VITAMIN DAILY PO) Take  by mouth daily.     • Multiple Vitamins-Minerals (PRESERVISION AREDS PO) Take  by mouth Every Night.     • aspirin 81 MG tablet Take  by mouth.     • amLODIPine-benazepril (LOTREL) 5-40 MG per capsule TAKE 1 CAPSULE DAILY 90 capsule 4   • atorvastatin (LIPITOR) 40 MG tablet TAKE 1 TABLET DAILY EVERY EVENING FOR CHOLESTEROL 90 tablet 4   • hydroCHLOROthiazide (HYDRODIURIL) 25 MG tablet TAKE 1 TABLET DAILY (NEED LABS AND FOLLOW UP AS SOON AS POSSIBLE) 90 tablet 3   • metoprolol succinate XL (TOPROL-XL) 25 MG 24 hr tablet TAKE 1 TABLET DAILY FOR BLOOD PRESSURE AND HEART AND PALPITATIONS 90 tablet 1   • Mirabegron ER (MYRBETRIQ) 50 MG tablet sustained-release 24 hour 24 hr tablet Take 1 capsule daily for irritable bladder 30 tablet 3   • timolol (TIMOPTIC) 0.5 % ophthalmic solution        No facility-administered medications prior to visit.        Patient Active Problem List   Diagnosis   • Benign essential hypertension   • Carotid artery plaque, 6/13/2019--mild bilateral.  05/30/2017--16-49% left.  Mild plaque right.  05/30/2014--mild bilateral carotid plaque.   • Diverticulosis of colon   • Heart palpitations   • Hyperlipidemia   • Irritable bowel syndrome   • Primary osteoarthritis of left knee   • Osteopenia of multiple sites   • Vitamin D deficiency   • Therapeutic drug monitoring   • Glaucoma, bilateral   • Menopausal state   • Irritable bladder   • Subclinical hypothyroidism   • Impaired fasting glucose   • Pancreatic lesion, 6/13/2019--dilated pancreatic side duct.  No mass.  No further follow-up.  4 mm, repeat study due June 2019   • Chronic left hip pain       Advanced Care Planning:  ACP  "discussion was held with the patient during this visit. Patient does not have an advance directive, information provided.    Review of Systems   Constitutional: Negative.    HENT: Negative.    Eyes: Negative.    Respiratory: Negative.    Cardiovascular: Negative.    Gastrointestinal: Negative.    Endocrine: Negative.    Genitourinary: Negative.    Musculoskeletal: Positive for arthralgias.   Skin: Negative.    Allergic/Immunologic: Negative.    Neurological: Negative.    Hematological: Negative.    Psychiatric/Behavioral: Negative.        Compared to one year ago, the patient feels her physical health is the same.  Compared to one year ago, the patient feels her mental health is the same.    Reviewed chart for potential of high risk medication in the elderly: yes  Reviewed chart for potential of harmful drug interactions in the elderly:yes    Objective         Vitals:    10/08/20 1311   BP: 126/60   BP Location: Left arm   Pulse: 63   SpO2: 97%   Weight: 73.1 kg (161 lb 3.2 oz)   Height: 157.5 cm (62.01\")       Body mass index is 29.48 kg/m².  Discussed the patient's BMI with her. The BMI is above average; BMI management plan is completed.    Physical Exam     General: Alert and oriented x 3.  No acute distress.  Normal affect. Overweight.  HEENT: Pupils equal, round, reactive to light; extraocular movements intact; sclerae nonicteric; pharynx, ear canals and TMs normal.  Neck: Without JVD, thyromegaly, bruit, or adenopathy.  Lungs: Clear to auscultation in all fields.  Heart: Regular rate and rhythm without murmur, rub, gallop, or click.  Abdomen: Soft, nontender, without hepatosplenomegaly or hernia.  Bowel sounds normal.  : Deferred.  Rectal: Deferred.  Extremities: Without clubbing, cyanosis, edema, or pulse deficit.  Neurologic: Intact without focal deficit.  Normal station and gait observed during ingress and egress from the examination room.  Skin: Without significant lesion.  Musculoskeletal: " Unremarkable.    Lab Results   Component Value Date    CHLPL 171 10/01/2020    TRIG 140 10/01/2020    HGBA1C 6.05 (H) 10/01/2020        Assessment/Plan   Medicare Risks and Personalized Health Plan  CMS Preventative Services Quick Reference  Advance Directive Discussion  Cardiovascular risk  Diabetic Lab Screening   Obesity/Overweight   Osteoprorosis Risk    The above risks/problems have been discussed with the patient.  Pertinent information has been shared with the patient in the After Visit Summary.  Follow up plans and orders are seen below in the Assessment/Plan Section.    Diagnoses and all orders for this visit:    1. Encounter for subsequent annual wellness visit (AWV) in Medicare patient (Primary)    2. Impaired fasting glucose  -     Comprehensive Metabolic Panel; Future  -     Hemoglobin A1c; Future    3. Hyperlipidemia  -     CK; Future  -     Comprehensive Metabolic Panel; Future  -     NMR LipoProfile; Future  -     atorvastatin (LIPITOR) 40 MG tablet; Take 1 p.o. daily for high cholesterol  Dispense: 90 tablet; Refill: 3    4. Benign essential hypertension  -     Comprehensive Metabolic Panel; Future  -     hydroCHLOROthiazide (HYDRODIURIL) 25 MG tablet; Take 1 p.o. daily for high blood pressure  Dispense: 90 tablet; Refill: 3  -     amLODIPine-benazepril (LOTREL) 5-40 MG per capsule; Take 1 p.o. every morning for high blood pressure  Dispense: 90 capsule; Refill: 3  -     metoprolol succinate XL (TOPROL-XL) 25 MG 24 hr tablet; Take 1 p.o. every morning for high blood pressure and heart palpitations  Dispense: 90 tablet; Refill: 3    5. Carotid artery plaque, 6/13/2019--mild bilateral.  05/30/2017--16-49% left.  Mild plaque right.  05/30/2014--mild bilateral carotid plaque.    6. Heart palpitations  -     metoprolol succinate XL (TOPROL-XL) 25 MG 24 hr tablet; Take 1 p.o. every morning for high blood pressure and heart palpitations  Dispense: 90 tablet; Refill: 3    7. Osteopenia of multiple  sites    8. Vitamin D deficiency  -     Vitamin D 25 Hydroxy; Future    9. Irritable bladder  -     Mirabegron ER (MYRBETRIQ) 50 MG tablet sustained-release 24 hour 24 hr tablet; Take 1 capsule daily for irritable bladder  Dispense: 30 tablet; Refill: 11    10. Subclinical hypothyroidism  -     TSH; Future  -     T4, Free; Future  -     T3, Free; Future    11. Pancreatic lesion, 6/13/2019--dilated pancreatic side duct.  No mass.  No further follow-up.  4 mm, repeat study due June 2019    12. Therapeutic drug monitoring  -     CBC (No Diff); Future    13. Open-angle glaucoma of both eyes, unspecified glaucoma stage, unspecified open-angle glaucoma type  -     timolol (TIMOPTIC) 0.5 % ophthalmic solution; Apply as directed to the affected eye for glaucoma  Dispense:        Follow Up:  No follow-ups on file.     An After Visit Summary and PPPS were given to the patient.

## 2020-10-08 NOTE — PROGRESS NOTES
10/08/2020    Patient Information  Stephanie Hawkins                                                                                          [id number]  [facility] [address]      12/16/1932  [unfilled]  There is no work phone number on file.    Chief Complaint:     Subsequent Medicare wellness visit.  Follow-up lab work in order to monitor chronic medical issues listed in history of present illness.  No new acute complaints.    History of Present Illness:    Patient with a history of impaired fasting glucose, hyperlipidemia, hypertension, carotid artery plaque, heart palpitations, osteopenia, vitamin D deficiency, irritable bladder, subclinical hypothyroidism, pancreatic lesion.  She presents today for subsequent Medicare wellness visit.  She also had lab work in order to monitor her chronic medical issues.  Her past medical history reviewed and updated were necessary including health maintenance parameters.  This reveals she is up-to-date or else accounted for.    Review of Systems   Constitution: Negative.   HENT: Negative.    Eyes: Negative.    Cardiovascular: Negative.    Respiratory: Negative.    Endocrine: Negative.    Hematologic/Lymphatic: Negative.    Skin: Negative.    Musculoskeletal: Positive for arthritis and joint pain.   Gastrointestinal: Negative.    Genitourinary: Negative.    Neurological: Negative.    Psychiatric/Behavioral: Negative.    Allergic/Immunologic: Negative.        Active Problems:    Patient Active Problem List   Diagnosis   • Benign essential hypertension   • Carotid artery plaque, 6/13/2019--mild bilateral.  05/30/2017--16-49% left.  Mild plaque right.  05/30/2014--mild bilateral carotid plaque.   • Diverticulosis of colon   • Heart palpitations   • Hyperlipidemia   • Irritable bowel syndrome   • Primary osteoarthritis of left knee   • Osteopenia of multiple sites   • Vitamin D deficiency   • Therapeutic drug monitoring   • Glaucoma, bilateral   • Menopausal state   •  Irritable bladder   • Subclinical hypothyroidism   • Impaired fasting glucose   • Pancreatic lesion, 6/13/2019--dilated pancreatic side duct.  No mass.  No further follow-up.  4 mm, repeat study due June 2019   • Chronic left hip pain         Past Medical History:   Diagnosis Date   • Benign essential hypertension 2/26/2001 02/26/2001--patient was seen as a new patient. She was ordered being treated for hypertension at that time.   • Carotid artery plaque, 6/13/2019--mild bilateral.  05/30/2017--16-49% left.  Mild plaque right.  05/30/2014--mild bilateral carotid plaque. 1/8/2002 June 13, 2019--carotid Doppler study reveals mild bilateral carotid plaque.  No percentage numbers given.  05/30/2017--carotid Doppler study reveals right plaque without stenosis.  There is 16-49% stenosis of the left ICA.  05/30/2014--vascular screen reveals mild bilateral carotid plaque, negative for AAA, negative for PAD.   06/19/2012--vascular screen revealed bilateral, less than 50% internal    • Chronic left hip pain 6/25/2020 June 25, 2020--patient reports that she has had intermittent left hip pain for several years now.  It may or may not be getting worse and we discussed orthopedic referral which we will hold off at the present time.  Instead we will obtain an x-ray and depending upon the result and her clinical course will determine orthopedic referral.   • Diverticulosis of colon 10/16/2001    11/03/2006--the entire examined colon was normal except for scattered diverticuli.   10/16/2001--colonoscopy revealed a few left-sided scattered diverticuli.   • Glaucoma, bilateral 6/1/2016 06/01/2016--routine follow-up.  She reports she was diagnosed with glaucoma about a month or so ago treated with Travatan eyedrops.   • Heart palpitations 10/13/2008    Patient has at a long history of heart palpitations.   10/13/2008--24-hour Holter monitor reveals an average heart rate of 69, a minimum heart rate was 55. Maximum heart  rate was 98. There were no pauses. Ventricular ectopy was zero. Supraventricular ectopy was 5585, with 15 supraventricular runs. Supraventricular bigeminy events were 6 and supraventricular trigeminy events were one. There was no atrial fibrillation/flutter. No ST segment changes. Patient's heart palpitations related to frequent PACs which have been controlled with atenolol.   • Hyperlipidemia 2/26/2001 02/26/2001--patient was seen as a new patient. She was already on cholesterol medication at that time.   • Impaired fasting glucose 6/14/2018 06/14/2018--routine follow-up.  Fasting serum glucose mildly elevated at 102.  Recommend low carbohydrate diet and weight loss.  Exercise.   • Irritable bladder 8/29/2017 08/29/2017--patient seen in follow-up after recent urinary tract infection with complaints of nocturia ×4-5 per night as well as frequency and urgency.  Repeat urinalysis is negative.  Urine culture sent.  Given the fact the patient is had the urinary symptoms prior to the onset of the recent UTI, I will treat her empirically for irritable bladder.  Myrbetriq 25 mg per day.  May increase to 50 mg per day if necessary.  I will have her follow-up in about 3 weeks to reassess his situation.   • Irritable bowel syndrome 4/27/2016   • Menopausal state 5/17/2017   • Osteopenia, 06/20/2018--lumbar spine 0.4.  Right femoral neck -1.1.  Left femoral neck -0.8.  05/30/2014--lumbar spine 0.6.  Right femoral neck -1.2.  Left femoral neck -1.7. 6/9/2006 06/20/2018--DEXA scan reveals average lumbar T score 0.4.  Right femoral neck T score -1.1.  Left femoral neck T score -0.8.  Assessment is osteopenia and compared to previous study in 2016 there is been no change in the right femoral neck and 17.2% improvement in the left femoral neck and a 2.1% decrease in the lumbar spine.  05/30/2014--DEXA scan reveals average lumbar spine T score of 0.6. Right femoral neck T score -1.2. Left femoral neck T score -1.7.  "Osteopenia. Compared to the previous examination of 11/09/2010, there is a 1% decrease in the left hip bone mineral density, 2.1% decrease in the right hip bone density, and a 1.6% improvement in the lumbar spine bone density.   11/22/2010--treatment for osteopenia begun with Fosamax but patient discontinued it on her own.   11/09/2010--DEXA scan revealed average lumbar spine T score 0.5. Left femoral neck T score -1.4. Right femoral neck T score -1.1. Osteopenia.   06/09/2006--DEXA scan reveals lumbar spine T score 0.6. Left hip T score -0.8, left    • Pancreatic lesion, 4 mm, repeat study due June 2019 6/27/2018 11/27/2018--patient seen in follow-up and reports she is having urinary frequency without dysuria, but no incontinence and occasionally has some urgency.  She has no pain at the present time.  Apparently she saw a gynecologist and he did not examine her because \"we do not do Pap smears on a woman year age\".  06/27/2018--patient seen in follow-up and the results of the CT scan discussed.  She lu   • Possible Pelvic congestion syndrome 6/27/2018 11/27/2018--patient seen in follow-up and reports she is having urinary frequency without dysuria, but no incontinence and occasionally has some urgency.  She has no pain at the present time.  Apparently she saw a gynecologist and he did not examine her because \"we do not do Pap smears on a woman year age\".  06/27/2018--patient seen in follow-up and the results of the CT scan discussed.  She lu   • Primary osteoarthritis of left knee 3/23/2004    03/23/2004--patient was evaluated by the orthopedist for left knee pain. X-rays revealed osteoarthritis with some medial joint space narrowing, subchondral sclerosis, and patellofemoral spurs. Cortisone injections were given.   • Subclinical hypothyroidism 12/5/2017    May 28, 2019--routine follow-up.  TSH slightly elevated at 4.56.  Free T3 and free T4 are normal.  Continued monitoring.  12/05/2017--routine " follow-up.  TSH elevated at 5.19.  Free T3 and free T4 are normal.  Repeat thyroid function tests ordered.  Thyroid antibodies ordered.  If these confirm diagnosis of hypothyroidism and we will proceed with thyroid ultrasound.   • Vitamin D deficiency 4/27/2016         Past Surgical History:   Procedure Laterality Date   • CATARACT EXTRACTION Right 01/2014 January 2014--cataract extirpation and intraocular lens implantation right eye.    • CATARACT EXTRACTION Left 09/2010 September 2010--cataract extirpation and intraocular lens implantation in the left eye.   • COLONOSCOPY  11/03/2006 11/03/2006--the entire examined colon was normal except for scattered diverticuli.    • COLONOSCOPY  10/16/2001    10/16/2001--colonoscopy revealed a few left-sided scattered diverticuli.         Allergies   Allergen Reactions   • Neomycin-Bacitracin Zn-Polymyx Swelling     Neosporin   • Sulfa Antibiotics Hives           Current Outpatient Medications:   •  amLODIPine-benazepril (LOTREL) 5-40 MG per capsule, Take 1 p.o. every morning for high blood pressure, Disp: 90 capsule, Rfl: 3  •  atorvastatin (LIPITOR) 40 MG tablet, Take 1 p.o. daily for high cholesterol, Disp: 90 tablet, Rfl: 3  •  Cholecalciferol (VITAMIN D3) 5000 UNITS capsule capsule, 1 by mouth daily as directed, Disp: 30 capsule, Rfl:   •  Garlic 1000 MG capsule, Take  by mouth., Disp: , Rfl:   •  hydroCHLOROthiazide (HYDRODIURIL) 25 MG tablet, Take 1 p.o. daily for high blood pressure, Disp: 90 tablet, Rfl: 3  •  metoprolol succinate XL (TOPROL-XL) 25 MG 24 hr tablet, Take 1 p.o. every morning for high blood pressure and heart palpitations, Disp: 90 tablet, Rfl: 3  •  Multiple Vitamin (MULTI VITAMIN DAILY PO), Take  by mouth daily., Disp: , Rfl:   •  Multiple Vitamins-Minerals (PRESERVISION AREDS PO), Take  by mouth Every Night., Disp: , Rfl:   •  timolol (TIMOPTIC) 0.5 % ophthalmic solution, Apply as directed to the affected eye for glaucoma, Disp:  , Rfl:   •   "aspirin 81 MG tablet, Take  by mouth., Disp: , Rfl:   •  tolterodine LA (Detrol LA) 2 MG 24 hr capsule, Take 1 p.o. daily for irritable bladder, Disp: 30 capsule, Rfl: 6      Family History   Problem Relation Age of Onset   • Cirrhosis Mother         Of Liver. Mother  at age 62 from alcoholic cirrhosis.   • Heart attack Father         Acute Myocardial Infarction.  Father  of a myocardial infarction at age 68.         Social History     Socioeconomic History   • Marital status:      Spouse name: Az   • Number of children: 3   • Years of education: Not on file   • Highest education level: Bachelor's degree (e.g., BA, AB, BS)   Occupational History   • Occupation: Retired () [employer]   Social Needs   • Financial resource strain: Not hard at all   • Food insecurity     Worry: Never true     Inability: Never true   • Transportation needs     Medical: No     Non-medical: No   Tobacco Use   • Smoking status: Never Smoker   • Smokeless tobacco: Never Used   Substance and Sexual Activity   • Alcohol use: No   • Drug use: No   • Sexual activity: Not Currently     Partners: Male   Lifestyle   • Physical activity     Days per week: 4 days     Minutes per session: 60 min   • Stress: To some extent   Relationships   • Social connections     Talks on phone: Three times a week     Gets together: Once a week     Attends [employer] service: More than 4 times per year     Active member of club or organization: Yes     Attends meetings of clubs or organizations: More than 4 times per year     Relationship status:          Vitals:    10/08/20 1311   BP: 126/60   BP Location: Left arm   Pulse: 63   SpO2: 97%   Weight: 73.1 kg (161 lb 3.2 oz)   Height: 157.5 cm (62.01\")        Body mass index is 29.48 kg/m².      Physical Exam:    General: Alert and oriented x 3.  No acute distress.  Normal affect.  HEENT: Pupils equal, round, reactive to light; extraocular movements intact; sclerae " nonicteric; pharynx, ear canals and TMs normal.  Neck: Without JVD, thyromegaly, bruit, or adenopathy.  Lungs: Clear to auscultation in all fields.  Heart: Regular rate and rhythm without murmur, rub, gallop, or click.  Abdomen: Soft, nontender, without hepatosplenomegaly or hernia.  Bowel sounds normal.  : Deferred.  Rectal: Deferred.  Extremities: Without clubbing, cyanosis, edema, or pulse deficit.  Neurologic: Intact without focal deficit.  Normal station and gait observed during ingress and egress from the examination room.  Skin: Without significant lesion.  Musculoskeletal: Unremarkable.    Lab/other results:    NMR reveals a total cholesterol of 171.  Triglycerides 140.  LDL particle number slightly elevated at 1091.  Small LDL particle number elevated at 567.  HDL particle number good at 40.0.  CMP normal except glucose 108, serum sodium low at 135.  CPK normal.  Hemoglobin A1c 6.05.  TSH slightly elevated at 4.44.  Free T3 and free T4 are normal.  Vitamin D normal.  CBC normal.    Assessment/Plan:     Diagnosis Plan   1. Encounter for subsequent annual wellness visit (AWV) in Medicare patient     2. Impaired fasting glucose  Comprehensive Metabolic Panel    Hemoglobin A1c   3. Hyperlipidemia  CK    Comprehensive Metabolic Panel    NMR LipoProfile    atorvastatin (LIPITOR) 40 MG tablet   4. Benign essential hypertension  Comprehensive Metabolic Panel    hydroCHLOROthiazide (HYDRODIURIL) 25 MG tablet    amLODIPine-benazepril (LOTREL) 5-40 MG per capsule    metoprolol succinate XL (TOPROL-XL) 25 MG 24 hr tablet   5. Carotid artery plaque, 6/13/2019--mild bilateral.  05/30/2017--16-49% left.  Mild plaque right.  05/30/2014--mild bilateral carotid plaque.     6. Heart palpitations  metoprolol succinate XL (TOPROL-XL) 25 MG 24 hr tablet   7. Osteopenia of multiple sites  DEXA Bone Density Axial   8. Vitamin D deficiency  Vitamin D 25 Hydroxy   9. Irritable bladder  tolterodine LA (Detrol LA) 2 MG 24 hr capsule    10. Subclinical hypothyroidism  TSH    T4, Free    T3, Free   11. Pancreatic lesion, 6/13/2019--dilated pancreatic side duct.  No mass.  No further follow-up.  4 mm, repeat study due June 2019     12. Therapeutic drug monitoring  CBC (No Diff)   13. Open-angle glaucoma of both eyes, unspecified glaucoma stage, unspecified open-angle glaucoma type  timolol (TIMOPTIC) 0.5 % ophthalmic solution   14. Menopausal state  DEXA Bone Density Axial     The subsequent Medicare wellness visit is documented on a separate note.    Hyperlipidemia is under good control on the current regimen and she is tolerating it well.  Blood pressures well controlled with amlodipine/benazepril as well as metoprolol.  Patient has carotid artery plaque and is up-to-date on her carotid Doppler study which was performed just last year.  Her heart palpitations controlled with the metoprolol.  She has osteopenia and needs a DEXA scan which we will order.  Vitamin D is normal.  Irritable bladder seems to be controlled adequately with Myrbetriq.  However, this medication is expensive and she currently does not have any and I do not have any samples.  She might do well on Detrol LA.  Subclinical hypothyroidism is present but not bad enough to warrant medication at this time.  We will continue to monitor.  The pancreatic lesion is actually a dilated pancreatic side duct and we do not need to order any further scans unless patient should become symptomatic.    Plan is as follows: No change in current medical regimen except discontinue Myrbetriq and start Detrol LA 2 mg/day.  DEXA scan ordered.  Patient should work on low carbohydrate diet, exercise, and weight loss.  I will have her follow-up in 6 months with lab prior or follow-up as needed.    Procedures

## 2020-10-29 ENCOUNTER — APPOINTMENT (OUTPATIENT)
Dept: BONE DENSITY | Facility: HOSPITAL | Age: 85
End: 2020-10-29

## 2020-12-30 DIAGNOSIS — I10 BENIGN ESSENTIAL HYPERTENSION: Chronic | ICD-10-CM

## 2020-12-30 RX ORDER — AMLODIPINE BESYLATE AND BENAZEPRIL HYDROCHLORIDE 5; 40 MG/1; MG/1
CAPSULE ORAL
Qty: 90 CAPSULE | Refills: 3 | Status: SHIPPED | OUTPATIENT
Start: 2020-12-30 | End: 2021-04-08 | Stop reason: SDUPTHER

## 2021-03-15 ENCOUNTER — BULK ORDERING (OUTPATIENT)
Dept: CASE MANAGEMENT | Facility: OTHER | Age: 86
End: 2021-03-15

## 2021-03-15 DIAGNOSIS — Z23 IMMUNIZATION DUE: ICD-10-CM

## 2021-03-31 DIAGNOSIS — E78.2 MIXED HYPERLIPIDEMIA: Chronic | ICD-10-CM

## 2021-03-31 RX ORDER — ATORVASTATIN CALCIUM 40 MG/1
TABLET, FILM COATED ORAL
Qty: 90 TABLET | Refills: 3 | Status: SHIPPED | OUTPATIENT
Start: 2021-03-31 | End: 2021-04-08 | Stop reason: SDUPTHER

## 2021-04-01 ENCOUNTER — TELEPHONE (OUTPATIENT)
Dept: INTERNAL MEDICINE | Facility: CLINIC | Age: 86
End: 2021-04-01

## 2021-04-01 NOTE — TELEPHONE ENCOUNTER
Caller: Stephanie Hawkins    Relationship: Self    Best call back number: [phone] (H)    What orders are you requesting (i.e. lab or imaging): LAB ORDER FOR TODAY    In what timeframe would the patient need to come in: TODAY    Where will you receive your lab/imaging services:     Additional notes: PATIENT CALLED AND STATES THAT SHE IS NEEDING TO COME IN TODAY TO HAVE LABS DONE. PLEASE CONTACT PATIENT TO ADVISE.      THANKS

## 2021-04-02 ENCOUNTER — LAB (OUTPATIENT)
Dept: LAB | Facility: HOSPITAL | Age: 86
End: 2021-04-02

## 2021-04-02 DIAGNOSIS — E03.8 SUBCLINICAL HYPOTHYROIDISM: Chronic | ICD-10-CM

## 2021-04-02 DIAGNOSIS — R73.01 IMPAIRED FASTING GLUCOSE: Chronic | ICD-10-CM

## 2021-04-02 DIAGNOSIS — I10 BENIGN ESSENTIAL HYPERTENSION: Chronic | ICD-10-CM

## 2021-04-02 DIAGNOSIS — E55.9 VITAMIN D DEFICIENCY: Chronic | ICD-10-CM

## 2021-04-02 DIAGNOSIS — Z51.81 THERAPEUTIC DRUG MONITORING: ICD-10-CM

## 2021-04-02 DIAGNOSIS — E78.2 MIXED HYPERLIPIDEMIA: Chronic | ICD-10-CM

## 2021-04-02 LAB
25(OH)D3 SERPL-MCNC: 34.9 NG/ML (ref 30–100)
ALBUMIN SERPL-MCNC: 4 G/DL (ref 3.5–5.2)
ALBUMIN/GLOB SERPL: 1.3 G/DL
ALP SERPL-CCNC: 94 U/L (ref 39–117)
ALT SERPL W P-5'-P-CCNC: 21 U/L (ref 1–33)
ANION GAP SERPL CALCULATED.3IONS-SCNC: 8.1 MMOL/L (ref 5–15)
AST SERPL-CCNC: 25 U/L (ref 1–32)
BILIRUB SERPL-MCNC: 0.5 MG/DL (ref 0–1.2)
BUN SERPL-MCNC: 11 MG/DL (ref 8–23)
BUN/CREAT SERPL: 12.6 (ref 7–25)
CALCIUM SPEC-SCNC: 9.2 MG/DL (ref 8.6–10.5)
CHLORIDE SERPL-SCNC: 97 MMOL/L (ref 98–107)
CK SERPL-CCNC: 51 U/L (ref 20–180)
CO2 SERPL-SCNC: 29.9 MMOL/L (ref 22–29)
CREAT SERPL-MCNC: 0.87 MG/DL (ref 0.57–1)
DEPRECATED RDW RBC AUTO: 45.4 FL (ref 37–54)
ERYTHROCYTE [DISTWIDTH] IN BLOOD BY AUTOMATED COUNT: 12.8 % (ref 12.3–15.4)
GFR SERPL CREATININE-BSD FRML MDRD: 61 ML/MIN/1.73
GLOBULIN UR ELPH-MCNC: 3 GM/DL
GLUCOSE SERPL-MCNC: 115 MG/DL (ref 65–99)
HBA1C MFR BLD: 6.26 % (ref 4.8–5.6)
HCT VFR BLD AUTO: 40.6 % (ref 34–46.6)
HGB BLD-MCNC: 13.5 G/DL (ref 12–15.9)
MCH RBC QN AUTO: 32.1 PG (ref 26.6–33)
MCHC RBC AUTO-ENTMCNC: 33.3 G/DL (ref 31.5–35.7)
MCV RBC AUTO: 96.4 FL (ref 79–97)
PLATELET # BLD AUTO: 331 10*3/MM3 (ref 140–450)
PMV BLD AUTO: 10.5 FL (ref 6–12)
POTASSIUM SERPL-SCNC: 4.4 MMOL/L (ref 3.5–5.2)
PROT SERPL-MCNC: 7 G/DL (ref 6–8.5)
RBC # BLD AUTO: 4.21 10*6/MM3 (ref 3.77–5.28)
SODIUM SERPL-SCNC: 135 MMOL/L (ref 136–145)
T3FREE SERPL-MCNC: 3.14 PG/ML (ref 2–4.4)
T4 FREE SERPL-MCNC: 1.07 NG/DL (ref 0.93–1.7)
TSH SERPL DL<=0.05 MIU/L-ACNC: 3.99 UIU/ML (ref 0.27–4.2)
WBC # BLD AUTO: 6.79 10*3/MM3 (ref 3.4–10.8)

## 2021-04-02 PROCEDURE — 82550 ASSAY OF CK (CPK): CPT

## 2021-04-02 PROCEDURE — 36415 COLL VENOUS BLD VENIPUNCTURE: CPT

## 2021-04-02 PROCEDURE — 82306 VITAMIN D 25 HYDROXY: CPT

## 2021-04-02 PROCEDURE — 84481 FREE ASSAY (FT-3): CPT

## 2021-04-02 PROCEDURE — 85027 COMPLETE CBC AUTOMATED: CPT

## 2021-04-02 PROCEDURE — 84443 ASSAY THYROID STIM HORMONE: CPT

## 2021-04-02 PROCEDURE — 80061 LIPID PANEL: CPT

## 2021-04-02 PROCEDURE — 84439 ASSAY OF FREE THYROXINE: CPT

## 2021-04-02 PROCEDURE — 83036 HEMOGLOBIN GLYCOSYLATED A1C: CPT

## 2021-04-02 PROCEDURE — 80053 COMPREHEN METABOLIC PANEL: CPT

## 2021-04-02 PROCEDURE — 83704 LIPOPROTEIN BLD QUAN PART: CPT

## 2021-04-04 LAB
CHOLEST SERPL-MCNC: 170 MG/DL (ref 100–199)
HDL SERPL-SCNC: 40.3 UMOL/L
HDLC SERPL-MCNC: 63 MG/DL
LDL SERPL QN: 21.1 NM
LDL SERPL-SCNC: 934 NMOL/L
LDL SMALL SERPL-SCNC: 422 NMOL/L
LDLC SERPL CALC-MCNC: 83 MG/DL (ref 0–99)
TRIGL SERPL-MCNC: 137 MG/DL (ref 0–149)

## 2021-04-08 ENCOUNTER — OFFICE VISIT (OUTPATIENT)
Dept: INTERNAL MEDICINE | Facility: CLINIC | Age: 86
End: 2021-04-08

## 2021-04-08 VITALS
WEIGHT: 161 LBS | SYSTOLIC BLOOD PRESSURE: 114 MMHG | HEIGHT: 62 IN | HEART RATE: 54 BPM | BODY MASS INDEX: 29.63 KG/M2 | DIASTOLIC BLOOD PRESSURE: 56 MMHG | OXYGEN SATURATION: 97 %

## 2021-04-08 DIAGNOSIS — R00.2 HEART PALPITATIONS: Chronic | ICD-10-CM

## 2021-04-08 DIAGNOSIS — M85.89 OSTEOPENIA OF MULTIPLE SITES: Chronic | ICD-10-CM

## 2021-04-08 DIAGNOSIS — E78.2 MIXED HYPERLIPIDEMIA: Chronic | ICD-10-CM

## 2021-04-08 DIAGNOSIS — N95.1 MENOPAUSAL STATE: Chronic | ICD-10-CM

## 2021-04-08 DIAGNOSIS — Z51.81 THERAPEUTIC DRUG MONITORING: ICD-10-CM

## 2021-04-08 DIAGNOSIS — N32.89 IRRITABLE BLADDER: Chronic | ICD-10-CM

## 2021-04-08 DIAGNOSIS — Z12.31 BREAST CANCER SCREENING BY MAMMOGRAM: ICD-10-CM

## 2021-04-08 DIAGNOSIS — K58.0 IRRITABLE BOWEL SYNDROME WITH DIARRHEA: Chronic | ICD-10-CM

## 2021-04-08 DIAGNOSIS — K86.9 PANCREATIC LESION: Chronic | ICD-10-CM

## 2021-04-08 DIAGNOSIS — I65.23 ATHEROSCLEROSIS OF BOTH CAROTID ARTERIES: Chronic | ICD-10-CM

## 2021-04-08 DIAGNOSIS — I10 BENIGN ESSENTIAL HYPERTENSION: Chronic | ICD-10-CM

## 2021-04-08 DIAGNOSIS — E03.8 SUBCLINICAL HYPOTHYROIDISM: Chronic | ICD-10-CM

## 2021-04-08 DIAGNOSIS — E55.9 VITAMIN D DEFICIENCY: Chronic | ICD-10-CM

## 2021-04-08 DIAGNOSIS — R73.01 IMPAIRED FASTING GLUCOSE: Primary | Chronic | ICD-10-CM

## 2021-04-08 PROCEDURE — 99214 OFFICE O/P EST MOD 30 MIN: CPT | Performed by: INTERNAL MEDICINE

## 2021-04-08 RX ORDER — AMLODIPINE BESYLATE AND BENAZEPRIL HYDROCHLORIDE 5; 40 MG/1; MG/1
CAPSULE ORAL
Qty: 90 CAPSULE | Refills: 3 | Status: SHIPPED | OUTPATIENT
Start: 2021-04-08 | End: 2022-05-20

## 2021-04-08 RX ORDER — HYDROCHLOROTHIAZIDE 25 MG/1
TABLET ORAL
Qty: 90 TABLET | Refills: 3 | Status: SHIPPED | OUTPATIENT
Start: 2021-04-08 | End: 2022-05-04

## 2021-04-08 RX ORDER — ATORVASTATIN CALCIUM 40 MG/1
TABLET, FILM COATED ORAL
Qty: 90 TABLET | Refills: 3 | Status: SHIPPED | OUTPATIENT
Start: 2021-04-08 | End: 2022-05-04

## 2021-04-08 RX ORDER — TOLTERODINE 2 MG/1
CAPSULE, EXTENDED RELEASE ORAL
Qty: 90 CAPSULE | Refills: 3 | Status: SHIPPED | OUTPATIENT
Start: 2021-04-08

## 2021-04-08 RX ORDER — METOPROLOL SUCCINATE 25 MG/1
TABLET, EXTENDED RELEASE ORAL
Qty: 90 TABLET | Refills: 3 | Status: SHIPPED | OUTPATIENT
Start: 2021-04-08 | End: 2022-03-03

## 2021-04-08 NOTE — PROGRESS NOTES
04/08/2021    Patient Information  Stephanie Hawkins                                                                                          [id number]  [facility] [address]      12/16/1932  [unfilled]  There is no work phone number on file.    Chief Complaint:     Follow-up lab work in order to monitor chronic medical issues listed in history of present illness.  No new acute complaints.    History of Present Illness:    Patient with a history of impaired fasting glucose, subclinical hypothyroidism, hyperlipidemia, hypertension, carotid artery plaque, heart palpitations, osteopenia, vitamin D deficiency, menopausal state, irritable bladder, pancreatic lesion likely benign, IBS.  She presents today for follow-up with lab prior in order to monitor chronic medical issues.  Past medical history reviewed and updated were necessary including health maintenance parameters.  This reveals she is currently up-to-date or else accounted for with the exception of needing a mammogram.    Review of Systems   Constitutional: Negative.   HENT: Negative.    Eyes: Negative.    Cardiovascular: Negative.    Respiratory: Negative.    Endocrine: Negative.    Hematologic/Lymphatic: Negative.    Skin: Negative.    Musculoskeletal: Positive for arthritis and joint pain.   Gastrointestinal: Negative.    Genitourinary: Negative.    Neurological: Negative.    Psychiatric/Behavioral: Negative.    Allergic/Immunologic: Negative.        Active Problems:    Patient Active Problem List   Diagnosis   • Benign essential hypertension   • Carotid artery plaque, 6/13/2019--mild bilateral.  05/30/2017--16-49% left.  Mild plaque right.  05/30/2014--mild bilateral carotid plaque.   • Diverticulosis of colon   • Heart palpitations   • Hyperlipidemia   • Irritable bowel syndrome   • Primary osteoarthritis of left knee   • Osteopenia of multiple sites   • Vitamin D deficiency   • Therapeutic drug monitoring   • Glaucoma, bilateral   •  Menopausal state   • Irritable bladder   • Subclinical hypothyroidism   • Impaired fasting glucose   • Pancreatic lesion, 6/13/2019--dilated pancreatic side duct.  No mass.  No further follow-up.  4 mm, repeat study due June 2019   • Chronic left hip pain         Past Medical History:   Diagnosis Date   • Benign essential hypertension 2/26/2001 02/26/2001--patient was seen as a new patient. She was ordered being treated for hypertension at that time.   • Carotid artery plaque, 6/13/2019--mild bilateral.  05/30/2017--16-49% left.  Mild plaque right.  05/30/2014--mild bilateral carotid plaque. 1/8/2002 June 13, 2019--carotid Doppler study reveals mild bilateral carotid plaque.  No percentage numbers given.  05/30/2017--carotid Doppler study reveals right plaque without stenosis.  There is 16-49% stenosis of the left ICA.  05/30/2014--vascular screen reveals mild bilateral carotid plaque, negative for AAA, negative for PAD.   06/19/2012--vascular screen revealed bilateral, less than 50% internal    • Chronic left hip pain 6/25/2020 June 25, 2020--patient reports that she has had intermittent left hip pain for several years now.  It may or may not be getting worse and we discussed orthopedic referral which we will hold off at the present time.  Instead we will obtain an x-ray and depending upon the result and her clinical course will determine orthopedic referral.   • Diverticulosis of colon 10/16/2001    11/03/2006--the entire examined colon was normal except for scattered diverticuli.   10/16/2001--colonoscopy revealed a few left-sided scattered diverticuli.   • Glaucoma, bilateral 6/1/2016 06/01/2016--routine follow-up.  She reports she was diagnosed with glaucoma about a month or so ago treated with Travatan eyedrops.   • Heart palpitations 10/13/2008    Patient has at a long history of heart palpitations.   10/13/2008--24-hour Holter monitor reveals an average heart rate of 69, a minimum heart rate was  55. Maximum heart rate was 98. There were no pauses. Ventricular ectopy was zero. Supraventricular ectopy was 5585, with 15 supraventricular runs. Supraventricular bigeminy events were 6 and supraventricular trigeminy events were one. There was no atrial fibrillation/flutter. No ST segment changes. Patient's heart palpitations related to frequent PACs which have been controlled with atenolol.   • Hyperlipidemia 2/26/2001 02/26/2001--patient was seen as a new patient. She was already on cholesterol medication at that time.   • Impaired fasting glucose 6/14/2018 06/14/2018--routine follow-up.  Fasting serum glucose mildly elevated at 102.  Recommend low carbohydrate diet and weight loss.  Exercise.   • Irritable bladder 8/29/2017 08/29/2017--patient seen in follow-up after recent urinary tract infection with complaints of nocturia ×4-5 per night as well as frequency and urgency.  Repeat urinalysis is negative.  Urine culture sent.  Given the fact the patient is had the urinary symptoms prior to the onset of the recent UTI, I will treat her empirically for irritable bladder.  Myrbetriq 25 mg per day.  May increase to 50 mg per day if necessary.  I will have her follow-up in about 3 weeks to reassess his situation.   • Irritable bowel syndrome 4/27/2016   • Menopausal state 5/17/2017   • Osteopenia, 06/20/2018--lumbar spine 0.4.  Right femoral neck -1.1.  Left femoral neck -0.8.  05/30/2014--lumbar spine 0.6.  Right femoral neck -1.2.  Left femoral neck -1.7. 6/9/2006 06/20/2018--DEXA scan reveals average lumbar T score 0.4.  Right femoral neck T score -1.1.  Left femoral neck T score -0.8.  Assessment is osteopenia and compared to previous study in 2016 there is been no change in the right femoral neck and 17.2% improvement in the left femoral neck and a 2.1% decrease in the lumbar spine.  05/30/2014--DEXA scan reveals average lumbar spine T score of 0.6. Right femoral neck T score -1.2. Left femoral neck T  "score -1.7. Osteopenia. Compared to the previous examination of 11/09/2010, there is a 1% decrease in the left hip bone mineral density, 2.1% decrease in the right hip bone density, and a 1.6% improvement in the lumbar spine bone density.   11/22/2010--treatment for osteopenia begun with Fosamax but patient discontinued it on her own.   11/09/2010--DEXA scan revealed average lumbar spine T score 0.5. Left femoral neck T score -1.4. Right femoral neck T score -1.1. Osteopenia.   06/09/2006--DEXA scan reveals lumbar spine T score 0.6. Left hip T score -0.8, left    • Pancreatic lesion, 4 mm, repeat study due June 2019 6/27/2018 11/27/2018--patient seen in follow-up and reports she is having urinary frequency without dysuria, but no incontinence and occasionally has some urgency.  She has no pain at the present time.  Apparently she saw a gynecologist and he did not examine her because \"we do not do Pap smears on a woman year age\".  06/27/2018--patient seen in follow-up and the results of the CT scan discussed.  She lu   • Possible Pelvic congestion syndrome 6/27/2018 11/27/2018--patient seen in follow-up and reports she is having urinary frequency without dysuria, but no incontinence and occasionally has some urgency.  She has no pain at the present time.  Apparently she saw a gynecologist and he did not examine her because \"we do not do Pap smears on a woman year age\".  06/27/2018--patient seen in follow-up and the results of the CT scan discussed.  She lu   • Primary osteoarthritis of left knee 3/23/2004    03/23/2004--patient was evaluated by the orthopedist for left knee pain. X-rays revealed osteoarthritis with some medial joint space narrowing, subchondral sclerosis, and patellofemoral spurs. Cortisone injections were given.   • Subclinical hypothyroidism 12/5/2017    May 28, 2019--routine follow-up.  TSH slightly elevated at 4.56.  Free T3 and free T4 are normal.  Continued monitoring.  " 12/05/2017--routine follow-up.  TSH elevated at 5.19.  Free T3 and free T4 are normal.  Repeat thyroid function tests ordered.  Thyroid antibodies ordered.  If these confirm diagnosis of hypothyroidism and we will proceed with thyroid ultrasound.   • Vitamin D deficiency 4/27/2016         Past Surgical History:   Procedure Laterality Date   • CATARACT EXTRACTION Right 01/2014 January 2014--cataract extirpation and intraocular lens implantation right eye.    • CATARACT EXTRACTION Left 09/2010 September 2010--cataract extirpation and intraocular lens implantation in the left eye.   • COLONOSCOPY  11/03/2006 11/03/2006--the entire examined colon was normal except for scattered diverticuli.    • COLONOSCOPY  10/16/2001    10/16/2001--colonoscopy revealed a few left-sided scattered diverticuli.         Allergies   Allergen Reactions   • Neomycin-Bacitracin Zn-Polymyx Swelling     Neosporin   • Sulfa Antibiotics Hives           Current Outpatient Medications:   •  amLODIPine-benazepril (LOTREL) 5-40 MG per capsule, TAKE 1 CAPSULE DAILY, Disp: 90 capsule, Rfl: 3  •  aspirin 81 MG tablet, Take  by mouth., Disp: , Rfl:   •  atorvastatin (LIPITOR) 40 MG tablet, Take 1 p.o. daily for high cholesterol, Disp: 90 tablet, Rfl: 3  •  Cholecalciferol (VITAMIN D3) 5000 UNITS capsule capsule, 1 by mouth daily as directed, Disp: 30 capsule, Rfl:   •  Garlic 1000 MG capsule, Take  by mouth., Disp: , Rfl:   •  hydroCHLOROthiazide (HYDRODIURIL) 25 MG tablet, Take 1 p.o. daily for high blood pressure, Disp: 90 tablet, Rfl: 3  •  metoprolol succinate XL (TOPROL-XL) 25 MG 24 hr tablet, Take 1 p.o. every morning for high blood pressure and heart palpitations, Disp: 90 tablet, Rfl: 3  •  Multiple Vitamin (MULTI VITAMIN DAILY PO), Take  by mouth daily., Disp: , Rfl:   •  Multiple Vitamins-Minerals (PRESERVISION AREDS PO), Take  by mouth Every Night., Disp: , Rfl:   •  timolol (TIMOPTIC) 0.5 % ophthalmic solution, Apply as directed to  "the affected eye for glaucoma, Disp:  , Rfl:   •  tolterodine LA (Detrol LA) 2 MG 24 hr capsule, Take 1 p.o. daily for irritable bladder, Disp: 90 capsule, Rfl: 3      Family History   Problem Relation Age of Onset   • Cirrhosis Mother         Of Liver. Mother  at age 62 from alcoholic cirrhosis.   • Heart attack Father         Acute Myocardial Infarction.  Father  of a myocardial infarction at age 68.         Social History     Socioeconomic History   • Marital status:      Spouse name: Az   • Number of children: 3   • Years of education: Not on file   • Highest education level: Bachelor's degree (e.g., BA, AB, BS)   Tobacco Use   • Smoking status: Never Smoker   • Smokeless tobacco: Never Used   Vaping Use   • Vaping Use: Never used   Substance and Sexual Activity   • Alcohol use: No   • Drug use: No   • Sexual activity: Not Currently     Partners: Male         Vitals:    21 1337   BP: 114/56   BP Location: Right arm   Pulse: 54   SpO2: 97%   Weight: 73 kg (161 lb)   Height: 157.5 cm (62.01\")        Body mass index is 29.44 kg/m².      Physical Exam:    General: Alert and oriented x 3.  No acute distress.  Normal affect.  Mildly overweight.  HEENT: Pupils equal, round, reactive to light; extraocular movements intact; sclerae nonicteric; pharynx, ear canals and TMs normal.  Neck: Without JVD, thyromegaly, bruit, or adenopathy.  Lungs: Clear to auscultation in all fields.  Heart: Regular rate and rhythm without murmur, rub, gallop, or click.  Abdomen: Soft, nontender, without hepatosplenomegaly or hernia.  Bowel sounds normal.  : Deferred.  Rectal: Deferred.  Extremities: Without clubbing, cyanosis, edema, or pulse deficit.  Neurologic: Intact without focal deficit.  Normal station and gait observed during ingress and egress from the examination room.  Skin: Without significant lesion.  Musculoskeletal: Unremarkable.    Lab/other results:    NMR is perfect.  Total cholesterol 170.  LDL " particle #934.  HDL particle #40.3.  CMP normal except glucose 115, sodium slightly low 135.  Hemoglobin A1c 6.26.  Vitamin D normal at 34.9.  Thyroid function test normal.  TSH is upper limit of normal at 3.99.  CPK normal.  CBC normal.    Assessment/Plan:     Diagnosis Plan   1. Impaired fasting glucose  Comprehensive Metabolic Panel    Hemoglobin A1c   2. Subclinical hypothyroidism     3. Hyperlipidemia  CK    Comprehensive Metabolic Panel    NMR LipoProfile    TSH    T4, Free    T3, Free    atorvastatin (LIPITOR) 40 MG tablet   4. Benign essential hypertension  hydroCHLOROthiazide (HYDRODIURIL) 25 MG tablet    amLODIPine-benazepril (LOTREL) 5-40 MG per capsule    metoprolol succinate XL (TOPROL-XL) 25 MG 24 hr tablet   5. Carotid artery plaque, 6/13/2019--mild bilateral.  05/30/2017--16-49% left.  Mild plaque right.  05/30/2014--mild bilateral carotid plaque.     6. Heart palpitations  metoprolol succinate XL (TOPROL-XL) 25 MG 24 hr tablet   7. Osteopenia of multiple sites  DEXA Bone Density Axial   8. Vitamin D deficiency  Vitamin D 25 Hydroxy   9. Menopausal state  DEXA Bone Density Axial   10. Irritable bladder  tolterodine LA (Detrol LA) 2 MG 24 hr capsule   11. Pancreatic lesion, 6/13/2019--dilated pancreatic side duct.  No mass.  No further follow-up.  4 mm, repeat study due June 2019     12. Irritable bowel syndrome with diarrhea     13. Therapeutic drug monitoring  CBC (No Diff)   14. Breast cancer screening by mammogram  Mammo Screening Bilateral With CAD     Patient has impaired fasting glucose that is close to being overt diabetes.  Strongly recommend low carbohydrate diet and weight loss.  Subclinical hypothyroidism is very borderline but we will continue to monitor.  Hyperlipidemia is under excellent control  On the current regimen of Lipitor.  Her blood pressure is well controlled with metoprolol and hydrochlorothiazide.  She is also on amlodipine/benazepril 5/40, 1 p.o. daily.  She is tolerating  it well.  Patient has carotid artery plaque and will be due a carotid Doppler study later this year.  She has irritable bladder and takes Detrol LA which seems to be helpful.  Her vitamin D is in the normal range which is important given her osteopenia.  The pancreatic lesion was followed for quite some time and appears to be a dilated pancreatic side and felt to be benign by the radiology service.  IBS is currently not an issue.    Plan is as follows: No change in current medical regimen.  Mammogram ordered.  Patient will follow-up after 10/8/2021 with lab prior and this will be subsequent Medicare wellness visit.  Otherwise she will follow-up as needed.  I am going to reorder her DEXA scan as this was ordered late last year and not yet done.      Procedures

## 2021-04-14 ENCOUNTER — TRANSCRIBE ORDERS (OUTPATIENT)
Dept: ADMINISTRATIVE | Facility: HOSPITAL | Age: 86
End: 2021-04-14

## 2021-04-14 DIAGNOSIS — Z12.31 ENCOUNTER FOR SCREENING MAMMOGRAM FOR MALIGNANT NEOPLASM OF BREAST: Primary | ICD-10-CM

## 2021-07-06 ENCOUNTER — APPOINTMENT (OUTPATIENT)
Dept: BONE DENSITY | Facility: HOSPITAL | Age: 86
End: 2021-07-06

## 2021-10-13 ENCOUNTER — OFFICE VISIT (OUTPATIENT)
Dept: INTERNAL MEDICINE | Facility: CLINIC | Age: 86
End: 2021-10-13

## 2021-10-13 VITALS
HEART RATE: 61 BPM | SYSTOLIC BLOOD PRESSURE: 120 MMHG | OXYGEN SATURATION: 98 % | WEIGHT: 157 LBS | BODY MASS INDEX: 28.89 KG/M2 | HEIGHT: 62 IN | DIASTOLIC BLOOD PRESSURE: 72 MMHG | RESPIRATION RATE: 15 BRPM

## 2021-10-13 DIAGNOSIS — E55.9 VITAMIN D DEFICIENCY: Chronic | ICD-10-CM

## 2021-10-13 DIAGNOSIS — L82.0 INFLAMED SEBORRHEIC KERATOSIS: Primary | ICD-10-CM

## 2021-10-13 DIAGNOSIS — Z23 NEED FOR INFLUENZA VACCINATION: ICD-10-CM

## 2021-10-13 DIAGNOSIS — R73.01 IMPAIRED FASTING GLUCOSE: Chronic | ICD-10-CM

## 2021-10-13 DIAGNOSIS — Z51.81 THERAPEUTIC DRUG MONITORING: ICD-10-CM

## 2021-10-13 DIAGNOSIS — E78.2 MIXED HYPERLIPIDEMIA: Chronic | ICD-10-CM

## 2021-10-13 PROCEDURE — G0008 ADMIN INFLUENZA VIRUS VAC: HCPCS | Performed by: INTERNAL MEDICINE

## 2021-10-13 PROCEDURE — 90662 IIV NO PRSV INCREASED AG IM: CPT | Performed by: INTERNAL MEDICINE

## 2021-10-13 PROCEDURE — 17110 DESTRUCTION B9 LES UP TO 14: CPT | Performed by: INTERNAL MEDICINE

## 2021-10-13 NOTE — PROGRESS NOTES
Procedure note:      October 13, 2021--patient has an irritated/inflamed seborrheic keratosis that is located on her anterior lower neck near the upper limits of the sternum.  It is approximately 6 mm in diameter and nodular.  Slight surrounding erythema.  Does not have any suspicious characteristics to suggest cancerous lesion.  The lesion was destroyed today using a Hyfrecator.  See the procedure note.    Procedure: Verbal informed consent given, risk benefits, alternatives and potential complications discussed.  The area in question at the base of her neck anteriorly was prepped twice with alcohol wipes anesthetized with 1% lidocaine.  The lesion was then snipped using surgical scissors and the base of the lesion was destroyed with the Hyfrecator.  Patient tolerated procedure well without apparent complications.  I did not send the specimen to pathology as it is clearly benign.  Post procedure instructions given.    Influenza vaccine high dose given.

## 2021-10-15 ENCOUNTER — LAB (OUTPATIENT)
Dept: LAB | Facility: HOSPITAL | Age: 86
End: 2021-10-15

## 2021-10-15 LAB
25(OH)D3 SERPL-MCNC: 32.6 NG/ML (ref 30–100)
ALBUMIN SERPL-MCNC: 4.1 G/DL (ref 3.5–5.2)
ALBUMIN/GLOB SERPL: 1.4 G/DL
ALP SERPL-CCNC: 87 U/L (ref 39–117)
ALT SERPL W P-5'-P-CCNC: 25 U/L (ref 1–33)
ANION GAP SERPL CALCULATED.3IONS-SCNC: 12 MMOL/L (ref 5–15)
AST SERPL-CCNC: 23 U/L (ref 1–32)
BILIRUB SERPL-MCNC: 0.4 MG/DL (ref 0–1.2)
BUN SERPL-MCNC: 17 MG/DL (ref 8–23)
BUN/CREAT SERPL: 18.3 (ref 7–25)
CALCIUM SPEC-SCNC: 10 MG/DL (ref 8.6–10.5)
CHLORIDE SERPL-SCNC: 99 MMOL/L (ref 98–107)
CK SERPL-CCNC: 63 U/L (ref 20–180)
CO2 SERPL-SCNC: 27 MMOL/L (ref 22–29)
CREAT SERPL-MCNC: 0.93 MG/DL (ref 0.57–1)
DEPRECATED RDW RBC AUTO: 45.7 FL (ref 37–54)
ERYTHROCYTE [DISTWIDTH] IN BLOOD BY AUTOMATED COUNT: 12.7 % (ref 12.3–15.4)
GFR SERPL CREATININE-BSD FRML MDRD: 57 ML/MIN/1.73
GLOBULIN UR ELPH-MCNC: 3 GM/DL
GLUCOSE SERPL-MCNC: 112 MG/DL (ref 65–99)
HBA1C MFR BLD: 6.33 % (ref 4.8–5.6)
HCT VFR BLD AUTO: 39.3 % (ref 34–46.6)
HGB BLD-MCNC: 13.3 G/DL (ref 12–15.9)
MCH RBC QN AUTO: 32.8 PG (ref 26.6–33)
MCHC RBC AUTO-ENTMCNC: 33.8 G/DL (ref 31.5–35.7)
MCV RBC AUTO: 96.8 FL (ref 79–97)
PLATELET # BLD AUTO: 328 10*3/MM3 (ref 140–450)
PMV BLD AUTO: 11.1 FL (ref 6–12)
POTASSIUM SERPL-SCNC: 4.4 MMOL/L (ref 3.5–5.2)
PROT SERPL-MCNC: 7.1 G/DL (ref 6–8.5)
RBC # BLD AUTO: 4.06 10*6/MM3 (ref 3.77–5.28)
SODIUM SERPL-SCNC: 138 MMOL/L (ref 136–145)
T3FREE SERPL-MCNC: 3.39 PG/ML (ref 2–4.4)
T4 FREE SERPL-MCNC: 1.14 NG/DL (ref 0.93–1.7)
TSH SERPL DL<=0.05 MIU/L-ACNC: 4.33 UIU/ML (ref 0.27–4.2)
WBC # BLD AUTO: 6.9 10*3/MM3 (ref 3.4–10.8)

## 2021-10-15 PROCEDURE — 84439 ASSAY OF FREE THYROXINE: CPT | Performed by: INTERNAL MEDICINE

## 2021-10-15 PROCEDURE — 83036 HEMOGLOBIN GLYCOSYLATED A1C: CPT | Performed by: INTERNAL MEDICINE

## 2021-10-15 PROCEDURE — 84443 ASSAY THYROID STIM HORMONE: CPT | Performed by: INTERNAL MEDICINE

## 2021-10-15 PROCEDURE — 84481 FREE ASSAY (FT-3): CPT | Performed by: INTERNAL MEDICINE

## 2021-10-15 PROCEDURE — 80053 COMPREHEN METABOLIC PANEL: CPT | Performed by: INTERNAL MEDICINE

## 2021-10-15 PROCEDURE — 83704 LIPOPROTEIN BLD QUAN PART: CPT | Performed by: INTERNAL MEDICINE

## 2021-10-15 PROCEDURE — 36415 COLL VENOUS BLD VENIPUNCTURE: CPT

## 2021-10-15 PROCEDURE — 80061 LIPID PANEL: CPT | Performed by: INTERNAL MEDICINE

## 2021-10-15 PROCEDURE — 85027 COMPLETE CBC AUTOMATED: CPT | Performed by: INTERNAL MEDICINE

## 2021-10-15 PROCEDURE — 82550 ASSAY OF CK (CPK): CPT | Performed by: INTERNAL MEDICINE

## 2021-10-15 PROCEDURE — 82306 VITAMIN D 25 HYDROXY: CPT | Performed by: INTERNAL MEDICINE

## 2021-10-18 LAB
CHOLEST SERPL-MCNC: 157 MG/DL (ref 100–199)
HDL SERPL-SCNC: 30.3 UMOL/L
HDLC SERPL-MCNC: 56 MG/DL
LDL SERPL QN: 21.1 NM
LDL SERPL-SCNC: 1204 NMOL/L
LDL SMALL SERPL-SCNC: 586 NMOL/L
LDLC SERPL CALC-MCNC: 82 MG/DL (ref 0–99)
TRIGL SERPL-MCNC: 107 MG/DL (ref 0–149)

## 2021-10-27 ENCOUNTER — IMMUNIZATION (OUTPATIENT)
Dept: VACCINE CLINIC | Facility: HOSPITAL | Age: 86
End: 2021-10-27

## 2021-10-27 PROCEDURE — 0004A ADM SARSCOV2 30MCG/0.3ML BOOSTER: CPT | Performed by: INTERNAL MEDICINE

## 2021-10-27 PROCEDURE — 91300 HC SARSCOV02 VAC 30MCG/0.3ML IM: CPT | Performed by: INTERNAL MEDICINE

## 2021-11-03 ENCOUNTER — OFFICE VISIT (OUTPATIENT)
Dept: INTERNAL MEDICINE | Facility: CLINIC | Age: 86
End: 2021-11-03

## 2021-11-03 VITALS
WEIGHT: 158 LBS | HEART RATE: 65 BPM | SYSTOLIC BLOOD PRESSURE: 122 MMHG | DIASTOLIC BLOOD PRESSURE: 60 MMHG | BODY MASS INDEX: 29.08 KG/M2 | RESPIRATION RATE: 17 BRPM | OXYGEN SATURATION: 96 % | HEIGHT: 62 IN

## 2021-11-03 DIAGNOSIS — E03.9 PRIMARY HYPOTHYROIDISM: ICD-10-CM

## 2021-11-03 DIAGNOSIS — E78.2 MIXED HYPERLIPIDEMIA: Chronic | ICD-10-CM

## 2021-11-03 DIAGNOSIS — I65.23 ATHEROSCLEROSIS OF BOTH CAROTID ARTERIES: Chronic | ICD-10-CM

## 2021-11-03 DIAGNOSIS — I10 BENIGN ESSENTIAL HYPERTENSION: Chronic | ICD-10-CM

## 2021-11-03 DIAGNOSIS — E55.9 VITAMIN D DEFICIENCY: Chronic | ICD-10-CM

## 2021-11-03 DIAGNOSIS — Z00.00 ENCOUNTER FOR SUBSEQUENT ANNUAL WELLNESS VISIT (AWV) IN MEDICARE PATIENT: Primary | ICD-10-CM

## 2021-11-03 DIAGNOSIS — M85.89 OSTEOPENIA OF MULTIPLE SITES: Chronic | ICD-10-CM

## 2021-11-03 DIAGNOSIS — E03.8 SUBCLINICAL HYPOTHYROIDISM: Chronic | ICD-10-CM

## 2021-11-03 DIAGNOSIS — R73.01 IMPAIRED FASTING GLUCOSE: Chronic | ICD-10-CM

## 2021-11-03 DIAGNOSIS — Z51.81 THERAPEUTIC DRUG MONITORING: ICD-10-CM

## 2021-11-03 DIAGNOSIS — K58.0 IRRITABLE BOWEL SYNDROME WITH DIARRHEA: Chronic | ICD-10-CM

## 2021-11-03 DIAGNOSIS — K86.9 PANCREATIC LESION: Chronic | ICD-10-CM

## 2021-11-03 DIAGNOSIS — N32.89 IRRITABLE BLADDER: Chronic | ICD-10-CM

## 2021-11-03 DIAGNOSIS — R00.2 HEART PALPITATIONS: Chronic | ICD-10-CM

## 2021-11-03 DIAGNOSIS — N95.1 MENOPAUSAL STATE: Chronic | ICD-10-CM

## 2021-11-03 PROBLEM — L82.0 INFLAMED SEBORRHEIC KERATOSIS: Status: RESOLVED | Noted: 2021-10-13 | Resolved: 2021-11-03

## 2021-11-03 PROCEDURE — 1126F AMNT PAIN NOTED NONE PRSNT: CPT | Performed by: INTERNAL MEDICINE

## 2021-11-03 PROCEDURE — G0439 PPPS, SUBSEQ VISIT: HCPCS | Performed by: INTERNAL MEDICINE

## 2021-11-03 PROCEDURE — 1170F FXNL STATUS ASSESSED: CPT | Performed by: INTERNAL MEDICINE

## 2021-11-03 PROCEDURE — 99214 OFFICE O/P EST MOD 30 MIN: CPT | Performed by: INTERNAL MEDICINE

## 2021-11-03 PROCEDURE — 1160F RVW MEDS BY RX/DR IN RCRD: CPT | Performed by: INTERNAL MEDICINE

## 2021-11-03 RX ORDER — LEVOTHYROXINE SODIUM 0.05 MG/1
TABLET ORAL
Qty: 30 TABLET | Refills: 3 | Status: SHIPPED | OUTPATIENT
Start: 2021-11-03 | End: 2022-04-04 | Stop reason: SDUPTHER

## 2021-11-03 NOTE — PROGRESS NOTES
11/03/2021    Patient Information  Stephanie Hawkins                                                                                          [address]  [facility] [address]      12/16/1932  [unfilled]  There is no work phone number on file.    Chief Complaint:     Medicare wellness visit.  Follow-up blood work in order to monitor chronic medical issues listed in history of present illness.  No new acute complaints.    History of Present Illness:    Patient with several chronic medical problems including impaired fasting glucose, hyperlipidemia, subclinical hypothyroidism, hypertension, carotid artery plaque, history of heart palpitations, IBS-D, vitamin D deficiency, irritable bladder, menopausal state, benign pancreatic lesion.  She presents today for subsequent Medicare wellness visit.  She had lab work in order to monitor chronic medical issues prior to this visit which we will review.  Her past medical history reviewed and updated were necessary including health maintenance parameters.  This reveals she is up-to-date or else accounted for after today's visit.    Review of Systems   Constitutional: Negative.   HENT: Negative.    Eyes: Negative.    Cardiovascular: Negative.    Respiratory: Negative.    Endocrine: Negative.    Hematologic/Lymphatic: Negative.    Skin: Negative.    Musculoskeletal: Negative.    Gastrointestinal: Negative.    Genitourinary: Negative.    Neurological: Negative.    Psychiatric/Behavioral: Negative.    Allergic/Immunologic: Negative.        Active Problems:    Patient Active Problem List   Diagnosis   • Benign essential hypertension   • Carotid artery plaque, 6/13/2019--mild bilateral.  05/30/2017--16-49% left.  Mild plaque right.  05/30/2014--mild bilateral carotid plaque.   • Diverticulosis of colon   • Heart palpitations   • Hyperlipidemia   • Irritable bowel syndrome   • Primary osteoarthritis of left knee   • Osteopenia of multiple sites   • Vitamin D deficiency    • Therapeutic drug monitoring   • Glaucoma, bilateral   • Menopausal state   • Irritable bladder   • Primary hypothyroidism   • Impaired fasting glucose   • Pancreatic lesion, 6/13/2019--dilated pancreatic side duct.  No mass.  No further follow-up.  4 mm, repeat study due June 2019   • Chronic left hip pain         Past Medical History:   Diagnosis Date   • Benign essential hypertension 2/26/2001 02/26/2001--patient was seen as a new patient. She was ordered being treated for hypertension at that time.   • Carotid artery plaque, 6/13/2019--mild bilateral.  05/30/2017--16-49% left.  Mild plaque right.  05/30/2014--mild bilateral carotid plaque. 1/8/2002 June 13, 2019--carotid Doppler study reveals mild bilateral carotid plaque.  No percentage numbers given.  05/30/2017--carotid Doppler study reveals right plaque without stenosis.  There is 16-49% stenosis of the left ICA.  05/30/2014--vascular screen reveals mild bilateral carotid plaque, negative for AAA, negative for PAD.   06/19/2012--vascular screen revealed bilateral, less than 50% internal    • Chronic left hip pain 6/25/2020 June 25, 2020--patient reports that she has had intermittent left hip pain for several years now.  It may or may not be getting worse and we discussed orthopedic referral which we will hold off at the present time.  Instead we will obtain an x-ray and depending upon the result and her clinical course will determine orthopedic referral.   • Diverticulosis of colon 10/16/2001    11/03/2006--the entire examined colon was normal except for scattered diverticuli.   10/16/2001--colonoscopy revealed a few left-sided scattered diverticuli.   • Glaucoma, bilateral 6/1/2016 06/01/2016--routine follow-up.  She reports she was diagnosed with glaucoma about a month or so ago treated with Travatan eyedrops.   • Heart palpitations 10/13/2008    Patient has at a long history of heart palpitations.   10/13/2008--24-hour Holter monitor  reveals an average heart rate of 69, a minimum heart rate was 55. Maximum heart rate was 98. There were no pauses. Ventricular ectopy was zero. Supraventricular ectopy was 5585, with 15 supraventricular runs. Supraventricular bigeminy events were 6 and supraventricular trigeminy events were one. There was no atrial fibrillation/flutter. No ST segment changes. Patient's heart palpitations related to frequent PACs which have been controlled with atenolol.   • Hyperlipidemia 2/26/2001 02/26/2001--patient was seen as a new patient. She was already on cholesterol medication at that time.   • Impaired fasting glucose 6/14/2018 06/14/2018--routine follow-up.  Fasting serum glucose mildly elevated at 102.  Recommend low carbohydrate diet and weight loss.  Exercise.   • Irritable bladder 8/29/2017 08/29/2017--patient seen in follow-up after recent urinary tract infection with complaints of nocturia ×4-5 per night as well as frequency and urgency.  Repeat urinalysis is negative.  Urine culture sent.  Given the fact the patient is had the urinary symptoms prior to the onset of the recent UTI, I will treat her empirically for irritable bladder.  Myrbetriq 25 mg per day.  May increase to 50 mg per day if necessary.  I will have her follow-up in about 3 weeks to reassess his situation.   • Irritable bowel syndrome 4/27/2016   • Menopausal state 5/17/2017   • Osteopenia, 06/20/2018--lumbar spine 0.4.  Right femoral neck -1.1.  Left femoral neck -0.8.  05/30/2014--lumbar spine 0.6.  Right femoral neck -1.2.  Left femoral neck -1.7. 6/9/2006 06/20/2018--DEXA scan reveals average lumbar T score 0.4.  Right femoral neck T score -1.1.  Left femoral neck T score -0.8.  Assessment is osteopenia and compared to previous study in 2016 there is been no change in the right femoral neck and 17.2% improvement in the left femoral neck and a 2.1% decrease in the lumbar spine.  05/30/2014--DEXA scan reveals average lumbar spine T  "score of 0.6. Right femoral neck T score -1.2. Left femoral neck T score -1.7. Osteopenia. Compared to the previous examination of 11/09/2010, there is a 1% decrease in the left hip bone mineral density, 2.1% decrease in the right hip bone density, and a 1.6% improvement in the lumbar spine bone density.   11/22/2010--treatment for osteopenia begun with Fosamax but patient discontinued it on her own.   11/09/2010--DEXA scan revealed average lumbar spine T score 0.5. Left femoral neck T score -1.4. Right femoral neck T score -1.1. Osteopenia.   06/09/2006--DEXA scan reveals lumbar spine T score 0.6. Left hip T score -0.8, left    • Pancreatic lesion, 4 mm, repeat study due June 2019 6/27/2018 11/27/2018--patient seen in follow-up and reports she is having urinary frequency without dysuria, but no incontinence and occasionally has some urgency.  She has no pain at the present time.  Apparently she saw a gynecologist and he did not examine her because \"we do not do Pap smears on a woman year age\".  06/27/2018--patient seen in follow-up and the results of the CT scan discussed.  She lu   • Possible Pelvic congestion syndrome 6/27/2018 11/27/2018--patient seen in follow-up and reports she is having urinary frequency without dysuria, but no incontinence and occasionally has some urgency.  She has no pain at the present time.  Apparently she saw a gynecologist and he did not examine her because \"we do not do Pap smears on a woman year age\".  06/27/2018--patient seen in follow-up and the results of the CT scan discussed.  She lu   • Primary hypothyroidism 12/5/2017    May 28, 2019--routine follow-up.  TSH slightly elevated at 4.56.  Free T3 and free T4 are normal.  Continued monitoring.  12/05/2017--routine follow-up.  TSH elevated at 5.19.  Free T3 and free T4 are normal.  Repeat thyroid function tests ordered.  Thyroid antibodies ordered.  If these confirm diagnosis of hypothyroidism and we will proceed with " thyroid ultrasound.   • Primary osteoarthritis of left knee 3/23/2004    03/23/2004--patient was evaluated by the orthopedist for left knee pain. X-rays revealed osteoarthritis with some medial joint space narrowing, subchondral sclerosis, and patellofemoral spurs. Cortisone injections were given.   • Subclinical hypothyroidism 12/5/2017    May 28, 2019--routine follow-up.  TSH slightly elevated at 4.56.  Free T3 and free T4 are normal.  Continued monitoring.  12/05/2017--routine follow-up.  TSH elevated at 5.19.  Free T3 and free T4 are normal.  Repeat thyroid function tests ordered.  Thyroid antibodies ordered.  If these confirm diagnosis of hypothyroidism and we will proceed with thyroid ultrasound.   • Vitamin D deficiency 4/27/2016         Past Surgical History:   Procedure Laterality Date   • CATARACT EXTRACTION Right 01/2014 January 2014--cataract extirpation and intraocular lens implantation right eye.    • CATARACT EXTRACTION Left 09/2010 September 2010--cataract extirpation and intraocular lens implantation in the left eye.   • COLONOSCOPY  11/03/2006 11/03/2006--the entire examined colon was normal except for scattered diverticuli.    • COLONOSCOPY  10/16/2001    10/16/2001--colonoscopy revealed a few left-sided scattered diverticuli.         Allergies   Allergen Reactions   • Neomycin-Bacitracin Zn-Polymyx Swelling     Neosporin   • Sulfa Antibiotics Hives           Current Outpatient Medications:   •  amLODIPine-benazepril (LOTREL) 5-40 MG per capsule, TAKE 1 CAPSULE DAILY, Disp: 90 capsule, Rfl: 3  •  aspirin 81 MG tablet, Take  by mouth., Disp: , Rfl:   •  atorvastatin (LIPITOR) 40 MG tablet, Take 1 p.o. daily for high cholesterol, Disp: 90 tablet, Rfl: 3  •  Cholecalciferol (VITAMIN D3) 5000 UNITS capsule capsule, 1 by mouth daily as directed, Disp: 30 capsule, Rfl:   •  Garlic 1000 MG capsule, Take  by mouth., Disp: , Rfl:   •  hydroCHLOROthiazide (HYDRODIURIL) 25 MG tablet, Take 1 p.o.  "daily for high blood pressure, Disp: 90 tablet, Rfl: 3  •  metoprolol succinate XL (TOPROL-XL) 25 MG 24 hr tablet, Take 1 p.o. every morning for high blood pressure and heart palpitations, Disp: 90 tablet, Rfl: 3  •  Multiple Vitamin (MULTI VITAMIN DAILY PO), Take  by mouth daily., Disp: , Rfl:   •  Multiple Vitamins-Minerals (PRESERVISION AREDS PO), Take  by mouth Every Night., Disp: , Rfl:   •  timolol (TIMOPTIC) 0.5 % ophthalmic solution, Apply as directed to the affected eye for glaucoma, Disp:  , Rfl:   •  tolterodine LA (Detrol LA) 2 MG 24 hr capsule, Take 1 p.o. daily for irritable bladder, Disp: 90 capsule, Rfl: 3      Family History   Problem Relation Age of Onset   • Cirrhosis Mother         Of Liver. Mother  at age 62 from alcoholic cirrhosis.   • Heart attack Father         Acute Myocardial Infarction.  Father  of a myocardial infarction at age 68.         Social History     Socioeconomic History   • Marital status:      Spouse name: Az   • Number of children: 3   • Highest education level: Bachelor's degree (e.g., BA, AB, BS)   Tobacco Use   • Smoking status: Never Smoker   • Smokeless tobacco: Never Used   Vaping Use   • Vaping Use: Never used   Substance and Sexual Activity   • Alcohol use: No   • Drug use: No   • Sexual activity: Not Currently     Partners: Male         Vitals:    21 1419   BP: 122/60   Pulse: 65   Resp: 17   SpO2: 96%   Weight: 71.7 kg (158 lb)   Height: 157.5 cm (62\")        Body mass index is 28.9 kg/m².      Physical Exam:    General: Alert and oriented x 3.  No acute distress.  Mildly overweight.  Normal affect.  HEENT: Pupils equal, round, reactive to light; extraocular movements intact; sclerae nonicteric; pharynx, ear canals and TMs normal.  Neck: Without JVD, thyromegaly, bruit, or adenopathy.  Lungs: Clear to auscultation in all fields.  Heart: Regular rate and rhythm without murmur, rub, gallop, or click.  Abdomen: Soft, nontender, without " hepatosplenomegaly or hernia.  Bowel sounds normal.  : Deferred.  Rectal: Deferred.  Extremities: Without clubbing, cyanosis, edema, or pulse deficit.  Neurologic: Intact without focal deficit.  Normal station and gait observed during ingress and egress from the examination room.  Skin: Without significant lesion.  Musculoskeletal: Unremarkable.    Lab/other results:    NMR reveals a total cholesterol 157.  Triglycerides 107.  LDL particle number elevated 1204.  Small LDL particle number elevated at 586.  HDL particle number is low at 30.3.  CMP normal except glucose 112.  Estimated GFR is 57.  Hemoglobin A1c 6.33.  Vitamin D normal.  TSH is mildly elevated at 4.33.  Free T3 and free T4 are normal.  CBC is normal.  CPK normal.    Assessment/Plan:     Diagnosis Plan   1. Encounter for subsequent annual wellness visit (AWV) in Medicare patient     2. Impaired fasting glucose     3. Hyperlipidemia  CK   4. Subclinical hypothyroidism     5. Benign essential hypertension     6. Carotid artery plaque, 6/13/2019--mild bilateral.  05/30/2017--16-49% left.  Mild plaque right.  05/30/2014--mild bilateral carotid plaque.     7. Heart palpitations     8. Irritable bowel syndrome with diarrhea     9. Vitamin D deficiency     10. Irritable bladder     11. Menopausal state     12. Pancreatic lesion, 6/13/2019--dilated pancreatic side duct.  No mass.  No further follow-up.  4 mm, repeat study due June 2019     13. Therapeutic drug monitoring  CBC (No Diff)   14. Osteopenia of multiple sites     15. Primary hypothyroidism       The subsequent Medicare wellness visit is documented on separate note.    Patient has impaired fasting glucose which has been mild although it is approaching mild overt diabetes.  I have strongly recommended she follow a low carbohydrate diet and attain ideal body weight.  She also should start exercising.  She has hyperlipidemia which is under good control.  Her blood pressure appears to be under good  control.  Her TSH is elevated consistent with the diagnosis of subclinical hypothyroidism.  Is not quite bad enough to warrant thyroid supplementation although if we did so this might have a positive impact on her weight loss of blood sugar.  See below.  Patient has carotid artery plaque and is due a carotid Doppler study which has been ordered.  She also has osteopenia of multiple sites and I reviewed her latest DEXA scan which was more than 2 years old.  Repeat study has been ordered.  The pancreatic lesion is likely a dilated side duct  of the pancreas.  No further imaging is required according the radiologist.    Plan is as follows: Strongly recommend low carbohydrate diet, exercise, and weight loss.  DEXA scan and carotid study has already been ordered previously.  I will have patient follow-up in 6 months with lab prior to reassess the situation.  Hopefully her hemoglobin A1c will have dropped and she will have lost some weight.  I will go ahead and reorder the DEXA scan and carotid Doppler study.  This was ordered back in April but never done.  I am not sure if the order is still good.    Addendum: After further consideration I have decided to go ahead and initiate generic Synthroid 50 mcg/day.  I will have patient follow-up in about 2 months with nonfasting lab work prior to reassess the situation.    Procedures

## 2021-11-03 NOTE — PROGRESS NOTES
The ABCs of the Annual Wellness Visit  Subsequent Medicare Wellness Visit    No chief complaint on file.     Subjective    History of Present Illness:  Stephanie Hawkins is a 88 y.o. female who presents for a Subsequent Medicare Wellness Visit.    The following portions of the patient's history were reviewed and   updated as appropriate: allergies, current medications, past family history, past medical history, past social history, past surgical history and problem list.    Compared to one year ago, the patient feels her physical   health is the same.    Compared to one year ago, the patient feels her mental   health is the same.    Recent Hospitalizations:  She was not admitted to the hospital during the last year.       Current Medical Providers:  Patient Care Team:  Jeovany Dietrich MD as PCP - General (Internal Medicine)    Outpatient Medications Prior to Visit   Medication Sig Dispense Refill   • amLODIPine-benazepril (LOTREL) 5-40 MG per capsule TAKE 1 CAPSULE DAILY 90 capsule 3   • aspirin 81 MG tablet Take  by mouth.     • atorvastatin (LIPITOR) 40 MG tablet Take 1 p.o. daily for high cholesterol 90 tablet 3   • Cholecalciferol (VITAMIN D3) 5000 UNITS capsule capsule 1 by mouth daily as directed 30 capsule    • Garlic 1000 MG capsule Take  by mouth.     • hydroCHLOROthiazide (HYDRODIURIL) 25 MG tablet Take 1 p.o. daily for high blood pressure 90 tablet 3   • metoprolol succinate XL (TOPROL-XL) 25 MG 24 hr tablet Take 1 p.o. every morning for high blood pressure and heart palpitations 90 tablet 3   • Multiple Vitamin (MULTI VITAMIN DAILY PO) Take  by mouth daily.     • Multiple Vitamins-Minerals (PRESERVISION AREDS PO) Take  by mouth Every Night.     • timolol (TIMOPTIC) 0.5 % ophthalmic solution Apply as directed to the affected eye for glaucoma     • tolterodine LA (Detrol LA) 2 MG 24 hr capsule Take 1 p.o. daily for irritable bladder 90 capsule 3     No facility-administered medications prior to visit.  "      No opioid medication identified on active medication list. I have reviewed chart for other potential  high risk medication/s and harmful drug interactions in the elderly.          Aspirin is on active medication list. Aspirin use is indicated based on review of current medical condition/s. Pros and cons of this therapy have been discussed today. Benefits of this medication outweigh potential harm.  Patient has been encouraged to continue taking this medication.  .      Patient Active Problem List   Diagnosis   • Benign essential hypertension   • Carotid artery plaque, 6/13/2019--mild bilateral.  05/30/2017--16-49% left.  Mild plaque right.  05/30/2014--mild bilateral carotid plaque.   • Diverticulosis of colon   • Heart palpitations   • Hyperlipidemia   • Irritable bowel syndrome   • Primary osteoarthritis of left knee   • Osteopenia of multiple sites   • Vitamin D deficiency   • Therapeutic drug monitoring   • Glaucoma, bilateral   • Menopausal state   • Irritable bladder   • Primary hypothyroidism   • Impaired fasting glucose   • Pancreatic lesion, 6/13/2019--dilated pancreatic side duct.  No mass.  No further follow-up.  4 mm, repeat study due June 2019   • Chronic left hip pain     Advance Care Planning  Advance Directive is not on file.  ACP discussion was held with the patient during this visit. Patient has an advance directive (not in EMR), copy requested.    Review of Systems   Constitutional: Negative.    HENT: Negative.    Eyes: Negative.    Respiratory: Negative.    Cardiovascular: Negative.    Gastrointestinal: Negative.    Genitourinary: Negative.    Musculoskeletal: Positive for arthralgias.   Skin: Negative.    Allergic/Immunologic: Negative.    Neurological: Negative.    Hematological: Negative.    Psychiatric/Behavioral: Negative.         Objective    Vitals:    11/03/21 1419   BP: 122/60   Pulse: 65   Resp: 17   SpO2: 96%   Weight: 71.7 kg (158 lb)   Height: 157.5 cm (62\")   PainSc: 0-No pain "     BMI Readings from Last 1 Encounters:   11/03/21 28.90 kg/m²   BMI is above normal parameters. Recommendations include: exercise counseling and nutrition counseling    Does the patient have evidence of cognitive impairment? No    Physical Exam     General: Alert and oriented x 3.  No acute distress.  Mildly overweight.  Normal affect.  HEENT: Pupils equal, round, reactive to light; extraocular movements intact; sclerae nonicteric; pharynx, ear canals and TMs normal.  Neck: Without JVD, thyromegaly, bruit, or adenopathy.  Lungs: Clear to auscultation in all fields.  Heart: Regular rate and rhythm without murmur, rub, gallop, or click.  Abdomen: Soft, nontender, without hepatosplenomegaly or hernia.  Bowel sounds normal.  : Deferred.  Rectal: Deferred.  Extremities: Without clubbing, cyanosis, edema, or pulse deficit.  Neurologic: Intact without focal deficit.  Normal station and gait observed during ingress and egress from the examination room.  Skin: Without significant lesion.  Musculoskeletal: Unremarkable.    Lab Results   Component Value Date    CHLPL 157 10/15/2021    TRIG 107 10/15/2021    HGBA1C 6.33 (H) 10/15/2021            HEALTH RISK ASSESSMENT    Smoking Status:  Social History     Tobacco Use   Smoking Status Never Smoker   Smokeless Tobacco Never Used     Alcohol Consumption:  Social History     Substance and Sexual Activity   Alcohol Use No     Fall Risk Screen:    MARIO Fall Risk Assessment was completed, and patient is at LOW risk for falls.Assessment completed on:4/8/2021    Depression Screening:  PHQ-2/PHQ-9 Depression Screening 4/8/2021   Little interest or pleasure in doing things 0   Feeling down, depressed, or hopeless 0   Trouble falling or staying asleep, or sleeping too much -   Feeling tired or having little energy -   Poor appetite or overeating -   Feeling bad about yourself - or that you are a failure or have let yourself or your family down -   Trouble concentrating on things,  such as reading the newspaper or watching television -   Moving or speaking so slowly that other people could have noticed. Or the opposite - being so fidgety or restless that you have been moving around a lot more than usual -   Thoughts that you would be better off dead, or of hurting yourself in some way -   Total Score 0   If you checked off any problems, how difficult have these problems made it for you to do your work, take care of things at home, or get along with other people? -       Health Habits and Functional and Cognitive Screening:  Functional & Cognitive Status 11/3/2021   Do you have difficulty preparing food and eating? No   Do you have difficulty bathing yourself, getting dressed or grooming yourself? No   Do you have difficulty using the toilet? No   Do you have difficulty moving around from place to place? No   Do you have trouble with steps or getting out of a bed or a chair? No   Current Diet Well Balanced Diet   Dental Exam Up to date   Eye Exam Up to date   Exercise (times per week) -   Current Exercises Include No Regular Exercise   Current Exercise Activities Include -   Do you need help using the phone?  No   Are you deaf or do you have serious difficulty hearing?  No   Do you need help with transportation? No   Do you need help shopping? No   Do you need help preparing meals?  No   Do you need help with housework?  No   Do you need help with laundry? No   Do you need help taking your medications? No   Do you need help managing money? No   Do you ever drive or ride in a car without wearing a seat belt? No   Have you felt unusual stress, anger or loneliness in the last month? No   Who do you live with? Spouse   If you need help, do you have trouble finding someone available to you? No   Have you been bothered in the last four weeks by sexual problems? No   Do you have difficulty concentrating, remembering or making decisions? No       Age-appropriate Screening Schedule:  Refer to the list  below for future screening recommendations based on patient's age, sex and/or medical conditions. Orders for these recommended tests are listed in the plan section. The patient has been provided with a written plan.    Health Maintenance   Topic Date Due   • DXA SCAN  06/20/2020   • MAMMOGRAM  12/28/2020   • LIPID PANEL  10/15/2022   • TDAP/TD VACCINES (3 - Td or Tdap) 01/10/2030   • INFLUENZA VACCINE  Completed   • ZOSTER VACCINE  Discontinued              Assessment/Plan   CMS Preventative Services Quick Reference  Risk Factors Identified During Encounter  Cardiovascular Disease  Immunizations Discussed/Encouraged (specific Immunizations; COVID19  Obesity/Overweight   The above risks/problems have been discussed with the patient.  Follow up actions/plans if indicated are seen below in the Assessment/Plan Section.  Pertinent information has been shared with the patient in the After Visit Summary.    Diagnoses and all orders for this visit:    1. Encounter for subsequent annual wellness visit (AWV) in Medicare patient (Primary)    2. Impaired fasting glucose  -     Cancel: Comprehensive Metabolic Panel; Future  -     Cancel: Hemoglobin A1c; Future    3. Hyperlipidemia  -     CK; Future  -     Cancel: Comprehensive Metabolic Panel; Future  -     Cancel: NMR LipoProfile; Future    4. Subclinical hypothyroidism  -     Cancel: TSH; Future  -     Cancel: T4, Free; Future  -     Cancel: T3, Free; Future    5. Benign essential hypertension    6. Carotid artery plaque, 6/13/2019--mild bilateral.  05/30/2017--16-49% left.  Mild plaque right.  05/30/2014--mild bilateral carotid plaque.  -     Cancel: Duplex Carotid Ultrasound CAR    7. Heart palpitations    8. Irritable bowel syndrome with diarrhea    9. Vitamin D deficiency  -     Cancel: Vitamin D 25 Hydroxy; Future    10. Irritable bladder    11. Menopausal state  -     Cancel: DEXA Bone Density Axial    12. Pancreatic lesion, 6/13/2019--dilated pancreatic side duct.  No  mass.  No further follow-up.  4 mm, repeat study due June 2019    13. Therapeutic drug monitoring  -     CBC (No Diff); Future    14. Osteopenia of multiple sites  -     Cancel: DEXA Bone Density Axial    15. Primary hypothyroidism  -     levothyroxine (Synthroid) 50 MCG tablet; Take 1 p.o. every morning for low thyroid.  Dispense: 30 tablet; Refill: 3  -     TSH; Future  -     T4, Free; Future  -     T3, Free; Future        Follow Up:   Return in about 2 months (around 1/3/2022) for Next scheduled follow up with lab prior.     An After Visit Summary and PPPS were made available to the patient.

## 2021-12-29 ENCOUNTER — TELEPHONE (OUTPATIENT)
Dept: INTERNAL MEDICINE | Facility: CLINIC | Age: 86
End: 2021-12-29

## 2021-12-29 NOTE — TELEPHONE ENCOUNTER
Caller: Stephanie Hawkins    Relationship: Self    Best call back number: [phone]     What medication are you requesting: ANTIBIOTIC     What are your current symptoms: COUGH / SINUS PRESSURE     How long have you been experiencing symptoms: TWO DAYS AGO    Have you had these symptoms before:    [] Yes  [] No    Have you been treated for these symptoms before:   [] Yes  [] No    If a prescription is needed, what is your preferred pharmacy and phone number: [address] [phone] [facility] [phone]      Additional notes: PATIENT CALLING STATING SHE WENT TO THE  THEY ONLY GAVE HER SOMETHING FOR A COUGH SHE BELIEVES SHE HAS A SINUS INFECTION

## 2022-03-03 DIAGNOSIS — R00.2 HEART PALPITATIONS: Chronic | ICD-10-CM

## 2022-03-03 DIAGNOSIS — I10 BENIGN ESSENTIAL HYPERTENSION: Chronic | ICD-10-CM

## 2022-03-03 RX ORDER — METOPROLOL SUCCINATE 25 MG/1
TABLET, EXTENDED RELEASE ORAL
Qty: 90 TABLET | Refills: 3 | Status: SHIPPED | OUTPATIENT
Start: 2022-03-03 | End: 2023-02-27

## 2022-03-09 ENCOUNTER — TELEPHONE (OUTPATIENT)
Dept: INTERNAL MEDICINE | Facility: CLINIC | Age: 87
End: 2022-03-09

## 2022-03-09 DIAGNOSIS — E55.9 VITAMIN D DEFICIENCY: ICD-10-CM

## 2022-03-09 DIAGNOSIS — R73.01 IMPAIRED FASTING GLUCOSE: ICD-10-CM

## 2022-03-09 DIAGNOSIS — E78.2 MIXED HYPERLIPIDEMIA: Primary | ICD-10-CM

## 2022-03-09 NOTE — TELEPHONE ENCOUNTER
Caller: Stephanie Hawkins    Relationship: Self    Best call back number: [phone]    What orders are you requesting (i.e. lab or imaging): ROUTINE LABS    In what timeframe would the patient need to come in: PRIOR TO APPOINTMENT ON 3/25/22    Where will you receive your lab/imaging services: [place]    Additional notes: PLEASE CALL WHEN ORDERS ARE PLACED

## 2022-03-24 DIAGNOSIS — E03.9 PRIMARY HYPOTHYROIDISM: ICD-10-CM

## 2022-03-24 DIAGNOSIS — E78.2 MIXED HYPERLIPIDEMIA: Chronic | ICD-10-CM

## 2022-03-24 DIAGNOSIS — Z51.81 THERAPEUTIC DRUG MONITORING: ICD-10-CM

## 2022-03-24 DIAGNOSIS — E55.9 VITAMIN D DEFICIENCY: ICD-10-CM

## 2022-03-24 DIAGNOSIS — R73.01 IMPAIRED FASTING GLUCOSE: ICD-10-CM

## 2022-03-25 ENCOUNTER — LAB (OUTPATIENT)
Dept: LAB | Facility: HOSPITAL | Age: 87
End: 2022-03-25

## 2022-03-25 LAB
25(OH)D3 SERPL-MCNC: 38.4 NG/ML (ref 30–100)
ALBUMIN SERPL-MCNC: 4.2 G/DL (ref 3.5–5.2)
ALBUMIN/GLOB SERPL: 1.4 G/DL
ALP SERPL-CCNC: 99 U/L (ref 39–117)
ALT SERPL W P-5'-P-CCNC: 27 U/L (ref 1–33)
ANION GAP SERPL CALCULATED.3IONS-SCNC: 9 MMOL/L (ref 5–15)
AST SERPL-CCNC: 25 U/L (ref 1–32)
BILIRUB SERPL-MCNC: 0.5 MG/DL (ref 0–1.2)
BUN SERPL-MCNC: 17 MG/DL (ref 8–23)
BUN/CREAT SERPL: 18.9 (ref 7–25)
CALCIUM SPEC-SCNC: 9.4 MG/DL (ref 8.6–10.5)
CHLORIDE SERPL-SCNC: 98 MMOL/L (ref 98–107)
CK SERPL-CCNC: 64 U/L (ref 20–180)
CO2 SERPL-SCNC: 29 MMOL/L (ref 22–29)
CREAT SERPL-MCNC: 0.9 MG/DL (ref 0.57–1)
DEPRECATED RDW RBC AUTO: 44 FL (ref 37–54)
EGFRCR SERPLBLD CKD-EPI 2021: 61.2 ML/MIN/1.73
ERYTHROCYTE [DISTWIDTH] IN BLOOD BY AUTOMATED COUNT: 13 % (ref 12.3–15.4)
GLOBULIN UR ELPH-MCNC: 3.1 GM/DL
GLUCOSE SERPL-MCNC: 107 MG/DL (ref 65–99)
HBA1C MFR BLD: 6.5 % (ref 4.8–5.6)
HCT VFR BLD AUTO: 40 % (ref 34–46.6)
HGB BLD-MCNC: 13.6 G/DL (ref 12–15.9)
MCH RBC QN AUTO: 31.7 PG (ref 26.6–33)
MCHC RBC AUTO-ENTMCNC: 34 G/DL (ref 31.5–35.7)
MCV RBC AUTO: 93.2 FL (ref 79–97)
PLATELET # BLD AUTO: 332 10*3/MM3 (ref 140–450)
PMV BLD AUTO: 11.2 FL (ref 6–12)
POTASSIUM SERPL-SCNC: 4 MMOL/L (ref 3.5–5.2)
PROT SERPL-MCNC: 7.3 G/DL (ref 6–8.5)
RBC # BLD AUTO: 4.29 10*6/MM3 (ref 3.77–5.28)
SODIUM SERPL-SCNC: 136 MMOL/L (ref 136–145)
T3FREE SERPL-MCNC: 3.26 PG/ML (ref 2–4.4)
T4 FREE SERPL-MCNC: 1.03 NG/DL (ref 0.93–1.7)
TSH SERPL DL<=0.05 MIU/L-ACNC: 5.64 UIU/ML (ref 0.27–4.2)
WBC NRBC COR # BLD: 8.62 10*3/MM3 (ref 3.4–10.8)

## 2022-03-25 PROCEDURE — 83036 HEMOGLOBIN GLYCOSYLATED A1C: CPT | Performed by: INTERNAL MEDICINE

## 2022-03-25 PROCEDURE — 82306 VITAMIN D 25 HYDROXY: CPT | Performed by: INTERNAL MEDICINE

## 2022-03-25 PROCEDURE — 84481 FREE ASSAY (FT-3): CPT | Performed by: INTERNAL MEDICINE

## 2022-03-25 PROCEDURE — 83704 LIPOPROTEIN BLD QUAN PART: CPT | Performed by: INTERNAL MEDICINE

## 2022-03-25 PROCEDURE — 80061 LIPID PANEL: CPT | Performed by: INTERNAL MEDICINE

## 2022-03-25 PROCEDURE — 82550 ASSAY OF CK (CPK): CPT | Performed by: INTERNAL MEDICINE

## 2022-03-25 PROCEDURE — 85027 COMPLETE CBC AUTOMATED: CPT | Performed by: INTERNAL MEDICINE

## 2022-03-25 PROCEDURE — 36415 COLL VENOUS BLD VENIPUNCTURE: CPT

## 2022-03-25 PROCEDURE — 84443 ASSAY THYROID STIM HORMONE: CPT | Performed by: INTERNAL MEDICINE

## 2022-03-25 PROCEDURE — 84439 ASSAY OF FREE THYROXINE: CPT | Performed by: INTERNAL MEDICINE

## 2022-03-25 PROCEDURE — 80053 COMPREHEN METABOLIC PANEL: CPT | Performed by: INTERNAL MEDICINE

## 2022-03-28 LAB
CHOLEST SERPL-MCNC: 168 MG/DL (ref 100–199)
HDL SERPL-SCNC: 37.2 UMOL/L
HDLC SERPL-MCNC: 52 MG/DL
LDL SERPL QN: 21.1 NM
LDL SERPL-SCNC: 926 NMOL/L
LDL SMALL SERPL-SCNC: 425 NMOL/L
LDLC SERPL CALC-MCNC: 90 MG/DL (ref 0–99)
TRIGL SERPL-MCNC: 149 MG/DL (ref 0–149)

## 2022-04-04 ENCOUNTER — OFFICE VISIT (OUTPATIENT)
Dept: INTERNAL MEDICINE | Facility: CLINIC | Age: 87
End: 2022-04-04

## 2022-04-04 VITALS
WEIGHT: 153.4 LBS | HEART RATE: 60 BPM | OXYGEN SATURATION: 98 % | SYSTOLIC BLOOD PRESSURE: 122 MMHG | RESPIRATION RATE: 16 BRPM | HEIGHT: 62 IN | DIASTOLIC BLOOD PRESSURE: 65 MMHG | BODY MASS INDEX: 28.23 KG/M2

## 2022-04-04 DIAGNOSIS — I65.23 ATHEROSCLEROSIS OF BOTH CAROTID ARTERIES: Chronic | ICD-10-CM

## 2022-04-04 DIAGNOSIS — M85.89 OSTEOPENIA OF MULTIPLE SITES: Chronic | ICD-10-CM

## 2022-04-04 DIAGNOSIS — E78.2 MIXED HYPERLIPIDEMIA: Chronic | ICD-10-CM

## 2022-04-04 DIAGNOSIS — Z51.81 THERAPEUTIC DRUG MONITORING: ICD-10-CM

## 2022-04-04 DIAGNOSIS — R73.01 IMPAIRED FASTING GLUCOSE: Primary | Chronic | ICD-10-CM

## 2022-04-04 DIAGNOSIS — I10 BENIGN ESSENTIAL HYPERTENSION: Chronic | ICD-10-CM

## 2022-04-04 DIAGNOSIS — E03.9 PRIMARY HYPOTHYROIDISM: Chronic | ICD-10-CM

## 2022-04-04 DIAGNOSIS — N95.1 MENOPAUSAL STATE: Chronic | ICD-10-CM

## 2022-04-04 DIAGNOSIS — E55.9 VITAMIN D DEFICIENCY: Chronic | ICD-10-CM

## 2022-04-04 PROCEDURE — 99214 OFFICE O/P EST MOD 30 MIN: CPT | Performed by: INTERNAL MEDICINE

## 2022-04-04 RX ORDER — LEVOTHYROXINE SODIUM 0.05 MG/1
TABLET ORAL
Qty: 30 TABLET | Refills: 3 | Status: SHIPPED | OUTPATIENT
Start: 2022-04-04 | End: 2022-05-31 | Stop reason: SDUPTHER

## 2022-04-04 NOTE — PROGRESS NOTES
04/04/2022    Patient Information  Stephanie Hawkins                                                                                          [id number]  [facility] [address]      12/16/1932  [unfilled]  There is no work phone number on file.    Chief Complaint:     Follow-up blood work in order to monitor chronic medical issues listed in history of present illness.  No new acute complaints.    History of Present Illness:    Patient with impaired fasting glucose, hypothyroidism, hyperlipidemia, hypertension, carotid artery plaque, vitamin D deficiency.  She presents today for follow-up with lab prior in order to monitor chronic medical issues.  Her past medical history reviewed and updated were necessary including health maintenance parameters.  This reveals she is up-to-date or else accounted for.    Review of Systems   Constitutional: Negative.   HENT: Negative.    Eyes: Negative.    Cardiovascular: Negative.    Respiratory: Negative.    Endocrine: Negative.    Hematologic/Lymphatic: Negative.    Skin: Negative.    Musculoskeletal: Negative.    Gastrointestinal: Negative.    Genitourinary: Negative.    Neurological: Negative.    Psychiatric/Behavioral: Negative.    Allergic/Immunologic: Negative.        Active Problems:    Patient Active Problem List   Diagnosis   • Benign essential hypertension   • Carotid artery plaque, 6/13/2019--mild bilateral.  05/30/2017--16-49% left.  Mild plaque right.  05/30/2014--mild bilateral carotid plaque.   • Diverticulosis of colon   • Heart palpitations   • Hyperlipidemia   • Irritable bowel syndrome   • Primary osteoarthritis of left knee   • Osteopenia of multiple sites   • Vitamin D deficiency   • Therapeutic drug monitoring   • Glaucoma, bilateral   • Menopausal state   • Irritable bladder   • Primary hypothyroidism   • Impaired fasting glucose   • Pancreatic lesion, 6/13/2019--dilated pancreatic side duct.  No mass.  No further follow-up.  4 mm, repeat study  due June 2019   • Chronic left hip pain         Past Medical History:   Diagnosis Date   • Benign essential hypertension 2/26/2001 02/26/2001--patient was seen as a new patient. She was ordered being treated for hypertension at that time.   • Carotid artery plaque, 6/13/2019--mild bilateral.  05/30/2017--16-49% left.  Mild plaque right.  05/30/2014--mild bilateral carotid plaque. 1/8/2002 June 13, 2019--carotid Doppler study reveals mild bilateral carotid plaque.  No percentage numbers given.  05/30/2017--carotid Doppler study reveals right plaque without stenosis.  There is 16-49% stenosis of the left ICA.  05/30/2014--vascular screen reveals mild bilateral carotid plaque, negative for AAA, negative for PAD.   06/19/2012--vascular screen revealed bilateral, less than 50% internal    • Chronic left hip pain 6/25/2020 June 25, 2020--patient reports that she has had intermittent left hip pain for several years now.  It may or may not be getting worse and we discussed orthopedic referral which we will hold off at the present time.  Instead we will obtain an x-ray and depending upon the result and her clinical course will determine orthopedic referral.   • Diverticulosis of colon 10/16/2001    11/03/2006--the entire examined colon was normal except for scattered diverticuli.   10/16/2001--colonoscopy revealed a few left-sided scattered diverticuli.   • Glaucoma, bilateral 6/1/2016 06/01/2016--routine follow-up.  She reports she was diagnosed with glaucoma about a month or so ago treated with Travatan eyedrops.   • Heart palpitations 10/13/2008    Patient has at a long history of heart palpitations.   10/13/2008--24-hour Holter monitor reveals an average heart rate of 69, a minimum heart rate was 55. Maximum heart rate was 98. There were no pauses. Ventricular ectopy was zero. Supraventricular ectopy was 5585, with 15 supraventricular runs. Supraventricular bigeminy events were 6 and supraventricular  trigeminy events were one. There was no atrial fibrillation/flutter. No ST segment changes. Patient's heart palpitations related to frequent PACs which have been controlled with atenolol.   • Hyperlipidemia 2/26/2001 02/26/2001--patient was seen as a new patient. She was already on cholesterol medication at that time.   • Impaired fasting glucose 6/14/2018 06/14/2018--routine follow-up.  Fasting serum glucose mildly elevated at 102.  Recommend low carbohydrate diet and weight loss.  Exercise.   • Irritable bladder 8/29/2017 08/29/2017--patient seen in follow-up after recent urinary tract infection with complaints of nocturia ×4-5 per night as well as frequency and urgency.  Repeat urinalysis is negative.  Urine culture sent.  Given the fact the patient is had the urinary symptoms prior to the onset of the recent UTI, I will treat her empirically for irritable bladder.  Myrbetriq 25 mg per day.  May increase to 50 mg per day if necessary.  I will have her follow-up in about 3 weeks to reassess his situation.   • Irritable bowel syndrome 4/27/2016   • Menopausal state 5/17/2017   • Osteopenia, 06/20/2018--lumbar spine 0.4.  Right femoral neck -1.1.  Left femoral neck -0.8.  05/30/2014--lumbar spine 0.6.  Right femoral neck -1.2.  Left femoral neck -1.7. 6/9/2006 06/20/2018--DEXA scan reveals average lumbar T score 0.4.  Right femoral neck T score -1.1.  Left femoral neck T score -0.8.  Assessment is osteopenia and compared to previous study in 2016 there is been no change in the right femoral neck and 17.2% improvement in the left femoral neck and a 2.1% decrease in the lumbar spine.  05/30/2014--DEXA scan reveals average lumbar spine T score of 0.6. Right femoral neck T score -1.2. Left femoral neck T score -1.7. Osteopenia. Compared to the previous examination of 11/09/2010, there is a 1% decrease in the left hip bone mineral density, 2.1% decrease in the right hip bone density, and a 1.6% improvement in  "the lumbar spine bone density.   11/22/2010--treatment for osteopenia begun with Fosamax but patient discontinued it on her own.   11/09/2010--DEXA scan revealed average lumbar spine T score 0.5. Left femoral neck T score -1.4. Right femoral neck T score -1.1. Osteopenia.   06/09/2006--DEXA scan reveals lumbar spine T score 0.6. Left hip T score -0.8, left    • Pancreatic lesion, 4 mm, repeat study due June 2019 6/27/2018 11/27/2018--patient seen in follow-up and reports she is having urinary frequency without dysuria, but no incontinence and occasionally has some urgency.  She has no pain at the present time.  Apparently she saw a gynecologist and he did not examine her because \"we do not do Pap smears on a woman year age\".  06/27/2018--patient seen in follow-up and the results of the CT scan discussed.  She lu   • Possible Pelvic congestion syndrome 6/27/2018 11/27/2018--patient seen in follow-up and reports she is having urinary frequency without dysuria, but no incontinence and occasionally has some urgency.  She has no pain at the present time.  Apparently she saw a gynecologist and he did not examine her because \"we do not do Pap smears on a woman year age\".  06/27/2018--patient seen in follow-up and the results of the CT scan discussed.  She lu   • Primary hypothyroidism 12/5/2017    May 28, 2019--routine follow-up.  TSH slightly elevated at 4.56.  Free T3 and free T4 are normal.  Continued monitoring.  12/05/2017--routine follow-up.  TSH elevated at 5.19.  Free T3 and free T4 are normal.  Repeat thyroid function tests ordered.  Thyroid antibodies ordered.  If these confirm diagnosis of hypothyroidism and we will proceed with thyroid ultrasound.   • Primary osteoarthritis of left knee 3/23/2004    03/23/2004--patient was evaluated by the orthopedist for left knee pain. X-rays revealed osteoarthritis with some medial joint space narrowing, subchondral sclerosis, and patellofemoral spurs. Cortisone " injections were given.   • Subclinical hypothyroidism 12/5/2017    May 28, 2019--routine follow-up.  TSH slightly elevated at 4.56.  Free T3 and free T4 are normal.  Continued monitoring.  12/05/2017--routine follow-up.  TSH elevated at 5.19.  Free T3 and free T4 are normal.  Repeat thyroid function tests ordered.  Thyroid antibodies ordered.  If these confirm diagnosis of hypothyroidism and we will proceed with thyroid ultrasound.   • Vitamin D deficiency 4/27/2016         Past Surgical History:   Procedure Laterality Date   • CATARACT EXTRACTION Right 01/2014 January 2014--cataract extirpation and intraocular lens implantation right eye.    • CATARACT EXTRACTION Left 09/2010 September 2010--cataract extirpation and intraocular lens implantation in the left eye.   • COLONOSCOPY  11/03/2006 11/03/2006--the entire examined colon was normal except for scattered diverticuli.    • COLONOSCOPY  10/16/2001    10/16/2001--colonoscopy revealed a few left-sided scattered diverticuli.         Allergies   Allergen Reactions   • Neomycin-Bacitracin Zn-Polymyx Swelling     Neosporin   • Sulfa Antibiotics Hives   • Penicillins Unknown - Low Severity           Current Outpatient Medications:   •  amLODIPine-benazepril (LOTREL) 5-40 MG per capsule, TAKE 1 CAPSULE DAILY, Disp: 90 capsule, Rfl: 3  •  aspirin 81 MG tablet, Take  by mouth., Disp: , Rfl:   •  atorvastatin (LIPITOR) 40 MG tablet, Take 1 p.o. daily for high cholesterol, Disp: 90 tablet, Rfl: 3  •  Cholecalciferol (VITAMIN D3) 5000 UNITS capsule capsule, 1 by mouth daily as directed, Disp: 30 capsule, Rfl:   •  Garlic 1000 MG capsule, Take  by mouth., Disp: , Rfl:   •  hydroCHLOROthiazide (HYDRODIURIL) 25 MG tablet, Take 1 p.o. daily for high blood pressure, Disp: 90 tablet, Rfl: 3  •  levothyroxine (Synthroid) 50 MCG tablet, Take 1 p.o. every morning for low thyroid., Disp: 30 tablet, Rfl: 3  •  metoprolol succinate XL (TOPROL-XL) 25 MG 24 hr tablet, TAKE 1  "TABLET EVERY MORNING FOR HIGH BLOOD PRESSURE, HEART AND PALPITATIONS, Disp: 90 tablet, Rfl: 3  •  Multiple Vitamin (MULTI VITAMIN DAILY PO), Take  by mouth daily., Disp: , Rfl:   •  Multiple Vitamins-Minerals (PRESERVISION AREDS PO), Take  by mouth Every Night., Disp: , Rfl:   •  timolol (TIMOPTIC) 0.5 % ophthalmic solution, Apply as directed to the affected eye for glaucoma, Disp:  , Rfl:   •  tolterodine LA (Detrol LA) 2 MG 24 hr capsule, Take 1 p.o. daily for irritable bladder, Disp: 90 capsule, Rfl: 3  •  benzonatate (TESSALON) 200 MG capsule, Take 1 capsule by mouth 3 (Three) Times a Day As Needed for Cough., Disp: 30 capsule, Rfl: 0  •  fluticasone (FLONASE) 50 MCG/ACT nasal spray, 2 sprays into the nostril(s) as directed by provider Daily., Disp: 16 g, Rfl: 0      Family History   Problem Relation Age of Onset   • Cirrhosis Mother         Of Liver. Mother  at age 62 from alcoholic cirrhosis.   • Heart attack Father         Acute Myocardial Infarction.  Father  of a myocardial infarction at age 68.         Social History     Socioeconomic History   • Marital status:      Spouse name: Az   • Number of children: 3   • Highest education level: Bachelor's degree (e.g., BA, AB, BS)   Tobacco Use   • Smoking status: Never Smoker   • Smokeless tobacco: Never Used   Vaping Use   • Vaping Use: Never used   Substance and Sexual Activity   • Alcohol use: No   • Drug use: No   • Sexual activity: Not Currently     Partners: Male         Vitals:    22 1258   BP: 122/65   BP Location: Right arm   Patient Position: Sitting   Cuff Size: Adult   Pulse: 60   Resp: 16   SpO2: 98%   Weight: 69.6 kg (153 lb 6.4 oz)   Height: 157.5 cm (62\")        Body mass index is 28.06 kg/m².      Physical Exam:    General: Alert and oriented x 3.  No acute distress.  Mildly overweight.  Normal affect.  HEENT: Pupils equal, round, reactive to light; extraocular movements intact; sclerae nonicteric; pharynx, ear canals and " TMs normal.  Neck: Without JVD, thyromegaly, bruit, or adenopathy.  Lungs: Clear to auscultation in all fields.  Heart: Regular rate and rhythm without murmur, rub, gallop, or click.  Abdomen: Soft, nontender, without hepatosplenomegaly or hernia.  Bowel sounds normal.  : Deferred.  Rectal: Deferred.  Extremities: Without clubbing, cyanosis, edema, or pulse deficit.  Neurologic: Intact without focal deficit.  Normal station and gait observed during ingress and egress from the examination room.  Skin: Without significant lesion.  Musculoskeletal: Unremarkable.    Lab/other results:     CBC is normal.  CPK normal.  TSH elevated at 5.6.  Free T3 and free T4 are normal.  Hemoglobin A1c 6.5.  Vitamin D normal at 38.4.  CMP normal except glucose 107.  NMR is absolutely perfect.      Assessment/Plan:     Diagnosis Plan   1. Impaired fasting glucose     2. Primary hypothyroidism  TSH    T3, Free    T4, Free   3. Hyperlipidemia     4. Benign essential hypertension     5. Carotid artery plaque, 6/13/2019--mild bilateral.  05/30/2017--16-49% left.  Mild plaque right.  05/30/2014--mild bilateral carotid plaque.     6. Vitamin D deficiency     7. Therapeutic drug monitoring     8. Menopausal state     9. Osteopenia of multiple sites       It appears that patient's impaired fasting glucose may have evolved into mild overt diabetes but I am reluctant to give patient this diagnosis based on one slightly abnormal hemoglobin A1c reading.  Her primary hypothyroidism was recently treated with medication 50 mcg of levothyroxine per day and her TSH remains subtherapeutic.  Dose adjustment indicated.  Hyperlipidemia is under excellent control on the current regimen.  Her blood pressure is well controlled.  Patient has carotid artery plaque and needs a carotid Doppler study.  Her vitamin D is in normal range on supplementation.  This is important given her menopausal state and osteopenia.  She is up-to-date on her DEXA scan.  Her last CT  scan of the abdomen revealing possible pancreatic lesion was felt to be benign by the radiologist and stable with likely a mildly dilated side duct.  He felt no further work-up indicated.     Plan is as follows: Appears the patient did not receive a prescription for the levothyroxine.  Therefore I am going to send a new prescription and and we will have patient obtain nonfasting lab work in about 6 to 8 weeks and follow-up thereafter to assess primarily thyroid.  We can schedule future lab and follow-up at that time.      Procedures

## 2022-05-04 DIAGNOSIS — I10 BENIGN ESSENTIAL HYPERTENSION: Chronic | ICD-10-CM

## 2022-05-04 DIAGNOSIS — E78.2 MIXED HYPERLIPIDEMIA: Chronic | ICD-10-CM

## 2022-05-04 RX ORDER — ATORVASTATIN CALCIUM 40 MG/1
TABLET, FILM COATED ORAL
Qty: 90 TABLET | Refills: 3 | Status: SHIPPED | OUTPATIENT
Start: 2022-05-04

## 2022-05-04 RX ORDER — HYDROCHLOROTHIAZIDE 25 MG/1
TABLET ORAL
Qty: 90 TABLET | Refills: 3 | Status: SHIPPED | OUTPATIENT
Start: 2022-05-04

## 2022-05-17 ENCOUNTER — LAB (OUTPATIENT)
Dept: LAB | Facility: HOSPITAL | Age: 87
End: 2022-05-17

## 2022-05-17 DIAGNOSIS — E03.9 PRIMARY HYPOTHYROIDISM: Chronic | ICD-10-CM

## 2022-05-17 LAB
T3FREE SERPL-MCNC: 3.11 PG/ML (ref 2–4.4)
T4 FREE SERPL-MCNC: 1.23 NG/DL (ref 0.93–1.7)
TSH SERPL DL<=0.05 MIU/L-ACNC: 2.43 UIU/ML (ref 0.27–4.2)

## 2022-05-17 PROCEDURE — 84439 ASSAY OF FREE THYROXINE: CPT

## 2022-05-17 PROCEDURE — 36415 COLL VENOUS BLD VENIPUNCTURE: CPT

## 2022-05-17 PROCEDURE — 84481 FREE ASSAY (FT-3): CPT

## 2022-05-17 PROCEDURE — 84443 ASSAY THYROID STIM HORMONE: CPT

## 2022-05-20 DIAGNOSIS — I10 BENIGN ESSENTIAL HYPERTENSION: Chronic | ICD-10-CM

## 2022-05-20 RX ORDER — AMLODIPINE BESYLATE AND BENAZEPRIL HYDROCHLORIDE 5; 40 MG/1; MG/1
CAPSULE ORAL
Qty: 90 CAPSULE | Refills: 3 | Status: SHIPPED | OUTPATIENT
Start: 2022-05-20

## 2022-05-31 ENCOUNTER — OFFICE VISIT (OUTPATIENT)
Dept: INTERNAL MEDICINE | Facility: CLINIC | Age: 87
End: 2022-05-31

## 2022-05-31 VITALS
WEIGHT: 148.6 LBS | SYSTOLIC BLOOD PRESSURE: 130 MMHG | BODY MASS INDEX: 27.34 KG/M2 | DIASTOLIC BLOOD PRESSURE: 68 MMHG | OXYGEN SATURATION: 98 % | HEIGHT: 62 IN | RESPIRATION RATE: 18 BRPM | HEART RATE: 68 BPM

## 2022-05-31 DIAGNOSIS — N32.89 IRRITABLE BLADDER: Chronic | ICD-10-CM

## 2022-05-31 DIAGNOSIS — K86.9 PANCREATIC LESION: Chronic | ICD-10-CM

## 2022-05-31 DIAGNOSIS — E78.2 MIXED HYPERLIPIDEMIA: Chronic | ICD-10-CM

## 2022-05-31 DIAGNOSIS — I10 BENIGN ESSENTIAL HYPERTENSION: Chronic | ICD-10-CM

## 2022-05-31 DIAGNOSIS — E55.9 VITAMIN D DEFICIENCY: Chronic | ICD-10-CM

## 2022-05-31 DIAGNOSIS — R73.01 IMPAIRED FASTING GLUCOSE: Chronic | ICD-10-CM

## 2022-05-31 DIAGNOSIS — I65.23 ATHEROSCLEROSIS OF BOTH CAROTID ARTERIES: Chronic | ICD-10-CM

## 2022-05-31 DIAGNOSIS — E03.9 PRIMARY HYPOTHYROIDISM: Primary | Chronic | ICD-10-CM

## 2022-05-31 DIAGNOSIS — Z51.81 THERAPEUTIC DRUG MONITORING: ICD-10-CM

## 2022-05-31 DIAGNOSIS — Z12.31 BREAST CANCER SCREENING BY MAMMOGRAM: ICD-10-CM

## 2022-05-31 DIAGNOSIS — M85.89 OSTEOPENIA OF MULTIPLE SITES: Chronic | ICD-10-CM

## 2022-05-31 DIAGNOSIS — Z78.0 POSTMENOPAUSAL STATE: Chronic | ICD-10-CM

## 2022-05-31 DIAGNOSIS — I65.23 ATHEROSCLEROSIS OF BOTH CAROTID ARTERIES: ICD-10-CM

## 2022-05-31 PROBLEM — N95.1 MENOPAUSAL STATE: Chronic | Status: RESOLVED | Noted: 2017-05-17 | Resolved: 2022-05-31

## 2022-05-31 PROCEDURE — 99214 OFFICE O/P EST MOD 30 MIN: CPT | Performed by: INTERNAL MEDICINE

## 2022-05-31 RX ORDER — LEVOTHYROXINE SODIUM 0.05 MG/1
TABLET ORAL
Qty: 90 TABLET | Refills: 3 | Status: SHIPPED | OUTPATIENT
Start: 2022-05-31 | End: 2022-08-17 | Stop reason: SDUPTHER

## 2022-05-31 NOTE — PROGRESS NOTES
05/31/2022    Patient Information  Stephanie Hawkins                                                                                          [id number]  [facility] [id number]      12/16/1932  [unfilled]  There is no work phone number on file.    Chief Complaint:     Follow-up medical problems as noted in history of present illness.  No new acute complaints.    History of Present Illness:    Patient with primary hypothyroidism who has not been adequately supplemented recently, impaired fasting glucose, hyperlipidemia, hypertension, carotid artery plaque, osteopenia, postmenopausal state, vitamin D deficiency, irritable bladder, stable pancreatic lesion.  She presents today to follow-up with lab prior.  At the last visit she was not euthyroid and we made adjustments to her thyroid medication.  She tolerated this well.  She has hyperlipidemia and takes Lipitor and seems to be tolerating that well.  Her blood pressure is treated with amlodipine/benazepril as well as hydrochlorothiazide 25 mg.  Past medical history reviewed and updated were necessary including health maintenance parameters.  This reveals she needs DEXA scan and a mammogram.  See below.    Review of Systems   Constitutional: Negative.   HENT: Negative.    Eyes: Negative.    Cardiovascular: Negative.    Respiratory: Negative.    Endocrine: Negative.    Hematologic/Lymphatic: Negative.    Skin: Negative.    Musculoskeletal: Negative.    Gastrointestinal: Negative.    Genitourinary: Negative.    Neurological: Negative.    Psychiatric/Behavioral: Negative.    Allergic/Immunologic: Negative.        Active Problems:    Patient Active Problem List   Diagnosis   • Benign essential hypertension   • Carotid artery plaque, 6/13/2019--mild bilateral.  05/30/2017--16-49% left.  Mild plaque right.  05/30/2014--mild bilateral carotid plaque.   • Diverticulosis of colon   • Heart palpitations   • Hyperlipidemia   • Irritable bowel syndrome   • Primary  osteoarthritis of left knee   • Osteopenia of multiple sites   • Vitamin D deficiency   • Therapeutic drug monitoring   • Glaucoma, bilateral   • Irritable bladder   • Primary hypothyroidism   • Impaired fasting glucose   • Pancreatic lesion, 6/13/2019--dilated pancreatic side duct.  No mass.  No further follow-up.  4 mm, repeat study due June 2019   • Chronic left hip pain   • Postmenopausal state         Past Medical History:   Diagnosis Date   • Benign essential hypertension 2/26/2001 02/26/2001--patient was seen as a new patient. She was ordered being treated for hypertension at that time.   • Carotid artery plaque, 6/13/2019--mild bilateral.  05/30/2017--16-49% left.  Mild plaque right.  05/30/2014--mild bilateral carotid plaque. 1/8/2002 June 13, 2019--carotid Doppler study reveals mild bilateral carotid plaque.  No percentage numbers given.  05/30/2017--carotid Doppler study reveals right plaque without stenosis.  There is 16-49% stenosis of the left ICA.  05/30/2014--vascular screen reveals mild bilateral carotid plaque, negative for AAA, negative for PAD.   06/19/2012--vascular screen revealed bilateral, less than 50% internal    • Chronic left hip pain 6/25/2020 June 25, 2020--patient reports that she has had intermittent left hip pain for several years now.  It may or may not be getting worse and we discussed orthopedic referral which we will hold off at the present time.  Instead we will obtain an x-ray and depending upon the result and her clinical course will determine orthopedic referral.   • Diverticulosis of colon 10/16/2001    11/03/2006--the entire examined colon was normal except for scattered diverticuli.   10/16/2001--colonoscopy revealed a few left-sided scattered diverticuli.   • Glaucoma, bilateral 6/1/2016 06/01/2016--routine follow-up.  She reports she was diagnosed with glaucoma about a month or so ago treated with Travatan eyedrops.   • Heart palpitations 10/13/2008     Patient has at a long history of heart palpitations.   10/13/2008--24-hour Holter monitor reveals an average heart rate of 69, a minimum heart rate was 55. Maximum heart rate was 98. There were no pauses. Ventricular ectopy was zero. Supraventricular ectopy was 5585, with 15 supraventricular runs. Supraventricular bigeminy events were 6 and supraventricular trigeminy events were one. There was no atrial fibrillation/flutter. No ST segment changes. Patient's heart palpitations related to frequent PACs which have been controlled with atenolol.   • Hyperlipidemia 2/26/2001 02/26/2001--patient was seen as a new patient. She was already on cholesterol medication at that time.   • Impaired fasting glucose 6/14/2018 06/14/2018--routine follow-up.  Fasting serum glucose mildly elevated at 102.  Recommend low carbohydrate diet and weight loss.  Exercise.   • Irritable bladder 8/29/2017 08/29/2017--patient seen in follow-up after recent urinary tract infection with complaints of nocturia ×4-5 per night as well as frequency and urgency.  Repeat urinalysis is negative.  Urine culture sent.  Given the fact the patient is had the urinary symptoms prior to the onset of the recent UTI, I will treat her empirically for irritable bladder.  Myrbetriq 25 mg per day.  May increase to 50 mg per day if necessary.  I will have her follow-up in about 3 weeks to reassess his situation.   • Irritable bowel syndrome 4/27/2016   • Menopausal state 5/17/2017   • Osteopenia, 06/20/2018--lumbar spine 0.4.  Right femoral neck -1.1.  Left femoral neck -0.8.  05/30/2014--lumbar spine 0.6.  Right femoral neck -1.2.  Left femoral neck -1.7. 6/9/2006 06/20/2018--DEXA scan reveals average lumbar T score 0.4.  Right femoral neck T score -1.1.  Left femoral neck T score -0.8.  Assessment is osteopenia and compared to previous study in 2016 there is been no change in the right femoral neck and 17.2% improvement in the left femoral neck and a  "2.1% decrease in the lumbar spine.  05/30/2014--DEXA scan reveals average lumbar spine T score of 0.6. Right femoral neck T score -1.2. Left femoral neck T score -1.7. Osteopenia. Compared to the previous examination of 11/09/2010, there is a 1% decrease in the left hip bone mineral density, 2.1% decrease in the right hip bone density, and a 1.6% improvement in the lumbar spine bone density.   11/22/2010--treatment for osteopenia begun with Fosamax but patient discontinued it on her own.   11/09/2010--DEXA scan revealed average lumbar spine T score 0.5. Left femoral neck T score -1.4. Right femoral neck T score -1.1. Osteopenia.   06/09/2006--DEXA scan reveals lumbar spine T score 0.6. Left hip T score -0.8, left    • Pancreatic lesion, 4 mm, repeat study due June 2019 6/27/2018 11/27/2018--patient seen in follow-up and reports she is having urinary frequency without dysuria, but no incontinence and occasionally has some urgency.  She has no pain at the present time.  Apparently she saw a gynecologist and he did not examine her because \"we do not do Pap smears on a woman year age\".  06/27/2018--patient seen in follow-up and the results of the CT scan discussed.  Helga leigh   • Possible Pelvic congestion syndrome 6/27/2018 11/27/2018--patient seen in follow-up and reports she is having urinary frequency without dysuria, but no incontinence and occasionally has some urgency.  She has no pain at the present time.  Apparently she saw a gynecologist and he did not examine her because \"we do not do Pap smears on a woman year age\".  06/27/2018--patient seen in follow-up and the results of the CT scan discussed.  She lu   • Primary hypothyroidism 12/5/2017    May 28, 2019--routine follow-up.  TSH slightly elevated at 4.56.  Free T3 and free T4 are normal.  Continued monitoring.  12/05/2017--routine follow-up.  TSH elevated at 5.19.  Free T3 and free T4 are normal.  Repeat thyroid function tests ordered.  Thyroid " antibodies ordered.  If these confirm diagnosis of hypothyroidism and we will proceed with thyroid ultrasound.   • Primary osteoarthritis of left knee 3/23/2004    03/23/2004--patient was evaluated by the orthopedist for left knee pain. X-rays revealed osteoarthritis with some medial joint space narrowing, subchondral sclerosis, and patellofemoral spurs. Cortisone injections were given.   • Subclinical hypothyroidism 12/5/2017    May 28, 2019--routine follow-up.  TSH slightly elevated at 4.56.  Free T3 and free T4 are normal.  Continued monitoring.  12/05/2017--routine follow-up.  TSH elevated at 5.19.  Free T3 and free T4 are normal.  Repeat thyroid function tests ordered.  Thyroid antibodies ordered.  If these confirm diagnosis of hypothyroidism and we will proceed with thyroid ultrasound.   • Vitamin D deficiency 4/27/2016         Past Surgical History:   Procedure Laterality Date   • CATARACT EXTRACTION Right 01/2014 January 2014--cataract extirpation and intraocular lens implantation right eye.    • CATARACT EXTRACTION Left 09/2010 September 2010--cataract extirpation and intraocular lens implantation in the left eye.   • COLONOSCOPY  11/03/2006 11/03/2006--the entire examined colon was normal except for scattered diverticuli.    • COLONOSCOPY  10/16/2001    10/16/2001--colonoscopy revealed a few left-sided scattered diverticuli.         Allergies   Allergen Reactions   • Neomycin-Bacitracin Zn-Polymyx Swelling     Neosporin   • Sulfa Antibiotics Hives   • Penicillins Unknown - Low Severity           Current Outpatient Medications:   •  amLODIPine-benazepril (LOTREL) 5-40 MG per capsule, TAKE 1 CAPSULE DAILY, Disp: 90 capsule, Rfl: 3  •  aspirin 81 MG tablet, Take  by mouth., Disp: , Rfl:   •  atorvastatin (LIPITOR) 40 MG tablet, TAKE 1 TABLET DAILY FOR HIGH CHOLESTEROL, Disp: 90 tablet, Rfl: 3  •  Cholecalciferol (VITAMIN D3) 5000 UNITS capsule capsule, 1 by mouth daily as directed, Disp: 30 capsule,  "Rfl:   •  fluticasone (FLONASE) 50 MCG/ACT nasal spray, 2 sprays into the nostril(s) as directed by provider Daily., Disp: 16 g, Rfl: 0  •  Garlic 1000 MG capsule, Take  by mouth., Disp: , Rfl:   •  hydroCHLOROthiazide (HYDRODIURIL) 25 MG tablet, TAKE 1 TABLET DAILY FOR HIGH BLOOD PRESSURE, Disp: 90 tablet, Rfl: 3  •  levothyroxine (Synthroid) 50 MCG tablet, Take 1 p.o. every morning for low thyroid., Disp: 90 tablet, Rfl: 3  •  metoprolol succinate XL (TOPROL-XL) 25 MG 24 hr tablet, TAKE 1 TABLET EVERY MORNING FOR HIGH BLOOD PRESSURE, HEART AND PALPITATIONS, Disp: 90 tablet, Rfl: 3  •  Multiple Vitamin (MULTI VITAMIN DAILY PO), Take  by mouth daily., Disp: , Rfl:   •  Multiple Vitamins-Minerals (PRESERVISION AREDS PO), Take  by mouth Every Night., Disp: , Rfl:   •  timolol (TIMOPTIC) 0.5 % ophthalmic solution, Apply as directed to the affected eye for glaucoma, Disp:  , Rfl:   •  tolterodine LA (Detrol LA) 2 MG 24 hr capsule, Take 1 p.o. daily for irritable bladder, Disp: 90 capsule, Rfl: 3      Family History   Problem Relation Age of Onset   • Cirrhosis Mother         Of Liver. Mother  at age 62 from alcoholic cirrhosis.   • Heart attack Father         Acute Myocardial Infarction.  Father  of a myocardial infarction at age 68.         Social History     Socioeconomic History   • Marital status:      Spouse name: Az   • Number of children: 3   • Highest education level: Bachelor's degree (e.g., BA, AB, BS)   Tobacco Use   • Smoking status: Never Smoker   • Smokeless tobacco: Never Used   Vaping Use   • Vaping Use: Never used   Substance and Sexual Activity   • Alcohol use: No   • Drug use: No   • Sexual activity: Not Currently     Partners: Male         Vitals:    22 1235   BP: 130/68   Pulse: 68   Resp: 18   SpO2: 98%   Weight: 67.4 kg (148 lb 9.6 oz)   Height: 157.5 cm (62\")        Body mass index is 27.18 kg/m².      Physical Exam:    General: Alert and oriented x 3.  No acute distress. "  Normal affect.  HEENT: Pupils equal, round, reactive to light; extraocular movements intact; sclerae nonicteric; pharynx, ear canals and TMs normal.  Neck: Without JVD, thyromegaly, bruit, or adenopathy.  Lungs: Clear to auscultation in all fields.  Heart: Regular rate and rhythm without murmur, rub, gallop, or click.  Abdomen: Soft, nontender, without hepatosplenomegaly or hernia.  Bowel sounds normal.  : Deferred.  Rectal: Deferred.  Extremities: Without clubbing, cyanosis, edema, or pulse deficit.  Neurologic: Intact without focal deficit.  Normal station and gait observed during ingress and egress from the examination room.  Skin: Without significant lesion.  Musculoskeletal: Unremarkable.    Lab/other results:    Thyroid function tests are now normal with a TSH of 3.43.  Free T3 is normal at 3.1.  Free T4 normal at 1.23.    Assessment/Plan:     Diagnosis Plan   1. Primary hypothyroidism  TSH    T4, Free    T3, Free    levothyroxine (Synthroid) 50 MCG tablet   2. Impaired fasting glucose  Comprehensive Metabolic Panel    Hemoglobin A1c   3. Hyperlipidemia  CK    Comprehensive Metabolic Panel    NMR LipoProfile   4. Benign essential hypertension     5. Carotid artery plaque, 6/13/2019--mild bilateral.  05/30/2017--16-49% left.  Mild plaque right.  05/30/2014--mild bilateral carotid plaque.  Duplex Carotid Ultrasound CAR   6. Osteopenia of multiple sites  DEXA Bone Density Axial   7. Postmenopausal state  DEXA Bone Density Axial   8. Vitamin D deficiency  Vitamin D 25 Hydroxy   9. Irritable bladder     10. Pancreatic lesion, 6/13/2019--dilated pancreatic side duct.  No mass.  No further follow-up.  4 mm, repeat study due June 2019     11. Therapeutic drug monitoring  CBC (No Diff)   12. Breast cancer screening by mammogram  Mammo Screening Bilateral With CAD     Patient has primary hypothyroidism and is now euthyroid on the current dose of 50 mcg of levothyroxine per day.  She has mild impaired fasting glucose  that does not require medication.  Recommend low carbohydrate diet.  Her hyperlipidemia has been under good control with Lipitor.  Her blood pressure is also under very good control on the current regimen.  Patient has carotid artery plaque and needs a carotid Doppler study.  She also has osteopenia and is postmenopausal and needs a DEXA scan.  Vitamin D is treated with supplementation and she will need future lab work to check her vitamin D levels.  Irritable bladder symptoms seem controlled.  The pancreatic lesion is stable and felt to be a dilated pancreatic side.    Plan is as follows: DEXA scan, mammogram, carotid Doppler study ordered.  We will discontinue routine mammogram screenings after this next mammogram.  However, we will continue with the DEXA scans and carotid studies.  No change in current medical regimen.  Recommend low carbohydrate diet.  Patient will follow-up after November 3, 2022 with lab prior and this will also be subsequent Medicare wellness visit.  Otherwise, she will follow-up as needed.    Procedures

## 2022-07-25 ENCOUNTER — HOSPITAL ENCOUNTER (OUTPATIENT)
Dept: CARDIOLOGY | Facility: HOSPITAL | Age: 87
Discharge: HOME | End: 2022-07-25
Admitting: INTERNAL MEDICINE

## 2022-07-25 LAB
BH CV XLRA MEAS LEFT DIST CCA EDV: -10 CM/SEC
BH CV XLRA MEAS LEFT DIST CCA PSV: -85.6 CM/SEC
BH CV XLRA MEAS LEFT DIST ICA EDV: -9.8 CM/SEC
BH CV XLRA MEAS LEFT DIST ICA PSV: -65.2 CM/SEC
BH CV XLRA MEAS LEFT ICA/CCA RATIO: 1.2
BH CV XLRA MEAS LEFT MID ICA EDV: -13.7 CM/SEC
BH CV XLRA MEAS LEFT MID ICA PSV: -63.2 CM/SEC
BH CV XLRA MEAS LEFT PROX CCA EDV: 8.4 CM/SEC
BH CV XLRA MEAS LEFT PROX CCA PSV: 78.1 CM/SEC
BH CV XLRA MEAS LEFT PROX ECA EDV: -4.1 CM/SEC
BH CV XLRA MEAS LEFT PROX ECA PSV: -74.5 CM/SEC
BH CV XLRA MEAS LEFT PROX ICA EDV: -10.6 CM/SEC
BH CV XLRA MEAS LEFT PROX ICA PSV: -103 CM/SEC
BH CV XLRA MEAS LEFT PROX SCLA PSV: 112 CM/SEC
BH CV XLRA MEAS LEFT VERTEBRAL A EDV: -10.2 CM/SEC
BH CV XLRA MEAS LEFT VERTEBRAL A PSV: -47.9 CM/SEC
BH CV XLRA MEAS RIGHT DIST CCA EDV: -7 CM/SEC
BH CV XLRA MEAS RIGHT DIST CCA PSV: -70.4 CM/SEC
BH CV XLRA MEAS RIGHT DIST ICA EDV: -19 CM/SEC
BH CV XLRA MEAS RIGHT DIST ICA PSV: -95.3 CM/SEC
BH CV XLRA MEAS RIGHT ICA/CCA RATIO: 1.35
BH CV XLRA MEAS RIGHT MID ICA EDV: -15.2 CM/SEC
BH CV XLRA MEAS RIGHT MID ICA PSV: -86.8 CM/SEC
BH CV XLRA MEAS RIGHT PROX CCA EDV: 11.1 CM/SEC
BH CV XLRA MEAS RIGHT PROX CCA PSV: 90.9 CM/SEC
BH CV XLRA MEAS RIGHT PROX ECA EDV: -5.9 CM/SEC
BH CV XLRA MEAS RIGHT PROX ECA PSV: -74.5 CM/SEC
BH CV XLRA MEAS RIGHT PROX ICA EDV: -10.5 CM/SEC
BH CV XLRA MEAS RIGHT PROX ICA PSV: -87.8 CM/SEC
BH CV XLRA MEAS RIGHT PROX SCLA PSV: 129 CM/SEC
BH CV XLRA MEAS RIGHT VERTEBRAL A EDV: 4.3 CM/SEC
BH CV XLRA MEAS RIGHT VERTEBRAL A PSV: 32.6 CM/SEC
LEFT ARM BP: NORMAL MMHG
MAXIMAL PREDICTED HEART RATE: 131 BPM
RIGHT ARM BP: NORMAL MMHG
STRESS TARGET HR: 111 BPM

## 2022-07-25 PROCEDURE — 93880 EXTRACRANIAL BILAT STUDY: CPT

## 2022-08-17 DIAGNOSIS — E03.9 PRIMARY HYPOTHYROIDISM: Chronic | ICD-10-CM

## 2022-08-17 RX ORDER — LEVOTHYROXINE SODIUM 0.05 MG/1
TABLET ORAL
Qty: 90 TABLET | Refills: 3 | Status: SHIPPED | OUTPATIENT
Start: 2022-08-17

## 2022-08-17 NOTE — TELEPHONE ENCOUNTER
Caller: Stephanie Hawkins    Relationship: Self    Best call back number: [phone]     Requested Prescriptions:   Requested Prescriptions     Pending Prescriptions Disp Refills   • levothyroxine (Synthroid) 50 MCG tablet 90 tablet 3     Sig: Take 1 p.o. every morning for low thyroid.        Pharmacy where request should be sent: [address] [phone] [facility] [phone] FX     Does the patient have less than a 3 day supply:  [x] Yes  [] No    Leslee Pendleton Rep   08/17/22 12:32 EDT

## 2022-09-26 ENCOUNTER — APPOINTMENT (OUTPATIENT)
Dept: BONE DENSITY | Facility: HOSPITAL | Age: 87
End: 2022-09-26

## 2022-09-26 ENCOUNTER — APPOINTMENT (OUTPATIENT)
Dept: MAMMOGRAPHY | Facility: HOSPITAL | Age: 87
End: 2022-09-26

## 2022-10-17 ENCOUNTER — CLINICAL SUPPORT (OUTPATIENT)
Dept: INTERNAL MEDICINE | Facility: CLINIC | Age: 87
End: 2022-10-17

## 2022-10-17 DIAGNOSIS — Z23 NEED FOR INFLUENZA VACCINATION: ICD-10-CM

## 2022-10-17 PROCEDURE — G0008 ADMIN INFLUENZA VIRUS VAC: HCPCS | Performed by: INTERNAL MEDICINE

## 2022-10-17 PROCEDURE — 90662 IIV NO PRSV INCREASED AG IM: CPT | Performed by: INTERNAL MEDICINE

## 2023-02-27 DIAGNOSIS — R00.2 HEART PALPITATIONS: Chronic | ICD-10-CM

## 2023-02-27 DIAGNOSIS — I10 BENIGN ESSENTIAL HYPERTENSION: Chronic | ICD-10-CM

## 2023-02-27 RX ORDER — METOPROLOL SUCCINATE 25 MG/1
TABLET, EXTENDED RELEASE ORAL
Qty: 90 TABLET | Refills: 3 | Status: SHIPPED | OUTPATIENT
Start: 2023-02-27

## 2023-05-01 DIAGNOSIS — I10 BENIGN ESSENTIAL HYPERTENSION: Chronic | ICD-10-CM

## 2023-05-01 DIAGNOSIS — E78.2 MIXED HYPERLIPIDEMIA: Chronic | ICD-10-CM

## 2023-05-01 RX ORDER — ATORVASTATIN CALCIUM 40 MG/1
TABLET, FILM COATED ORAL
Qty: 90 TABLET | Refills: 3 | Status: SHIPPED | OUTPATIENT
Start: 2023-05-01

## 2023-05-01 RX ORDER — HYDROCHLOROTHIAZIDE 25 MG/1
TABLET ORAL
Qty: 90 TABLET | Refills: 3 | Status: SHIPPED | OUTPATIENT
Start: 2023-05-01

## 2023-05-15 DIAGNOSIS — I10 BENIGN ESSENTIAL HYPERTENSION: Chronic | ICD-10-CM

## 2023-05-15 RX ORDER — AMLODIPINE BESYLATE AND BENAZEPRIL HYDROCHLORIDE 5; 40 MG/1; MG/1
CAPSULE ORAL
Qty: 90 CAPSULE | Refills: 3 | Status: SHIPPED | OUTPATIENT
Start: 2023-05-15

## 2023-06-02 ENCOUNTER — TELEPHONE (OUTPATIENT)
Dept: INTERNAL MEDICINE | Facility: CLINIC | Age: 88
End: 2023-06-02

## 2023-06-02 NOTE — TELEPHONE ENCOUNTER
Caller: Stephanie Hawkins    Relationship: Self    Best call back number: [phone]    What orders are you requesting (i.e. lab or imaging): LABS    In what timeframe would the patient need to come in: SOME TIME IN JULY    Where will you receive your lab/imaging services: IN OFFICE    Additional notes: PLEASE CALL PATIENT ONCE ORDERS ARE PLACED.    PLEASE CANCEL LAB APPOINTMENT SCHEDULED FOR 06.12.23    HUB ATTEMPTED TO WARM TRANSFER BUT WAS UNSUCCESSFUL

## 2023-06-05 DIAGNOSIS — R73.01 IMPAIRED FASTING GLUCOSE: ICD-10-CM

## 2023-06-05 DIAGNOSIS — E03.9 PRIMARY HYPOTHYROIDISM: ICD-10-CM

## 2023-06-05 DIAGNOSIS — Z00.00 ENCOUNTER FOR SUBSEQUENT ANNUAL WELLNESS VISIT (AWV) IN MEDICARE PATIENT: ICD-10-CM

## 2023-06-05 DIAGNOSIS — E78.2 MIXED HYPERLIPIDEMIA: ICD-10-CM

## 2023-06-05 DIAGNOSIS — I10 BENIGN ESSENTIAL HYPERTENSION: Primary | ICD-10-CM

## 2023-06-05 DIAGNOSIS — E55.9 VITAMIN D DEFICIENCY: ICD-10-CM

## 2023-09-19 ENCOUNTER — OFFICE VISIT (OUTPATIENT)
Dept: INTERNAL MEDICINE | Facility: CLINIC | Age: 88
End: 2023-09-19
Payer: MEDICARE

## 2023-09-19 VITALS
WEIGHT: 155 LBS | TEMPERATURE: 98 F | HEIGHT: 62 IN | HEART RATE: 65 BPM | SYSTOLIC BLOOD PRESSURE: 122 MMHG | BODY MASS INDEX: 28.52 KG/M2 | RESPIRATION RATE: 16 BRPM | OXYGEN SATURATION: 96 % | DIASTOLIC BLOOD PRESSURE: 76 MMHG

## 2023-09-19 DIAGNOSIS — R73.01 IMPAIRED FASTING GLUCOSE: Chronic | ICD-10-CM

## 2023-09-19 DIAGNOSIS — Z78.0 POSTMENOPAUSAL STATE: Chronic | ICD-10-CM

## 2023-09-19 DIAGNOSIS — K58.0 IRRITABLE BOWEL SYNDROME WITH DIARRHEA: Chronic | ICD-10-CM

## 2023-09-19 DIAGNOSIS — I10 BENIGN ESSENTIAL HYPERTENSION: Chronic | ICD-10-CM

## 2023-09-19 DIAGNOSIS — R06.09 DYSPNEA ON EXERTION: ICD-10-CM

## 2023-09-19 DIAGNOSIS — E78.2 MIXED HYPERLIPIDEMIA: Chronic | ICD-10-CM

## 2023-09-19 DIAGNOSIS — M85.89 OSTEOPENIA OF MULTIPLE SITES: Chronic | ICD-10-CM

## 2023-09-19 DIAGNOSIS — Z00.00 ENCOUNTER FOR SUBSEQUENT ANNUAL WELLNESS VISIT (AWV) IN MEDICARE PATIENT: Primary | ICD-10-CM

## 2023-09-19 DIAGNOSIS — E55.9 VITAMIN D DEFICIENCY: Chronic | ICD-10-CM

## 2023-09-19 DIAGNOSIS — I65.23 ATHEROSCLEROSIS OF BOTH CAROTID ARTERIES: Chronic | ICD-10-CM

## 2023-09-19 DIAGNOSIS — E83.52 HYPERCALCEMIA: ICD-10-CM

## 2023-09-19 DIAGNOSIS — N32.89 IRRITABLE BLADDER: Chronic | ICD-10-CM

## 2023-09-19 DIAGNOSIS — H40.10X0 OPEN-ANGLE GLAUCOMA OF BOTH EYES, UNSPECIFIED GLAUCOMA STAGE, UNSPECIFIED OPEN-ANGLE GLAUCOMA TYPE: Chronic | ICD-10-CM

## 2023-09-19 DIAGNOSIS — Z51.81 THERAPEUTIC DRUG MONITORING: ICD-10-CM

## 2023-09-19 DIAGNOSIS — E03.9 PRIMARY HYPOTHYROIDISM: Chronic | ICD-10-CM

## 2023-09-19 DIAGNOSIS — K86.9 PANCREATIC LESION: Chronic | ICD-10-CM

## 2023-09-19 NOTE — PROGRESS NOTES
The ABCs of the Annual Wellness Visit  Subsequent Medicare Wellness Visit    Subjective      Stephanie Hawkins is a 89 y.o. female who presents for a Subsequent Medicare Wellness Visit.    The following portions of the patient's history were reviewed and   updated as appropriate: allergies, current medications, past family history, past medical history, past social history, past surgical history, and problem list.    Compared to one year ago, the patient feels her physical   health is worse.    Compared to one year ago, the patient feels her mental   health is the same.    Recent Hospitalizations:  She was not admitted to the hospital during the last year.       Current Medical Providers:  Patient Care Team:  Jeovany Dietrich MD as PCP - General (Internal Medicine)    Outpatient Medications Prior to Visit   Medication Sig Dispense Refill    amLODIPine-benazepril (LOTREL) 5-40 MG per capsule TAKE 1 CAPSULE DAILY 90 capsule 3    aspirin 81 MG tablet Take  by mouth.      atorvastatin (LIPITOR) 40 MG tablet TAKE 1 TABLET DAILY FOR HIGH CHOLESTEROL 90 tablet 3    Cholecalciferol (VITAMIN D3) 5000 UNITS capsule capsule 1 by mouth daily as directed 30 capsule     fluticasone (FLONASE) 50 MCG/ACT nasal spray 2 sprays into the nostril(s) as directed by provider Daily. 16 g 0    Garlic 1000 MG capsule Take  by mouth.      hydroCHLOROthiazide (HYDRODIURIL) 25 MG tablet TAKE 1 TABLET DAILY FOR HIGH BLOOD PRESSURE 90 tablet 3    levothyroxine (Synthroid) 50 MCG tablet Take 1 p.o. every morning for low thyroid. 90 tablet 0    metoprolol succinate XL (TOPROL-XL) 25 MG 24 hr tablet TAKE 1 TABLET EVERY MORNING FOR HIGH BLOOD PRESSURE, HEART AND PALPITATIONS 90 tablet 3    Multiple Vitamin (MULTI VITAMIN DAILY PO) Take  by mouth daily.      Multiple Vitamins-Minerals (PRESERVISION AREDS PO) Take  by mouth Every Night.      timolol (TIMOPTIC) 0.5 % ophthalmic solution Apply as directed to the affected eye for glaucoma      tolterodine  "LA (Detrol LA) 2 MG 24 hr capsule Take 1 p.o. daily for irritable bladder 90 capsule 3     No facility-administered medications prior to visit.       No opioid medication identified on active medication list. I have reviewed chart for other potential  high risk medication/s and harmful drug interactions in the elderly.        Aspirin is on active medication list. Aspirin use is indicated based on review of current medical condition/s. Pros and cons of this therapy have been discussed today. Benefits of this medication outweigh potential harm.  Patient has been encouraged to continue taking this medication.  .      Patient Active Problem List   Diagnosis    Benign essential hypertension    Carotid artery plaque, 6/13/2019--mild bilateral.  05/30/2017--16-49% left.  Mild plaque right.  05/30/2014--mild bilateral carotid plaque.    Diverticulosis of colon    Heart palpitations    Hyperlipidemia    Irritable bowel syndrome    Primary osteoarthritis of left knee    Osteopenia of multiple sites    Vitamin D deficiency    Therapeutic drug monitoring    Glaucoma, bilateral    Irritable bladder    Primary hypothyroidism    Impaired fasting glucose    Pancreatic lesion, 6/13/2019--dilated pancreatic side duct.  No mass.  No further follow-up.  4 mm, repeat study due June 2019    Chronic left hip pain    Postmenopausal state    Dyspnea on exertion    Hypercalcemia     Advance Care Planning   Advance Care Planning     Advance Directive is not on file.  ACP discussion was held with the patient during this visit. Patient does not have an advance directive, information provided.     Objective    Vitals:    09/19/23 0848   BP: 122/76   Pulse: 65   Resp: 16   Temp: 98 °F (36.7 °C)   TempSrc: Temporal   SpO2: 96%   Weight: 70.3 kg (155 lb)   Height: 157.5 cm (62.01\")     Estimated body mass index is 28.34 kg/m² as calculated from the following:    Height as of this encounter: 157.5 cm (62.01\").    Weight as of this encounter: 70.3 kg " (155 lb).    BMI is >= 25 and <30. (Overweight) The following options were offered after discussion;: Patient is 89 years of age and only slightly overweight.  I am not going to restrict her.      Does the patient have evidence of cognitive impairment?   No    Lab Results   Component Value Date    CHLPL 154 2023    TRIG 100 2023    HGBA1C 6.30 (H) 2023          HEALTH RISK ASSESSMENT    Smoking Status:  Social History     Tobacco Use   Smoking Status Never   Smokeless Tobacco Never     Alcohol Consumption:  Social History     Substance and Sexual Activity   Alcohol Use No     Fall Risk Screen:    STEADI Fall Risk Assessment was completed, and patient is at LOW risk for falls.Assessment completed on:2023    Depression Screenin/19/2023     8:52 AM   PHQ-2/PHQ-9 Depression Screening   Little Interest or Pleasure in Doing Things 0-->not at all   Feeling Down, Depressed or Hopeless 0-->not at all   PHQ-9: Brief Depression Severity Measure Score 0       Health Habits and Functional and Cognitive Screenin/3/2021     2:21 PM   Functional & Cognitive Status   Do you have difficulty preparing food and eating? No   Do you have difficulty bathing yourself, getting dressed or grooming yourself? No   Do you have difficulty using the toilet? No   Do you have difficulty moving around from place to place? No   Do you have trouble with steps or getting out of a bed or a chair? No   Current Diet Well Balanced Diet   Dental Exam Up to date   Eye Exam Up to date   Current Exercises Include No Regular Exercise   Do you need help using the phone?  No   Are you deaf or do you have serious difficulty hearing?  No   Do you need help to go to places out of walking distance? No   Do you need help shopping? No   Do you need help preparing meals?  No   Do you need help with housework?  No   Do you need help with laundry? No   Do you need help taking your medications? No   Do you need help managing money?  No   Do you ever drive or ride in a car without wearing a seat belt? No   Have you felt unusual stress, anger or loneliness in the last month? No   Who do you live with? Spouse   If you need help, do you have trouble finding someone available to you? No   Have you been bothered in the last four weeks by sexual problems? No   Do you have difficulty concentrating, remembering or making decisions? No       Age-appropriate Screening Schedule:  Refer to the list below for future screening recommendations based on patient's age, sex and/or medical conditions. Orders for these recommended tests are listed in the plan section. The patient has been provided with a written plan.    Health Maintenance   Topic Date Due    DXA SCAN  06/20/2020    COVID-19 Vaccine (5 - Pfizer series) 07/09/2022    INFLUENZA VACCINE  10/01/2023    LIPID PANEL  07/24/2024    ANNUAL WELLNESS VISIT  09/19/2024    TDAP/TD VACCINES (3 - Td or Tdap) 01/10/2030    Pneumococcal Vaccine 65+  Completed    ZOSTER VACCINE  Discontinued    BMI FOLLOWUP  Discontinued                Thyroid function tests are normal.  Total cholesterol 154, triglycerides 100, LDL particle #769, HDL particle #40.6.  CMP normal except glucose 106, calcium slightly elevated at 10.0.  Vitamin D is low at 26.0.  Hemoglobin A1c 6.3.    CMS Preventative Services Quick Reference  Risk Factors Identified During Encounter:    Immunizations Discussed/Encouraged: COVID19 and RSV    The above risks/problems have been discussed with the patient.  Pertinent information has been shared with the patient in the After Visit Summary.    Diagnoses and all orders for this visit:    1. Encounter for subsequent annual wellness visit (AWV) in Medicare patient (Primary)    2. Impaired fasting glucose  -     Comprehensive Metabolic Panel; Future  -     Hemoglobin A1c; Future  -     Urinalysis With Microscopic If Indicated (No Culture) - Urine, Clean Catch; Future    3. Hyperlipidemia  -     CK; Future  -      Comprehensive Metabolic Panel; Future  -     NMR LipoProfile; Future    4. Primary hypothyroidism  -     TSH; Future  -     T4, Free; Future  -     T3, Free; Future    5. Vitamin D deficiency  -     Vitamin D,25-Hydroxy; Future    6. Benign essential hypertension  -     Comprehensive Metabolic Panel; Future    7. Carotid artery plaque, 6/13/2019--mild bilateral.  05/30/2017--16-49% left.  Mild plaque right.  05/30/2014--mild bilateral carotid plaque.    8. Open-angle glaucoma of both eyes, unspecified glaucoma stage, unspecified open-angle glaucoma type    9. Irritable bladder    10. Irritable bowel syndrome with diarrhea    11. Osteopenia of multiple sites  -     DEXA Bone Density Axial    12. Pancreatic lesion, 6/13/2019--dilated pancreatic side duct.  No mass.  No further follow-up.  4 mm, repeat study due June 2019    13. Postmenopausal state  -     DEXA Bone Density Axial    14. Therapeutic drug monitoring  -     CBC (No Diff); Future    15. Dyspnea on exertion  -     XR Chest PA & Lateral    16. Hypercalcemia  -     Calcium, Ionized  -     PTH, Intact        Follow Up:   Next Medicare Wellness visit to be scheduled in 1 year.      An After Visit Summary and PPPS were made available to the patient.    September 19, 2023--addendum: Patient has a new problem that needs to be addressed above and beyond Medicare wellness visit and this will be documented below:    September 19, 2023--patient presents with a nearly 1 year history of intermittent episodes of dyspnea on exertion.  She gets short of breath with minimal to moderate activity.  She does not do any heavy exertion.  There is been no chest pain or pressure and no symptoms involving her neck or upper extremities.  She gets relief with resting.  There is not been any wheezing, cough, fever, chills, or abnormal weight loss.  On exam today her breath sounds are diminished bilaterally and there are very slight end expiratory wheezes noted bibasilarly.  Also  patient has new hypercalcemia that needs to be further evaluated.      Plan is as follows: Obtain chest x-ray PA and lateral.  Check ionized calcium and intact PTH today.  Follow-up on phone for the results.  May consider pharmacologic stress test depending upon results.  I will contact the patient with the results of the x-ray as soon as available and possible further instructions.

## 2023-09-20 LAB
CA-I SERPL ISE-MCNC: 5.1 MG/DL (ref 4.5–5.6)
PTH-INTACT SERPL-MCNC: 32 PG/ML (ref 15–65)

## 2023-10-04 ENCOUNTER — OFFICE VISIT (OUTPATIENT)
Dept: ORTHOPEDIC SURGERY | Facility: CLINIC | Age: 88
End: 2023-10-04
Payer: MEDICARE

## 2023-10-04 VITALS — HEIGHT: 62 IN | TEMPERATURE: 97.5 F | BODY MASS INDEX: 28.67 KG/M2 | WEIGHT: 155.8 LBS

## 2023-10-04 DIAGNOSIS — R52 PAIN: Primary | ICD-10-CM

## 2023-10-04 DIAGNOSIS — M25.552 LATERAL PAIN OF LEFT HIP: ICD-10-CM

## 2023-10-04 RX ORDER — METHYLPREDNISOLONE ACETATE 80 MG/ML
80 INJECTION, SUSPENSION INTRA-ARTICULAR; INTRALESIONAL; INTRAMUSCULAR; SOFT TISSUE
Status: COMPLETED | OUTPATIENT
Start: 2023-10-04 | End: 2023-10-04

## 2023-10-04 RX ORDER — LIDOCAINE HYDROCHLORIDE 10 MG/ML
2 INJECTION, SOLUTION EPIDURAL; INFILTRATION; INTRACAUDAL; PERINEURAL
Status: COMPLETED | OUTPATIENT
Start: 2023-10-04 | End: 2023-10-04

## 2023-10-04 RX ADMIN — METHYLPREDNISOLONE ACETATE 80 MG: 80 INJECTION, SUSPENSION INTRA-ARTICULAR; INTRALESIONAL; INTRAMUSCULAR; SOFT TISSUE at 14:02

## 2023-10-04 RX ADMIN — LIDOCAINE HYDROCHLORIDE 2 ML: 10 INJECTION, SOLUTION EPIDURAL; INFILTRATION; INTRACAUDAL; PERINEURAL at 14:02

## 2023-10-04 NOTE — PROGRESS NOTES
Patient Name: Stephanie Hawkins   YOB: 1932  Referring Primary Care Physician: Jeovany Dietrich MD  BMI: Body mass index is 28.49 kg/m².    Chief Complaint:    Chief Complaint   Patient presents with    Left Hip - Initial Evaluation        HPI:     Stephanie Hawkins is a 89 y.o. female who presents today for evaluation of   Chief Complaint   Patient presents with    Left Hip - Initial Evaluation   .  Stephanie is an active 89-year-old.  She has atraumatic left hip pain this bothered her for some time.  Hurts her little bit when she sleeps on her when she gets around but definitely nagging.      Subjective   Medications:   Home Medications:  Current Outpatient Medications on File Prior to Visit   Medication Sig    amLODIPine-benazepril (LOTREL) 5-40 MG per capsule TAKE 1 CAPSULE DAILY    aspirin 81 MG tablet Take  by mouth.    atorvastatin (LIPITOR) 40 MG tablet TAKE 1 TABLET DAILY FOR HIGH CHOLESTEROL    Cholecalciferol (VITAMIN D3) 5000 UNITS capsule capsule 1 by mouth daily as directed    fluticasone (FLONASE) 50 MCG/ACT nasal spray 2 sprays into the nostril(s) as directed by provider Daily.    Garlic 1000 MG capsule Take  by mouth.    hydroCHLOROthiazide (HYDRODIURIL) 25 MG tablet TAKE 1 TABLET DAILY FOR HIGH BLOOD PRESSURE    levothyroxine (Synthroid) 50 MCG tablet Take 1 p.o. every morning for low thyroid.    metoprolol succinate XL (TOPROL-XL) 25 MG 24 hr tablet TAKE 1 TABLET EVERY MORNING FOR HIGH BLOOD PRESSURE, HEART AND PALPITATIONS    Multiple Vitamin (MULTI VITAMIN DAILY PO) Take  by mouth daily.    Multiple Vitamins-Minerals (PRESERVISION AREDS PO) Take  by mouth Every Night.    timolol (TIMOPTIC) 0.5 % ophthalmic solution Apply as directed to the affected eye for glaucoma    tolterodine LA (Detrol LA) 2 MG 24 hr capsule Take 1 p.o. daily for irritable bladder     No current facility-administered medications on file prior to visit.     Current Medications:  Scheduled Meds:  Continuous  Infusions:No current facility-administered medications for this visit.    PRN Meds:.    I have reviewed the patient's medical history in detail and updated the computerized patient record.  Review and summarization of old records includes:    Past Medical History:   Diagnosis Date    Benign essential hypertension 2/26/2001 02/26/2001--patient was seen as a new patient. She was ordered being treated for hypertension at that time.    Carotid artery plaque, 6/13/2019--mild bilateral.  05/30/2017--16-49% left.  Mild plaque right.  05/30/2014--mild bilateral carotid plaque. 1/8/2002 June 13, 2019--carotid Doppler study reveals mild bilateral carotid plaque.  No percentage numbers given.  05/30/2017--carotid Doppler study reveals right plaque without stenosis.  There is 16-49% stenosis of the left ICA.  05/30/2014--vascular screen reveals mild bilateral carotid plaque, negative for AAA, negative for PAD.   06/19/2012--vascular screen revealed bilateral, less than 50% internal     Chronic left hip pain 6/25/2020 June 25, 2020--patient reports that she has had intermittent left hip pain for several years now.  It may or may not be getting worse and we discussed orthopedic referral which we will hold off at the present time.  Instead we will obtain an x-ray and depending upon the result and her clinical course will determine orthopedic referral.    Diverticulosis of colon 10/16/2001    11/03/2006--the entire examined colon was normal except for scattered diverticuli.   10/16/2001--colonoscopy revealed a few left-sided scattered diverticuli.    Glaucoma, bilateral 6/1/2016 06/01/2016--routine follow-up.  She reports she was diagnosed with glaucoma about a month or so ago treated with Travatan eyedrops.    Heart palpitations 10/13/2008    Patient has at a long history of heart palpitations.   10/13/2008--24-hour Holter monitor reveals an average heart rate of 69, a minimum heart rate was 55. Maximum heart rate was  98. There were no pauses. Ventricular ectopy was zero. Supraventricular ectopy was 5585, with 15 supraventricular runs. Supraventricular bigeminy events were 6 and supraventricular trigeminy events were one. There was no atrial fibrillation/flutter. No ST segment changes. Patient's heart palpitations related to frequent PACs which have been controlled with atenolol.    Hyperlipidemia 2/26/2001 02/26/2001--patient was seen as a new patient. She was already on cholesterol medication at that time.    Impaired fasting glucose 6/14/2018 06/14/2018--routine follow-up.  Fasting serum glucose mildly elevated at 102.  Recommend low carbohydrate diet and weight loss.  Exercise.    Irritable bladder 8/29/2017 08/29/2017--patient seen in follow-up after recent urinary tract infection with complaints of nocturia ×4-5 per night as well as frequency and urgency.  Repeat urinalysis is negative.  Urine culture sent.  Given the fact the patient is had the urinary symptoms prior to the onset of the recent UTI, I will treat her empirically for irritable bladder.  Myrbetriq 25 mg per day.  May increase to 50 mg per day if necessary.  I will have her follow-up in about 3 weeks to reassess his situation.    Irritable bowel syndrome 4/27/2016    Menopausal state 5/17/2017    Osteopenia, 06/20/2018--lumbar spine 0.4.  Right femoral neck -1.1.  Left femoral neck -0.8.  05/30/2014--lumbar spine 0.6.  Right femoral neck -1.2.  Left femoral neck -1.7. 6/9/2006 06/20/2018--DEXA scan reveals average lumbar T score 0.4.  Right femoral neck T score -1.1.  Left femoral neck T score -0.8.  Assessment is osteopenia and compared to previous study in 2016 there is been no change in the right femoral neck and 17.2% improvement in the left femoral neck and a 2.1% decrease in the lumbar spine.  05/30/2014--DEXA scan reveals average lumbar spine T score of 0.6. Right femoral neck T score -1.2. Left femoral neck T score -1.7. Osteopenia. Compared  "to the previous examination of 11/09/2010, there is a 1% decrease in the left hip bone mineral density, 2.1% decrease in the right hip bone density, and a 1.6% improvement in the lumbar spine bone density.   11/22/2010--treatment for osteopenia begun with Fosamax but patient discontinued it on her own.   11/09/2010--DEXA scan revealed average lumbar spine T score 0.5. Left femoral neck T score -1.4. Right femoral neck T score -1.1. Osteopenia.   06/09/2006--DEXA scan reveals lumbar spine T score 0.6. Left hip T score -0.8, left     Pancreatic lesion, 4 mm, repeat study due June 2019 6/27/2018 11/27/2018--patient seen in follow-up and reports she is having urinary frequency without dysuria, but no incontinence and occasionally has some urgency.  She has no pain at the present time.  Apparently she saw a gynecologist and he did not examine her because \"we do not do Pap smears on a woman year age\".  06/27/2018--patient seen in follow-up and the results of the CT scan discussed.  She curre    Possible Pelvic congestion syndrome 6/27/2018 11/27/2018--patient seen in follow-up and reports she is having urinary frequency without dysuria, but no incontinence and occasionally has some urgency.  She has no pain at the present time.  Apparently she saw a gynecologist and he did not examine her because \"we do not do Pap smears on a woman year age\".  06/27/2018--patient seen in follow-up and the results of the CT scan discussed.  She curre    Primary hypothyroidism 12/5/2017    May 28, 2019--routine follow-up.  TSH slightly elevated at 4.56.  Free T3 and free T4 are normal.  Continued monitoring.  12/05/2017--routine follow-up.  TSH elevated at 5.19.  Free T3 and free T4 are normal.  Repeat thyroid function tests ordered.  Thyroid antibodies ordered.  If these confirm diagnosis of hypothyroidism and we will proceed with thyroid ultrasound.    Primary osteoarthritis of left knee 3/23/2004    03/23/2004--patient was " evaluated by the orthopedist for left knee pain. X-rays revealed osteoarthritis with some medial joint space narrowing, subchondral sclerosis, and patellofemoral spurs. Cortisone injections were given.    Subclinical hypothyroidism 2017    May 28, 2019--routine follow-up.  TSH slightly elevated at 4.56.  Free T3 and free T4 are normal.  Continued monitoring.  2017--routine follow-up.  TSH elevated at 5.19.  Free T3 and free T4 are normal.  Repeat thyroid function tests ordered.  Thyroid antibodies ordered.  If these confirm diagnosis of hypothyroidism and we will proceed with thyroid ultrasound.    Vitamin D deficiency 2016        Past Surgical History:   Procedure Laterality Date    CATARACT EXTRACTION Right 2014--cataract extirpation and intraocular lens implantation right eye.     CATARACT EXTRACTION Left 2010--cataract extirpation and intraocular lens implantation in the left eye.    COLONOSCOPY  2006--the entire examined colon was normal except for scattered diverticuli.     COLONOSCOPY  10/16/2001    10/16/2001--colonoscopy revealed a few left-sided scattered diverticuli.        Social History     Occupational History    Occupation: Retired () [employer]   Tobacco Use    Smoking status: Never    Smokeless tobacco: Never   Vaping Use    Vaping Use: Never used   Substance and Sexual Activity    Alcohol use: No    Drug use: No    Sexual activity: Not Currently     Partners: Male      Social History     Social History Narrative    Not on file        Family History   Problem Relation Age of Onset    Cirrhosis Mother         Of Liver. Mother  at age 62 from alcoholic cirrhosis.    Heart attack Father         Acute Myocardial Infarction.  Father  of a myocardial infarction at age 68.       ROS: 14 point review of systems was performed and all other systems were reviewed and are negative except for documented  "findings in HPI and today's encounter.     Allergies:   Allergies   Allergen Reactions    Neomycin-Bacitracin Zn-Polymyx Swelling     Neosporin    Sulfa Antibiotics Hives    Penicillins Unknown - Low Severity     Constitutional:  Denies fever, shaking or chills   Eyes:  Denies change in visual acuity   HENT:  Denies nasal congestion or sore throat   Respiratory:  Denies cough or shortness of breath   Cardiovascular:  Denies chest pain or severe LE edema   GI:  Denies abdominal pain, nausea, vomiting, bloody stools or diarrhea   Musculoskeletal:  Numbness, tingling, pain, or loss of motor function only as noted above in history of present illness.  : Denies painful urination or hematuria  Integument:  Denies rash, lesion or ulceration   Neurologic:  Denies headache or focal weakness  Endocrine:  Denies lymphadenopathy  Psych:  Denies confusion or change in mental status   Hem:  Denies active bleeding    OBJECTIVE:  Physical Exam: 89 y.o. female  Wt Readings from Last 3 Encounters:   10/04/23 70.7 kg (155 lb 12.8 oz)   09/19/23 70.3 kg (155 lb)   05/31/22 67.4 kg (148 lb 9.6 oz)     Ht Readings from Last 1 Encounters:   10/04/23 157.5 cm (62.01\")     Body mass index is 28.49 kg/m².  Vitals:    10/04/23 1330   Temp: 97.5 °F (36.4 °C)     Vital signs reviewed.     General Appearance:    Alert, cooperative, in no acute distress                  Eyes: conjunctiva clear  ENT: external ears and nose atraumatic  CV: no peripheral edema  Resp: normal respiratory effort  Skin: no rashes or wounds; normal turgor  Psych: mood and affect appropriate  Lymph: no nodes appreciated  Neuro: gross sensation intact  Vascular:  Palpable peripheral pulse in noted extremity  Musculoskeletal Extremities: Exam today shows tenderness over left greater trochanter Stinchfield is negative she has good range of motion of her hip she is not particularly tender in the lumbar area to direct palpation of mild tenderness in the SI joint on the left. "  She is able to transfer and walk well    Radiology:   AP of the hips lateral left hip taken the office today for complaints of left hip pain without comparison views shows some mild arthritic change in the hip with degenerative changes in the visualized portion of the lower lumbosacral spine        Assessment:     ICD-10-CM ICD-9-CM   1. Pain  R52 780.96   2. Lateral pain of left hip  M25.552 719.45        MDM/Plan:   The diagnosis(es), natural history, pathophysiology and treatment for diagnosis(es) were discussed. Opportunity given and questions answered.  Biomechanics of pertinent body areas discussed.  When appropriate, the use of ambulatory aids discussed.    Inflammation/pain control; with cold, heat, elevation and/or liniments discussed as appropriate  Cortisone Injection. See procedure note.  MEDICAL RECORDS reviewed from other provider(s) for past and current medical history pertinent to this complaint.  We discussed the different treatments.  Suggest to try an injection and see how that does.  If he gets worse or fails to get better we could work-up her lumbar spine    10/4/2023    Dictated utilizing Dragon dictation        Large Joint Arthrocentesis: L greater trochanteric bursa  Date/Time: 10/4/2023 2:02 PM  Consent given by: patient  Site marked: site marked  Timeout: Immediately prior to procedure a time out was called to verify the correct patient, procedure, equipment, support staff and site/side marked as required   Supporting Documentation  Indications: pain   Procedure Details  Location: hip - L greater trochanteric bursa  Preparation: Patient was prepped and draped in the usual sterile fashion  Needle gauge: 21g.  Approach: lateral  Medications administered: 80 mg methylPREDNISolone acetate 80 MG/ML; 2 mL lidocaine PF 1% 1 %  Patient tolerance: patient tolerated the procedure well with no immediate complications

## 2023-11-01 DIAGNOSIS — E03.9 PRIMARY HYPOTHYROIDISM: Chronic | ICD-10-CM

## 2023-11-01 RX ORDER — LEVOTHYROXINE SODIUM 0.05 MG/1
TABLET ORAL
Qty: 90 TABLET | Refills: 3 | Status: SHIPPED | OUTPATIENT
Start: 2023-11-01

## 2023-11-01 NOTE — TELEPHONE ENCOUNTER
Caller: Stephanie Hawkins    Relationship: Self    Best call back number:[phone]     Requested Prescriptions:   Requested Prescriptions     Pending Prescriptions Disp Refills    levothyroxine (Synthroid) 50 MCG tablet 90 tablet 0     Sig: Take 1 p.o. every morning for low thyroid.        Pharmacy where request should be sent: [facility] [address] [phone] [facility] [phone]      Last office visit with prescribing clinician: 9/19/2023   Last telemedicine visit with prescribing clinician: Visit date not found   Next office visit with prescribing clinician: 3/15/2024     Additional details provided by patient: PATIENT SAYS THAT SHE IS COMPLETELY OUT OF THIS MEDICATION BUT THAT SHE IS GOING TO BORROW FROM HER .  SHE STATES THAT HE TAKES THE EXACT SAME DOSE.    Does the patient have less than a 3 day supply:  [x] Yes  [] No    Would you like a call back once the refill request has been completed: [] Yes [x] No    If the office needs to give you a call back, can they leave a voicemail: [] Yes [x] No    Leslee Storm Rep   11/01/23 11:29 EDT

## 2023-11-08 ENCOUNTER — TELEPHONE (OUTPATIENT)
Dept: INTERNAL MEDICINE | Facility: CLINIC | Age: 88
End: 2023-11-08

## 2023-11-08 DIAGNOSIS — F41.8 SITUATIONAL ANXIETY: Primary | ICD-10-CM

## 2023-11-08 RX ORDER — ALPRAZOLAM 0.25 MG/1
TABLET ORAL
Qty: 30 TABLET | Refills: 0 | Status: SHIPPED | OUTPATIENT
Start: 2023-11-08

## 2023-11-08 NOTE — TELEPHONE ENCOUNTER
Caller: Stephanie Hawkins    Relationship to patient: Self    Best call back number: [phone]    Patient is needing: ASKS TO SPEAK TO DR SIMS. PATIENT STATES HER SON (ALSO A PATIENT OF DR SIMS) JUST PASSED AWAY.   [facility]

## 2024-02-21 ENCOUNTER — HOSPITAL ENCOUNTER (OUTPATIENT)
Dept: GENERAL RADIOLOGY | Facility: HOSPITAL | Age: 89
Discharge: HOME | End: 2024-02-21
Payer: MEDICARE

## 2024-02-21 ENCOUNTER — OFFICE VISIT (OUTPATIENT)
Dept: INTERNAL MEDICINE | Facility: CLINIC | Age: 89
End: 2024-02-21
Payer: MEDICARE

## 2024-02-21 VITALS
WEIGHT: 152 LBS | HEART RATE: 78 BPM | TEMPERATURE: 96.9 F | BODY MASS INDEX: 27.97 KG/M2 | RESPIRATION RATE: 16 BRPM | SYSTOLIC BLOOD PRESSURE: 130 MMHG | OXYGEN SATURATION: 98 % | HEIGHT: 62 IN | DIASTOLIC BLOOD PRESSURE: 72 MMHG

## 2024-02-21 DIAGNOSIS — I10 BENIGN ESSENTIAL HYPERTENSION: Chronic | ICD-10-CM

## 2024-02-21 DIAGNOSIS — M25.552 CHRONIC LEFT HIP PAIN: ICD-10-CM

## 2024-02-21 DIAGNOSIS — R00.2 HEART PALPITATIONS: Chronic | ICD-10-CM

## 2024-02-21 DIAGNOSIS — R31.0 GROSS HEMATURIA: Primary | ICD-10-CM

## 2024-02-21 DIAGNOSIS — G89.29 CHRONIC LEFT HIP PAIN: ICD-10-CM

## 2024-02-21 DIAGNOSIS — I65.23 ATHEROSCLEROSIS OF BOTH CAROTID ARTERIES: Chronic | ICD-10-CM

## 2024-02-21 DIAGNOSIS — M16.12 PRIMARY OSTEOARTHRITIS OF LEFT HIP: ICD-10-CM

## 2024-02-21 DIAGNOSIS — M70.62 TROCHANTERIC BURSITIS, LEFT HIP: ICD-10-CM

## 2024-02-21 PROBLEM — F41.8 SITUATIONAL ANXIETY: Chronic | Status: ACTIVE | Noted: 2023-11-08

## 2024-02-21 PROBLEM — R06.09 DYSPNEA ON EXERTION: Status: RESOLVED | Noted: 2023-09-19 | Resolved: 2024-02-21

## 2024-02-21 RX ORDER — DIPHENOXYLATE HYDROCHLORIDE AND ATROPINE SULFATE 2.5; .025 MG/1; MG/1
TABLET ORAL
Start: 2024-02-21

## 2024-02-21 RX ORDER — METOPROLOL SUCCINATE 25 MG/1
TABLET, EXTENDED RELEASE ORAL
Qty: 90 TABLET | Refills: 3 | Status: SHIPPED | OUTPATIENT
Start: 2024-02-21

## 2024-02-21 RX ORDER — METHYLPREDNISOLONE ACETATE 40 MG/ML
40 INJECTION, SUSPENSION INTRA-ARTICULAR; INTRALESIONAL; INTRAMUSCULAR; SOFT TISSUE ONCE
Status: COMPLETED | OUTPATIENT
Start: 2024-02-21 | End: 2024-02-21

## 2024-02-21 RX ADMIN — METHYLPREDNISOLONE ACETATE 40 MG: 40 INJECTION, SUSPENSION INTRA-ARTICULAR; INTRALESIONAL; INTRAMUSCULAR; SOFT TISSUE at 11:37

## 2024-02-21 NOTE — PROGRESS NOTES
02/21/2024    Patient Information  Stephanie Hawkins                                                                                          [address]  [facility] [address]      12/16/1932  [unfilled]  There is no work phone number on file.    Chief Complaint:     Possible blood in urine.  Complaining of left hip pain.    History of Present Illness:    Patient reports that she noticed some pinkish areas in her undershorts that she felt like might be blood and this was 2 weeks ago.  She did not have any symptoms at that time and specifically she does not have any symptoms currently including dysuria.  She has not noticed any pinkish material or possible blood since that time.    Patient also has complaint of left hip pain for several weeks to months that is been much worse here over the past few weeks.  Patient indicates it hurts to lie on her left hip.  She has had no injury.  She does have some osteoarthritis of the hip.  See below.    Past medical history reviewed and updated were necessary including health maintenance parameters.  This reveals she is currently up-to-date or else accounted for.    Review of Systems   Constitutional: Negative. Negative for chills and fever.   HENT: Negative.     Eyes: Negative.    Cardiovascular: Negative.    Respiratory: Negative.     Endocrine: Negative.    Hematologic/Lymphatic: Negative.    Skin: Negative.    Musculoskeletal:  Positive for arthritis and joint pain.        Left hip pain for several months   Gastrointestinal: Negative.    Genitourinary:  Positive for hematuria. Negative for bladder incontinence, dysuria, flank pain, frequency, hesitancy, nocturia, pelvic pain and urgency.   Neurological: Negative.    Psychiatric/Behavioral: Negative.     Allergic/Immunologic: Negative.        Active Problems:    Patient Active Problem List   Diagnosis    Benign essential hypertension    Carotid artery plaque, 6/13/2019--mild bilateral.  05/30/2017--16-49% left.   Mild plaque right.  05/30/2014--mild bilateral carotid plaque.    Diverticulosis of colon    Heart palpitations    Hyperlipidemia    Irritable bowel syndrome    Primary osteoarthritis of left knee    Osteopenia of multiple sites    Vitamin D deficiency    Therapeutic drug monitoring    Glaucoma, bilateral    Irritable bladder    Primary hypothyroidism    Impaired fasting glucose    Pancreatic lesion, 6/13/2019--dilated pancreatic side duct.  No mass.  No further follow-up.  4 mm, repeat study due June 2019    Chronic left hip pain    Postmenopausal state    Hypercalcemia    Situational anxiety    Gross hematuria    Primary osteoarthritis of left hip    Trochanteric bursitis, left hip         Past Medical History:   Diagnosis Date    Benign essential hypertension 02/26/2001 02/26/2001--patient was seen as a new patient. She was ordered being treated for hypertension at that time.    Carotid artery plaque, 6/13/2019--mild bilateral.  05/30/2017--16-49% left.  Mild plaque right.  05/30/2014--mild bilateral carotid plaque. 01/08/2002 June 13, 2019--carotid Doppler study reveals mild bilateral carotid plaque.  No percentage numbers given.  05/30/2017--carotid Doppler study reveals right plaque without stenosis.  There is 16-49% stenosis of the left ICA.  05/30/2014--vascular screen reveals mild bilateral carotid plaque, negative for AAA, negative for PAD.   06/19/2012--vascular screen revealed bilateral, less than 50% internal     Chronic left hip pain 06/25/2020 June 25, 2020--patient reports that she has had intermittent left hip pain for several years now.  It may or may not be getting worse and we discussed orthopedic referral which we will hold off at the present time.  Instead we will obtain an x-ray and depending upon the result and her clinical course will determine orthopedic referral.    Diverticulosis of colon 10/16/2001    11/03/2006--the entire examined colon was normal except for scattered  diverticuli.   10/16/2001--colonoscopy revealed a few left-sided scattered diverticuli.    Glaucoma, bilateral 06/01/2016 06/01/2016--routine follow-up.  She reports she was diagnosed with glaucoma about a month or so ago treated with Travatan eyedrops.    Heart palpitations 10/13/2008    Patient has at a long history of heart palpitations.   10/13/2008--24-hour Holter monitor reveals an average heart rate of 69, a minimum heart rate was 55. Maximum heart rate was 98. There were no pauses. Ventricular ectopy was zero. Supraventricular ectopy was 5585, with 15 supraventricular runs. Supraventricular bigeminy events were 6 and supraventricular trigeminy events were one. There was no atrial fibrillation/flutter. No ST segment changes. Patient's heart palpitations related to frequent PACs which have been controlled with atenolol.    Hyperlipidemia 02/26/2001 02/26/2001--patient was seen as a new patient. She was already on cholesterol medication at that time.    Impaired fasting glucose 06/14/2018 06/14/2018--routine follow-up.  Fasting serum glucose mildly elevated at 102.  Recommend low carbohydrate diet and weight loss.  Exercise.    Irritable bladder 08/29/2017 08/29/2017--patient seen in follow-up after recent urinary tract infection with complaints of nocturia ×4-5 per night as well as frequency and urgency.  Repeat urinalysis is negative.  Urine culture sent.  Given the fact the patient is had the urinary symptoms prior to the onset of the recent UTI, I will treat her empirically for irritable bladder.  Myrbetriq 25 mg per day.  May increase to 50 mg per day if necessary.  I will have her follow-up in about 3 weeks to reassess his situation.    Irritable bowel syndrome 04/27/2016    Menopausal state 05/17/2017    Osteopenia, 06/20/2018--lumbar spine 0.4.  Right femoral neck -1.1.  Left femoral neck -0.8.  05/30/2014--lumbar spine 0.6.  Right femoral neck -1.2.  Left femoral neck -1.7. 06/09/2006     "06/20/2018--DEXA scan reveals average lumbar T score 0.4.  Right femoral neck T score -1.1.  Left femoral neck T score -0.8.  Assessment is osteopenia and compared to previous study in 2016 there is been no change in the right femoral neck and 17.2% improvement in the left femoral neck and a 2.1% decrease in the lumbar spine.  05/30/2014--DEXA scan reveals average lumbar spine T score of 0.6. Right femoral neck T score -1.2. Left femoral neck T score -1.7. Osteopenia. Compared to the previous examination of 11/09/2010, there is a 1% decrease in the left hip bone mineral density, 2.1% decrease in the right hip bone density, and a 1.6% improvement in the lumbar spine bone density.   11/22/2010--treatment for osteopenia begun with Fosamax but patient discontinued it on her own.   11/09/2010--DEXA scan revealed average lumbar spine T score 0.5. Left femoral neck T score -1.4. Right femoral neck T score -1.1. Osteopenia.   06/09/2006--DEXA scan reveals lumbar spine T score 0.6. Left hip T score -0.8, left     Pancreatic lesion, 4 mm, repeat study due June 2019 06/27/2018 11/27/2018--patient seen in follow-up and reports she is having urinary frequency without dysuria, but no incontinence and occasionally has some urgency.  She has no pain at the present time.  Apparently she saw a gynecologist and he did not examine her because \"we do not do Pap smears on a woman year age\".  06/27/2018--patient seen in follow-up and the results of the CT scan discussed.  She curre    Possible Pelvic congestion syndrome 06/27/2018 11/27/2018--patient seen in follow-up and reports she is having urinary frequency without dysuria, but no incontinence and occasionally has some urgency.  She has no pain at the present time.  Apparently she saw a gynecologist and he did not examine her because \"we do not do Pap smears on a woman year age\".  06/27/2018--patient seen in follow-up and the results of the CT scan discussed.  She curre    " Primary hypothyroidism 12/05/2017    May 28, 2019--routine follow-up.  TSH slightly elevated at 4.56.  Free T3 and free T4 are normal.  Continued monitoring.  12/05/2017--routine follow-up.  TSH elevated at 5.19.  Free T3 and free T4 are normal.  Repeat thyroid function tests ordered.  Thyroid antibodies ordered.  If these confirm diagnosis of hypothyroidism and we will proceed with thyroid ultrasound.    Primary osteoarthritis of left knee 03/23/2004 03/23/2004--patient was evaluated by the orthopedist for left knee pain. X-rays revealed osteoarthritis with some medial joint space narrowing, subchondral sclerosis, and patellofemoral spurs. Cortisone injections were given.    Situational anxiety 11/08/2023    Subclinical hypothyroidism 12/05/2017    May 28, 2019--routine follow-up.  TSH slightly elevated at 4.56.  Free T3 and free T4 are normal.  Continued monitoring.  12/05/2017--routine follow-up.  TSH elevated at 5.19.  Free T3 and free T4 are normal.  Repeat thyroid function tests ordered.  Thyroid antibodies ordered.  If these confirm diagnosis of hypothyroidism and we will proceed with thyroid ultrasound.    Vitamin D deficiency 04/27/2016         Past Surgical History:   Procedure Laterality Date    CATARACT EXTRACTION Right 01/2014 January 2014--cataract extirpation and intraocular lens implantation right eye.     CATARACT EXTRACTION Left 09/2010 September 2010--cataract extirpation and intraocular lens implantation in the left eye.    COLONOSCOPY  11/03/2006 11/03/2006--the entire examined colon was normal except for scattered diverticuli.     COLONOSCOPY  10/16/2001    10/16/2001--colonoscopy revealed a few left-sided scattered diverticuli.         Allergies   Allergen Reactions    Neomycin-Bacitracin Zn-Polymyx Swelling     Neosporin    Sulfa Antibiotics Hives    Penicillins Unknown - Low Severity           Current Outpatient Medications:     ALPRAZolam (Xanax) 0.25 MG tablet, Take 1 p.o.  twice daily as needed for situational anxiety, Disp: 30 tablet, Rfl: 0    amLODIPine-benazepril (LOTREL) 5-40 MG per capsule, TAKE 1 CAPSULE DAILY, Disp: 90 capsule, Rfl: 3    Aspirin 81 MG capsule, Take 81 mg by mouth Daily., Disp: , Rfl:     atorvastatin (LIPITOR) 40 MG tablet, TAKE 1 TABLET DAILY FOR HIGH CHOLESTEROL, Disp: 90 tablet, Rfl: 3    Cholecalciferol (VITAMIN D3) 5000 UNITS capsule capsule, 1 by mouth daily as directed, Disp: 30 capsule, Rfl:     fluticasone (FLONASE) 50 MCG/ACT nasal spray, 2 sprays into the nostril(s) as directed by provider Daily., Disp: 16 g, Rfl: 0    hydroCHLOROthiazide (HYDRODIURIL) 25 MG tablet, TAKE 1 TABLET DAILY FOR HIGH BLOOD PRESSURE, Disp: 90 tablet, Rfl: 3    levothyroxine (Synthroid) 50 MCG tablet, Take 1 p.o. every morning for low thyroid., Disp: 90 tablet, Rfl: 3    metoprolol succinate XL (TOPROL-XL) 25 MG 24 hr tablet, TAKE 1 TABLET EVERY MORNING FOR HIGH BLOOD PRESSURE, HEART AND PALPITATIONS, Disp: 90 tablet, Rfl: 3    Multiple Vitamins-Minerals (PRESERVISION AREDS PO), Take  by mouth Every Night., Disp: , Rfl:     multivitamin (MULTI VITAMIN DAILY PO), Take 1 p.o. daily, Disp: , Rfl:     timolol (TIMOPTIC) 0.5 % ophthalmic solution, Apply as directed to the affected eye for glaucoma, Disp:  , Rfl:     tolterodine LA (Detrol LA) 2 MG 24 hr capsule, Take 1 p.o. daily for irritable bladder, Disp: 90 capsule, Rfl: 3    Current Facility-Administered Medications:     methylPREDNISolone acetate (DEPO-medrol) injection 40 mg, 40 mg, Intra-articular, Once, Jeovany Dietrich MD      Family History   Problem Relation Age of Onset    Cirrhosis Mother         Of Liver. Mother  at age 62 from alcoholic cirrhosis.    Heart attack Father         Acute Myocardial Infarction.  Father  of a myocardial infarction at age 68.         Social History     Socioeconomic History    Marital status:      Spouse name: Az    Number of children: 3    Highest education level:  "Bachelor's degree (e.g., BA, AB, BS)   Tobacco Use    Smoking status: Never    Smokeless tobacco: Never   Vaping Use    Vaping Use: Never used   Substance and Sexual Activity    Alcohol use: No    Drug use: No    Sexual activity: Not Currently     Partners: Male         Vitals:    02/21/24 1100   BP: 130/72   Pulse: 78   Resp: 16   Temp: 96.9 °F (36.1 °C)   TempSrc: Temporal   SpO2: 98%   Weight: 68.9 kg (152 lb)   Height: 157.5 cm (62.01\")        Body mass index is 27.79 kg/m².      Physical Exam:    General: Alert and oriented x 3.  No acute distress.  Normal affect.  HEENT: Pupils equal, round, reactive to light; extraocular movements intact; sclerae nonicteric; pharynx, ear canals and TMs normal.  Neck: Without JVD, thyromegaly, bruit, or adenopathy.  Lungs: Clear to auscultation in all fields.  Heart: Regular rate and rhythm without murmur, rub, gallop, or click.  Abdomen: Soft, nontender, without hepatosplenomegaly or hernia.  Bowel sounds normal.  : Deferred.  Rectal: Deferred.  Extremities: Without clubbing, cyanosis, edema, or pulse deficit.  Neurologic: Intact without focal deficit.  Normal station and gait observed during ingress and egress from the examination room.  Skin: Without significant lesion.  Musculoskeletal: The left hip has fairly good range of motion.  There is no deformity.  There is some tenderness with obvious discomfort to palpation posterior laterally.  No overlying skin lesions.    Lab/other results:      Assessment/Plan:     Diagnosis Plan   1. Gross hematuria  Urine Culture - , Urine, Clean Catch      2. Chronic left hip pain  XR Hip With or Without Pelvis 2 - 3 View Left      3. Primary osteoarthritis of left hip  methylPREDNISolone acetate (DEPO-medrol) injection 40 mg    XR Hip With or Without Pelvis 2 - 3 View Left      4. Trochanteric bursitis, left hip        5. Carotid artery plaque, 6/13/2019--mild bilateral.  05/30/2017--16-49% left.  Mild plaque right.  05/30/2014--mild " bilateral carotid plaque.  Aspirin 81 MG capsule        Patient presents with possibly asymptomatic gross hematuria.  She noticed some pinkish discoloration in her shorts anteriorly and this possibly could be blood.  She is not having any symptoms then or now.  Unfortunately, patient is not able to produce any urine but she has a lab appointment here in the next week or so and we can obtain a urinalysis at that time.  She has a several month history of left hip pain that is likely related to osteoarthritis and I think she would benefit from a steroid injection.  There could be some element of trochanteric bursitis as well.  She does need a repeat x-ray to rule out occult fracture or other occult lesion.    Plan is as follows: Will obtain urinalysis and urine culture in the near future as soon as patient is able to produce urine.  See procedure note below.  X-ray of the left hip and follow-up on phone for the results and possible further instructions.  Patient will keep her appointment with lab on March 7 and follow-up with me a week later.  She should contact me for any problems otherwise.    Procedures    Verbal informed consent given.  Risk, benefits, potential complications and alternatives discussed.  The left hip was prepped with alcohol wipes x 3 and injected with 40 mg of Depo-Medrol and 2 cc of Xylocaine.  Patient tolerated procedure well without apparent complication.  Postprocedure instructions given.

## 2024-03-07 ENCOUNTER — LAB (OUTPATIENT)
Dept: LAB | Facility: HOSPITAL | Age: 89
End: 2024-03-07
Payer: MEDICARE

## 2024-03-07 ENCOUNTER — HOSPITAL ENCOUNTER (OUTPATIENT)
Dept: GENERAL RADIOLOGY | Facility: HOSPITAL | Age: 89
Discharge: HOME | End: 2024-03-07
Payer: MEDICARE

## 2024-03-07 PROCEDURE — 73502 X-RAY EXAM HIP UNI 2-3 VIEWS: CPT

## 2024-03-15 ENCOUNTER — OFFICE VISIT (OUTPATIENT)
Dept: INTERNAL MEDICINE | Facility: CLINIC | Age: 89
End: 2024-03-15
Payer: MEDICARE

## 2024-03-15 VITALS
BODY MASS INDEX: 27.79 KG/M2 | DIASTOLIC BLOOD PRESSURE: 70 MMHG | RESPIRATION RATE: 16 BRPM | TEMPERATURE: 98.2 F | OXYGEN SATURATION: 97 % | HEIGHT: 62 IN | WEIGHT: 151 LBS | SYSTOLIC BLOOD PRESSURE: 126 MMHG | HEART RATE: 79 BPM

## 2024-03-15 DIAGNOSIS — E83.52 HYPERCALCEMIA: ICD-10-CM

## 2024-03-15 DIAGNOSIS — Z78.0 POSTMENOPAUSAL STATE: Chronic | ICD-10-CM

## 2024-03-15 DIAGNOSIS — I65.23 ATHEROSCLEROSIS OF BOTH CAROTID ARTERIES: Chronic | ICD-10-CM

## 2024-03-15 DIAGNOSIS — E78.2 MIXED HYPERLIPIDEMIA: Chronic | ICD-10-CM

## 2024-03-15 DIAGNOSIS — Z51.81 THERAPEUTIC DRUG MONITORING: ICD-10-CM

## 2024-03-15 DIAGNOSIS — K86.9 PANCREATIC LESION: Chronic | ICD-10-CM

## 2024-03-15 DIAGNOSIS — E55.9 VITAMIN D DEFICIENCY: Chronic | ICD-10-CM

## 2024-03-15 DIAGNOSIS — M15.9 PRIMARY OSTEOARTHRITIS INVOLVING MULTIPLE JOINTS: ICD-10-CM

## 2024-03-15 DIAGNOSIS — H40.10X0 OPEN-ANGLE GLAUCOMA OF BOTH EYES, UNSPECIFIED GLAUCOMA STAGE, UNSPECIFIED OPEN-ANGLE GLAUCOMA TYPE: Chronic | ICD-10-CM

## 2024-03-15 DIAGNOSIS — M85.89 OSTEOPENIA OF MULTIPLE SITES: Chronic | ICD-10-CM

## 2024-03-15 DIAGNOSIS — R31.0 GROSS HEMATURIA: ICD-10-CM

## 2024-03-15 DIAGNOSIS — K58.9 IRRITABLE BOWEL SYNDROME, UNSPECIFIED TYPE: Chronic | ICD-10-CM

## 2024-03-15 DIAGNOSIS — I10 BENIGN ESSENTIAL HYPERTENSION: Chronic | ICD-10-CM

## 2024-03-15 DIAGNOSIS — F41.8 SITUATIONAL ANXIETY: Chronic | ICD-10-CM

## 2024-03-15 DIAGNOSIS — N32.89 IRRITABLE BLADDER: Chronic | ICD-10-CM

## 2024-03-15 DIAGNOSIS — E03.9 PRIMARY HYPOTHYROIDISM: Chronic | ICD-10-CM

## 2024-03-15 DIAGNOSIS — R73.01 IMPAIRED FASTING GLUCOSE: Primary | Chronic | ICD-10-CM

## 2024-03-15 DIAGNOSIS — Z86.16 HISTORY OF 2019 NOVEL CORONAVIRUS DISEASE (COVID-19): ICD-10-CM

## 2024-03-15 PROBLEM — M16.12 PRIMARY OSTEOARTHRITIS OF LEFT HIP: Chronic | Status: ACTIVE | Noted: 2024-02-21

## 2024-03-15 PROBLEM — Z85.828 HISTORY OF SQUAMOUS CELL CARCINOMA OF SKIN: Status: ACTIVE | Noted: 2024-03-15

## 2024-03-15 PROBLEM — G89.29 CHRONIC LEFT HIP PAIN: Chronic | Status: RESOLVED | Noted: 2020-06-25 | Resolved: 2024-03-15

## 2024-03-15 PROBLEM — M70.62 TROCHANTERIC BURSITIS, LEFT HIP: Status: RESOLVED | Noted: 2024-02-21 | Resolved: 2024-03-15

## 2024-03-15 PROBLEM — Z85.828 HISTORY OF SQUAMOUS CELL CARCINOMA OF SKIN: Chronic | Status: ACTIVE | Noted: 2024-03-15

## 2024-03-15 PROBLEM — M25.552 CHRONIC LEFT HIP PAIN: Chronic | Status: RESOLVED | Noted: 2020-06-25 | Resolved: 2024-03-15

## 2024-03-15 RX ORDER — MELOXICAM 7.5 MG/1
TABLET ORAL
Qty: 30 TABLET | Refills: 3 | Status: SHIPPED | OUTPATIENT
Start: 2024-03-15

## 2024-03-15 NOTE — PROGRESS NOTES
03/15/2024    Patient Information  Stephanie Hawkins                                                                                          [address]  [facility] [address]      12/16/1932  [unfilled]  There is no work phone number on file.    Chief Complaint:     Follow-up medical problems as noted below.  No new acute complaints.    History of Present Illness:    Patient with several chronic medical problems as noted below in the assessment and plan presents today for follow-up with lab prior in order to monitor chronic medical issues.  Her past medical history reviewed and updated were necessary including health maintenance parameters.  This reveals she is currently up-to-date or else accounted for.    Review of Systems   Constitutional: Negative.   HENT: Negative.     Eyes: Negative.    Cardiovascular: Negative.    Respiratory: Negative.     Endocrine: Negative.    Hematologic/Lymphatic: Negative.    Skin: Negative.    Musculoskeletal:  Positive for arthritis and joint pain.   Gastrointestinal: Negative.    Genitourinary: Negative.    Neurological: Negative.    Psychiatric/Behavioral: Negative.     Allergic/Immunologic: Negative.        Active Problems:    Patient Active Problem List   Diagnosis    Benign essential hypertension    Carotid artery plaque, 6/13/2019--mild bilateral.  05/30/2017--16-49% left.  Mild plaque right.  05/30/2014--mild bilateral carotid plaque.    Diverticulosis of colon    Heart palpitations    Hyperlipidemia    Irritable bowel syndrome    Primary osteoarthritis of left knee    Osteopenia of multiple sites    Vitamin D deficiency    Therapeutic drug monitoring    Glaucoma, bilateral    Irritable bladder    Primary hypothyroidism    Impaired fasting glucose    Pancreatic lesion, 6/13/2019--dilated pancreatic side duct.  No mass.  No further follow-up.  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 She was ordered being treated for hypertension at that time.    Carotid artery plaque, 6/13/2019--mild bilateral.  05/30/2017--16-49% left.  Mild plaque right.  05/30/2014--mild bilateral carotid plaque. 01/08/2002 June 13, 2019--carotid Doppler study reveals mild bilateral carotid plaque.  No percentage numbers given.  05/30/2017--carotid Doppler study reveals right plaque without stenosis.  There is 16-49% stenosis of the left ICA.  05/30/2014--vascular screen reveals mild bilateral carotid plaque, negative for AAA, negative for PAD.   06/19/2012--vascular screen revealed bilateral, less than 50% internal     Chronic left hip pain 06/25/2020 June 25, 2020--patient reports that she has had intermittent left hip pain for several years now.  It may or may not be getting worse and we discussed orthopedic referral which we will hold off at the present time.  Instead we will obtain an x-ray and depending upon the result and her clinical course will determine orthopedic referral.    Chronic left hip pain 06/25/2020 June 25, 2020--patient reports that she has had intermittent left hip pain for several years now.  It may or may not be getting worse and we discussed orthopedic referral which we will hold off at the present time.  Instead we will obtain an x-ray and depending upon the result and her clinical course will determine orthopedic referral.      Diverticulosis of colon 10/16/2001    11/03/2006--the entire examined colon was normal except for scattered diverticuli.   10/16/2001--colonoscopy revealed a few left-sided scattered diverticuli.    Glaucoma, bilateral 06/01/2016 06/01/2016--routine follow-up.  She reports she was diagnosed with glaucoma about a month or so ago treated with  Travatan eyedrops.    Heart palpitations 10/13/2008    Patient has at a long history of heart palpitations.   10/13/2008--24-hour Holter monitor reveals an average heart rate of 69, a minimum heart rate was 55. Maximum heart rate was 98. There were no pauses. Ventricular ectopy was zero. Supraventricular ectopy was 5585, with 15 supraventricular runs. Supraventricular bigeminy events were 6 and supraventricular trigeminy events were one. There was no atrial fibrillation/flutter. No ST segment changes. Patient's heart palpitations related to frequent PACs which have been controlled with atenolol.    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      Hyperlipidemia 02/26/2001 02/26/2001--patient was seen as a new patient. She was already on cholesterol medication at that time.    Impaired fasting glucose 06/14/2018 06/14/2018--routine follow-up.  Fasting serum glucose mildly elevated at 102.  Recommend low carbohydrate diet and weight loss.  Exercise.    Irritable bladder 08/29/2017 08/29/2017--patient seen in follow-up after recent urinary tract infection with complaints of nocturia ×4-5 per night as well as frequency and urgency.  Repeat urinalysis is negative.  Urine culture sent.  Given the fact the patient is had the urinary symptoms prior to the onset of the recent UTI, I will treat her empirically for irritable bladder.  Myrbetriq 25 mg per day.  May increase to 50 mg per day if necessary.  I will have her follow-up in about 3 weeks to reassess his situation.    Irritable bowel syndrome 04/27/2016    Menopausal state 05/17/2017    Osteopenia, 06/20/2018--lumbar spine 0.4.  Right femoral neck -1.1.  Left femoral neck -0.8.  05/30/2014--lumbar spine 0.6.  Right femoral neck -1.2.  Left femoral neck -1.7. 06/09/2006 06/20/2018--DEXA scan  "reveals average lumbar T score 0.4.  Right femoral neck T score -1.1.  Left femoral neck T score -0.8.  Assessment is osteopenia and compared to previous study in 2016 there is been no change in the right femoral neck and 17.2% improvement in the left femoral neck and a 2.1% decrease in the lumbar spine.  05/30/2014--DEXA scan reveals average lumbar spine T score of 0.6. Right femoral neck T score -1.2. Left femoral neck T score -1.7. Osteopenia. Compared to the previous examination of 11/09/2010, there is a 1% decrease in the left hip bone mineral density, 2.1% decrease in the right hip bone density, and a 1.6% improvement in the lumbar spine bone density.   11/22/2010--treatment for osteopenia begun with Fosamax but patient discontinued it on her own.   11/09/2010--DEXA scan revealed average lumbar spine T score 0.5. Left femoral neck T score -1.4. Right femoral neck T score -1.1. Osteopenia.   06/09/2006--DEXA scan reveals lumbar spine T score 0.6. Left hip T score -0.8, left     Pancreatic lesion, 4 mm, repeat study due June 2019 06/27/2018 11/27/2018--patient seen in follow-up and reports she is having urinary frequency without dysuria, but no incontinence and occasionally has some urgency.  She has no pain at the present time.  Apparently she saw a gynecologist and he did not examine her because \"we do not do Pap smears on a woman year age\".  06/27/2018--patient seen in follow-up and the results of the CT scan discussed.  She curre    Possible Pelvic congestion syndrome 06/27/2018 11/27/2018--patient seen in follow-up and reports she is having urinary frequency without dysuria, but no incontinence and occasionally has some urgency.  She has no pain at the present time.  Apparently she saw a gynecologist and he did not examine her because \"we do not do Pap smears on a woman year age\".  06/27/2018--patient seen in follow-up and the results of the CT scan discussed.  She curre    Primary hypothyroidism " 12/05/2017    May 28, 2019--routine follow-up.  TSH slightly elevated at 4.56.  Free T3 and free T4 are normal.  Continued monitoring.  12/05/2017--routine follow-up.  TSH elevated at 5.19.  Free T3 and free T4 are normal.  Repeat thyroid function tests ordered.  Thyroid antibodies ordered.  If these confirm diagnosis of hypothyroidism and we will proceed with thyroid ultrasound.    Primary osteoarthritis of left hip 02/21/2024 June 25, 2020--patient reports that she has had intermittent left hip pain for several years now.  It may or may not be getting worse and we discussed orthopedic referral which we will hold off at the present time.  Instead we will obtain an x-ray and depending upon the result and her clinical course will determine orthopedic referral.      Primary osteoarthritis of left knee 03/23/2004 03/23/2004--patient was evaluated by the orthopedist for left knee pain. X-rays revealed osteoarthritis with some medial joint space narrowing, subchondral sclerosis, and patellofemoral spurs. Cortisone injections were given.    Situational anxiety 11/08/2023    Subclinical hypothyroidism 12/05/2017    May 28, 2019--routine follow-up.  TSH slightly elevated at 4.56.  Free T3 and free T4 are normal.  Continued monitoring.  12/05/2017--routine follow-up.  TSH elevated at 5.19.  Free T3 and free T4 are normal.  Repeat thyroid function tests ordered.  Thyroid antibodies ordered.  If these confirm diagnosis of hypothyroidism and we will proceed with thyroid ultrasound.    Vitamin D deficiency 04/27/2016         Past Surgical History:   Procedure Laterality Date    CATARACT EXTRACTION Right 01/2014 January 2014--cataract extirpation and intraocular lens implantation right eye.     CATARACT EXTRACTION Left 09/2010 September 2010--cataract extirpation and intraocular lens implantation in the left eye.    COLONOSCOPY  11/03/2006 11/03/2006--the entire examined colon was normal except for scattered  diverticuli.     COLONOSCOPY  10/16/2001    10/16/2001--colonoscopy revealed a few left-sided scattered diverticuli.    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         Allergies   Allergen Reactions    Neomycin-Bacitracin Zn-Polymyx Swelling     Neosporin    Sulfa Antibiotics Hives    Penicillins Unknown - Low Severity           Current Outpatient Medications:     ALPRAZolam (Xanax) 0.25 MG tablet, Take 1 p.o. twice daily as needed for situational anxiety, Disp: 30 tablet, Rfl: 0    amLODIPine-benazepril (LOTREL) 5-40 MG per capsule, TAKE 1 CAPSULE DAILY, Disp: 90 capsule, Rfl: 3    Aspirin 81 MG capsule, Take 81 mg by mouth Daily., Disp: , Rfl:     atorvastatin (LIPITOR) 40 MG tablet, TAKE 1 TABLET DAILY FOR HIGH CHOLESTEROL, Disp: 90 tablet, Rfl: 3    fluticasone (FLONASE) 50 MCG/ACT nasal spray, 2 sprays into the nostril(s) as directed by provider Daily., Disp: 16 g, Rfl: 0    hydroCHLOROthiazide (HYDRODIURIL) 25 MG tablet, TAKE 1 TABLET DAILY FOR HIGH BLOOD PRESSURE, Disp: 90 tablet, Rfl: 3    levothyroxine (Synthroid) 50 MCG tablet, Take 1 p.o. every morning for low thyroid., Disp: 90 tablet, Rfl: 3    metoprolol succinate XL (TOPROL-XL) 25 MG 24 hr tablet, TAKE 1 TABLET EVERY MORNING FOR HIGH BLOOD PRESSURE, HEART AND PALPITATIONS, Disp: 90 tablet, Rfl: 3    Multiple Vitamins-Minerals (PRESERVISION AREDS PO), Take  by mouth Every Night., Disp: , Rfl:     multivitamin (MULTI VITAMIN DAILY PO), Take 1 p.o. daily, Disp: , Rfl:     timolol (TIMOPTIC) 0.5 % ophthalmic solution, Apply as directed to the affected eye for glaucoma, Disp:  , Rfl:     tolterodine LA (Detrol LA) 2 MG 24 hr capsule, Take 1 p.o. daily for irritable bladder, Disp: 90 capsule, Rfl: 3    vitamin D3 125 MCG (5000 UT) capsule capsule, 1 by mouth daily as directed, Disp: , Rfl:     meloxicam (Mobic) 7.5 MG tablet,  "Take 1 p.o. daily for arthritis, Disp: 30 tablet, Rfl: 3      Family History   Problem Relation Age of Onset    Cirrhosis Mother         Of Liver. Mother  at age 62 from alcoholic cirrhosis.    Heart attack Father         Acute Myocardial Infarction.  Father  of a myocardial infarction at age 68.         Social History     Socioeconomic History    Marital status:      Spouse name: Az    Number of children: 3    Highest education level: Bachelor's degree (e.g., BA, AB, BS)   Tobacco Use    Smoking status: Never    Smokeless tobacco: Never   Vaping Use    Vaping status: Never Used   Substance and Sexual Activity    Alcohol use: No    Drug use: No    Sexual activity: Not Currently     Partners: Male         Vitals:    03/15/24 0957   BP: 126/70   Pulse: 79   Resp: 16   Temp: 98.2 °F (36.8 °C)   TempSrc: Temporal   SpO2: 97%   Weight: 68.5 kg (151 lb)   Height: 157.5 cm (62.01\")        Body mass index is 27.61 kg/m².      Physical Exam:    General: Alert and oriented x 3.  No acute distress.  Normal affect.  HEENT: Pupils equal, round, reactive to light; extraocular movements intact; sclerae nonicteric; pharynx, ear canals and TMs normal.  Neck: Without JVD, thyromegaly, bruit, or adenopathy.  Lungs: Clear to auscultation in all fields.  Heart: Regular rate and rhythm without murmur, rub, gallop, or click.  Abdomen: Soft, nontender, without hepatosplenomegaly or hernia.  Bowel sounds normal.  : Deferred.  Rectal: Deferred.  Extremities: Without clubbing, cyanosis, edema, or pulse deficit.  Neurologic: Intact without focal deficit.  Normal station and gait observed during ingress and egress from the examination room.  Skin: Without significant lesion.  Musculoskeletal: Unremarkable.    Lab/other results:    CBC normal.  CPK normal at 43.  CMP normal.  Hemoglobin A1c 6.5.  Total cholesterol 185, triglycerides 70, LDL particle #1199, HDL particle #39.5.  Thyroid function tests are normal.  Vitamin D is " low at 21.9.  Urinalysis not obtained because patient was unable to produce urine.    Assessment/Plan:     Diagnosis Plan   1. Impaired fasting glucose  Comprehensive Metabolic Panel    Hemoglobin A1c      2. Hyperlipidemia  CK    Comprehensive Metabolic Panel    NMR LipoProfile      3. Primary hypothyroidism  TSH    T4, Free    T3, Free      4. Vitamin D deficiency  Vitamin D,25-Hydroxy    vitamin D3 125 MCG (5000 UT) capsule capsule      5. Benign essential hypertension  Comprehensive Metabolic Panel      6. Carotid artery plaque, 6/13/2019--mild bilateral.  05/30/2017--16-49% left.  Mild plaque right.  05/30/2014--mild bilateral carotid plaque.        7. Hypercalcemia  Comprehensive Metabolic Panel      8. Gross hematuria  Urinalysis With Microscopic If Indicated (No Culture) - Urine, Clean Catch    Urinalysis With Microscopic If Indicated (No Culture) - Urine, Clean Catch      9. Situational anxiety        10. Osteopenia of multiple sites        11. Postmenopausal state        12. Pancreatic lesion, 6/13/2019--dilated pancreatic side duct.  No mass.  No further follow-up.  4 mm, repeat study due June 2019        13. Irritable bowel syndrome, unspecified type        14. Irritable bladder        15. Open-angle glaucoma of both eyes, unspecified glaucoma stage, unspecified open-angle glaucoma type        16. Therapeutic drug monitoring  CBC (No Diff)      17. History of 2019 novel coronavirus disease (COVID-19)  SARS-CoV-2 Antibodies, Nucleocapsid (Natural Immunity)      18. Primary osteoarthritis involving multiple joints  meloxicam (Mobic) 7.5 MG tablet        It appears that patient's impaired fasting glucose may have evolved into mild overt diabetes but I am reluctant to give patient that diagnosis based on 1 hemoglobin A1c reading.  We will continue to monitor and I have encouraged patient to watch her carbohydrate intake but I am not going to restrict her severely.  Hyperlipidemia is under reasonable control  "with Lipitor.  Her thyroid is therapeutic on the current dose of levothyroxine.  Her vitamin D is low and I encouraged her to be compliant with her vitamin D supplementation.  Hypertension appears to be controlled on the current hydrochlorothiazide and metoprolol.  Hypercalcemia is not evident on today's labs and we will continue to monitor.  Patient presented a while back with what she thought might be some gross hematuria.  I requested the urinalysis on more than 1 occasion but patient has not been able to produce any urine.  She has not noticed any \"pink urine\" since that time.  I explained to her I do think that we need to collect a urine sample as soon as we possibly can.  Situational anxiety seems to be under reasonable control.  I will not go into details.  Patient has osteopenia and is postmenopausal and we have elected to discontinue further DEXA scans due to her age and the mild nature of the osteopenia.  The pancreatic lesion has been followed for some time and felt to be a dilated pancreatic side duct with no further study indicated.  IBS seems to be stable.  Her irritable bladder is being treated with Detrol LA which is helpful.  She has glaucoma and is followed by the ophthalmologist.    Plan is as follows: No change in current medical regimen.  Patient should be compliant with vitamin D supplementation 5000 units/day.  Have recommended RSV vaccine as well as Shingrix at the local drugstore.  I have advised against COVID-vaccine as patient had COVID back in November of last year and her symptoms were mild and self-limited.  Patient will follow-up on or after September 19, 2024 for her Medicare wellness visit.  Otherwise she will follow-up as needed.    Addendum: Patient reports that her generalized osteoarthritis is causing her quite a bit of discomfort.  She is not taking any anti-inflammatory medication.  She takes Tylenol which may or may not help.  After reviewing the entire situation I think the " risk of a small dose of meloxicam is warranted.  Meloxicam 7.5 mg p.o. daily.      Procedures

## 2024-03-16 LAB
APPEARANCE UR: ABNORMAL
BACTERIA #/AREA URNS HPF: ABNORMAL /[HPF]
BILIRUB UR QL STRIP: NEGATIVE
CASTS URNS QL MICRO: ABNORMAL /LPF
COLOR UR: YELLOW
EPI CELLS #/AREA URNS HPF: ABNORMAL /HPF (ref 0–10)
GLUCOSE UR QL STRIP: NEGATIVE
HGB UR QL STRIP: ABNORMAL
KETONES UR QL STRIP: NEGATIVE
LEUKOCYTE ESTERASE UR QL STRIP: ABNORMAL
MICRO URNS: ABNORMAL
NITRITE UR QL STRIP: POSITIVE
PH UR STRIP: 6 [PH] (ref 5–7.5)
PROT UR QL STRIP: ABNORMAL
RBC #/AREA URNS HPF: ABNORMAL /HPF (ref 0–2)
SP GR UR STRIP: 1.02 (ref 1–1.03)
UROBILINOGEN UR STRIP-MCNC: 0.2 MG/DL (ref 0.2–1)
WBC #/AREA URNS HPF: >30 /HPF (ref 0–5)

## 2024-03-18 DIAGNOSIS — R82.71 BACTERIURIA WITH PYURIA: Primary | ICD-10-CM

## 2024-03-18 DIAGNOSIS — R82.81 BACTERIURIA WITH PYURIA: Primary | ICD-10-CM

## 2024-03-21 ENCOUNTER — TELEPHONE (OUTPATIENT)
Dept: INTERNAL MEDICINE | Facility: CLINIC | Age: 89
End: 2024-03-21

## 2024-03-21 NOTE — TELEPHONE ENCOUNTER
Caller: Stephanie Hawkins    Relationship: Self    Best call back number: [phone]     What was the call regarding: PATIENT WANTED TO LET DR SIMS KNOW THAT SHE WAS FINALLY ABLE TO GET A URINE SAMPLE AND SHE IS GOING TO DROP IT OFF TODAY.

## 2024-03-25 DIAGNOSIS — N39.0 CHRONIC LOWER URINARY TRACT INFECTION: Primary | ICD-10-CM

## 2024-03-25 RX ORDER — CIPROFLOXACIN 500 MG/1
TABLET, FILM COATED ORAL
Qty: 10 TABLET | Refills: 0 | Status: SHIPPED | OUTPATIENT
Start: 2024-03-25

## 2024-04-23 DIAGNOSIS — I10 BENIGN ESSENTIAL HYPERTENSION: Chronic | ICD-10-CM

## 2024-04-23 DIAGNOSIS — E78.2 MIXED HYPERLIPIDEMIA: Chronic | ICD-10-CM

## 2024-04-23 RX ORDER — HYDROCHLOROTHIAZIDE 25 MG/1
TABLET ORAL
Qty: 90 TABLET | Refills: 1 | Status: SHIPPED | OUTPATIENT
Start: 2024-04-23

## 2024-04-23 RX ORDER — ATORVASTATIN CALCIUM 40 MG/1
TABLET, FILM COATED ORAL
Qty: 90 TABLET | Refills: 1 | Status: SHIPPED | OUTPATIENT
Start: 2024-04-23

## 2024-04-23 NOTE — TELEPHONE ENCOUNTER
Rx Refill Note  Requested Prescriptions     Pending Prescriptions Disp Refills    atorvastatin (LIPITOR) 40 MG tablet [Pharmacy Med Name: ATORVASTATIN TABS 40MG] 90 tablet 1     Sig: TAKE 1 TABLET DAILY FOR HIGH CHOLESTEROL    hydroCHLOROthiazide 25 MG tablet [Pharmacy Med Name: HYDROCHLOROTHIAZIDE TABS 25MG] 90 tablet 1     Sig: TAKE 1 TABLET DAILY FOR HIGH BLOOD PRESSURE      Last office visit with prescribing clinician: 3/15/2024   Last telemedicine visit with prescribing clinician: Visit date not found   Next office visit with prescribing clinician: 9/24/2024                         Would you like a call back once the refill request has been completed: [] Yes [] No    If the office needs to give you a call back, can they leave a voicemail: [] Yes [] No    Lurdes Roy MA  04/23/24, 07:49 EDT

## 2024-05-09 DIAGNOSIS — I10 BENIGN ESSENTIAL HYPERTENSION: Chronic | ICD-10-CM

## 2024-05-09 RX ORDER — AMLODIPINE AND BENAZEPRIL HYDROCHLORIDE 5; 40 MG/1; MG/1
CAPSULE ORAL
Qty: 90 CAPSULE | Refills: 3 | Status: SHIPPED | OUTPATIENT
Start: 2024-05-09

## 2024-06-03 ENCOUNTER — TELEPHONE (OUTPATIENT)
Dept: ORTHOPEDIC SURGERY | Facility: CLINIC | Age: 89
End: 2024-06-03
Payer: MEDICARE

## 2024-06-03 NOTE — TELEPHONE ENCOUNTER
Line rang 1 time and went to . LVM stating  to utilize ice or heat, elevation to help with any swelling, and/or topical ointment such as Biofreeze, IcyHot etc. They can also use a cane while walking to help reduce pressure on the knees

## 2024-06-03 NOTE — TELEPHONE ENCOUNTER
Looks like she just had an injection in the right knee beginning of May with [facility], she cannot have another one at this time. In the meantime she can utilize ice or heat, elevation to help with any swelling, and/or topical ointment such as Biofreeze, IcyHot etc. They can also use a cane while walking to help reduce pressure on the knees. Thank you!

## 2024-06-03 NOTE — TELEPHONE ENCOUNTER
Hub staff attempted to follow warm transfer process and was unsuccessful     Caller: TAJ     Relationship to patient: SELF    Best call back number: [phone]    Patient is needing: PATIENT IS IN A LOT OF PAIN WITH RIGHT LEG AND WOULD LIKE TO KNOW IF THERE IS ANYTHING SHE CAN DO BETWEEN NOW AND WHEN HER APPOINTMENT DATE?  PLEASE REACH OUT AND ADVISE

## 2024-06-06 ENCOUNTER — TELEPHONE (OUTPATIENT)
Dept: ORTHOPEDIC SURGERY | Facility: CLINIC | Age: 89
End: 2024-06-06

## 2024-06-06 ENCOUNTER — TELEPHONE (OUTPATIENT)
Dept: INTERNAL MEDICINE | Facility: CLINIC | Age: 89
End: 2024-06-06

## 2024-06-06 NOTE — TELEPHONE ENCOUNTER
Provider: MARK    Caller: PATIENT    Pharmacy: [facility] [phone]    Phone Number: [phone]    Reason for Call: PATIENT WAS SCHEDULED WITH DR. FLORES ON 06/14/24, HAS BEEN MOVED TO [facility]VALDO'S SCHEDULE ON 06/11/24. PATIENT STATES SHE IS IN A LOT OF PAIN, AND IS ASKING IF SOMEONE CAN SEND IN A PRESCRIPTION TO HELP UNTIL SHE CAN BE SEEN.

## 2024-06-06 NOTE — TELEPHONE ENCOUNTER
Caller: Stephanie Hawkins    Relationship: Self    Best call back number: [place] Stephanie L     Requested Prescriptions: PAIN MEDICATION  Requested Prescriptions      No prescriptions requested or ordered in this encounter        [facility] [address] [place] [address] [place] - [phone] [facility] [phone]  [phone]        Last office visit with prescribing clinician: 3/15/2024   Last telemedicine visit with prescribing clinician: Visit date not found   Next office visit with prescribing clinician: 9/24/2024     Additional details provided by patient: PATIENT IS CALLING TO STATE SHE HAS EXTREME PAIN AND THINKS IT IS ARTHRITIS.  SHE STATES SHE HAS TRIED OVER THE COUNTER PAIN MEDICATIONS AND THEY DO NOT HELP.  SHE IS WANTING TO KNOW IF DR SIMS WOULD PRESCRIBE A PIN MEDICATION.  SHE STATES CODEINE MAKES HER VERY NAUSEATED.    Does the patient have less than a 3 day supply:  [x] Yes  [] No    Would you like a call back once the refill request has been completed: [] Yes [] No    If the office needs to give you a call back, can they leave a voicemail: [] Yes [] No    Leslee Cobos Rep   06/06/24 13:56 EDT     PLEASE ADVISE.

## 2024-06-07 ENCOUNTER — OFFICE VISIT (OUTPATIENT)
Dept: INTERNAL MEDICINE | Facility: CLINIC | Age: 89
End: 2024-06-07
Payer: MEDICARE

## 2024-06-07 VITALS
OXYGEN SATURATION: 97 % | HEIGHT: 62 IN | SYSTOLIC BLOOD PRESSURE: 110 MMHG | DIASTOLIC BLOOD PRESSURE: 72 MMHG | HEART RATE: 95 BPM | TEMPERATURE: 96.5 F | BODY MASS INDEX: 27.61 KG/M2

## 2024-06-07 DIAGNOSIS — Z51.81 THERAPEUTIC DRUG MONITORING: ICD-10-CM

## 2024-06-07 DIAGNOSIS — M17.11 PRIMARY OSTEOARTHRITIS OF RIGHT KNEE: ICD-10-CM

## 2024-06-07 DIAGNOSIS — G89.29 CHRONIC PAIN OF RIGHT KNEE: Primary | ICD-10-CM

## 2024-06-07 DIAGNOSIS — M25.561 CHRONIC PAIN OF RIGHT KNEE: Primary | ICD-10-CM

## 2024-06-07 PROCEDURE — 1159F MED LIST DOCD IN RCRD: CPT | Performed by: INTERNAL MEDICINE

## 2024-06-07 PROCEDURE — 99214 OFFICE O/P EST MOD 30 MIN: CPT | Performed by: INTERNAL MEDICINE

## 2024-06-07 PROCEDURE — 1160F RVW MEDS BY RX/DR IN RCRD: CPT | Performed by: INTERNAL MEDICINE

## 2024-06-07 PROCEDURE — 1125F AMNT PAIN NOTED PAIN PRSNT: CPT | Performed by: INTERNAL MEDICINE

## 2024-06-07 RX ORDER — PREDNISONE 10 MG/1
TABLET ORAL
Qty: 56 TABLET | Refills: 0 | Status: SHIPPED | OUTPATIENT
Start: 2024-06-07

## 2024-06-07 NOTE — PROGRESS NOTES
06/07/2024    Patient Information  Stephanie Hawkins                                                                                          [id number]  [facility] [address]      12/16/1932  [unfilled]  There is no work phone number on file.    Chief Complaint:       History of Present Illness:      ROS    Active Problems:    Patient Active Problem List   Diagnosis    Benign essential hypertension    Carotid artery plaque, 6/13/2019--mild bilateral.  05/30/2017--16-49% left.  Mild plaque right.  05/30/2014--mild bilateral carotid plaque.    Diverticulosis of colon    Heart palpitations    Hyperlipidemia    Irritable bowel syndrome    Primary osteoarthritis of left knee    Osteopenia of multiple sites    Vitamin D deficiency    Therapeutic drug monitoring    Glaucoma, bilateral    Irritable bladder    Primary hypothyroidism    Impaired fasting glucose    Pancreatic lesion, 6/13/2019--dilated pancreatic side duct.  No mass.  No further follow-up.  4 mm, repeat study due June 2019    Postmenopausal state    Situational anxiety    Gross hematuria    Primary osteoarthritis of left hip    History of squamous cell carcinoma of skin of the face    Primary osteoarthritis of right knee    Chronic pain of right knee         Past Medical History:   Diagnosis Date    Benign essential hypertension 02/26/2001 02/26/2001--patient was seen as a new patient. She was ordered being treated for hypertension at that time.    Carotid artery plaque, 6/13/2019--mild bilateral.  05/30/2017--16-49% left.  Mild plaque right.  05/30/2014--mild bilateral carotid plaque. 01/08/2002 June 13, 2019--carotid Doppler study reveals mild bilateral carotid plaque.  No percentage numbers given.  05/30/2017--carotid Doppler study reveals right plaque without stenosis.  There is 16-49% stenosis of the left ICA.  05/30/2014--vascular screen reveals mild bilateral carotid plaque, negative for AAA, negative for PAD.   06/19/2012--vascular  screen revealed bilateral, less than 50% internal     Chronic left hip pain 06/25/2020 June 25, 2020--patient reports that she has had intermittent left hip pain for several years now.  It may or may not be getting worse and we discussed orthopedic referral which we will hold off at the present time.  Instead we will obtain an x-ray and depending upon the result and her clinical course will determine orthopedic referral.    Chronic left hip pain 06/25/2020 June 25, 2020--patient reports that she has had intermittent left hip pain for several years now.  It may or may not be getting worse and we discussed orthopedic referral which we will hold off at the present time.  Instead we will obtain an x-ray and depending upon the result and her clinical course will determine orthopedic referral.      Diverticulosis of colon 10/16/2001    11/03/2006--the entire examined colon was normal except for scattered diverticuli.   10/16/2001--colonoscopy revealed a few left-sided scattered diverticuli.    Glaucoma, bilateral 06/01/2016 06/01/2016--routine follow-up.  She reports she was diagnosed with glaucoma about a month or so ago treated with Travatan eyedrops.    Heart palpitations 10/13/2008    Patient has at a long history of heart palpitations.   10/13/2008--24-hour Holter monitor reveals an average heart rate of 69, a minimum heart rate was 55. Maximum heart rate was 98. There were no pauses. Ventricular ectopy was zero. Supraventricular ectopy was 5585, with 15 supraventricular runs. Supraventricular bigeminy events were 6 and supraventricular trigeminy events were one. There was no atrial fibrillation/flutter. No ST segment changes. Patient's heart palpitations related to frequent PACs which have been controlled with atenolol.    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      Hyperlipidemia 02/26/2001 02/26/2001--patient was seen as a new patient. She was already on cholesterol medication at that time.    Impaired fasting glucose 06/14/2018 06/14/2018--routine follow-up.  Fasting serum glucose mildly elevated at 102.  Recommend low carbohydrate diet and weight loss.  Exercise.    Irritable bladder 08/29/2017 08/29/2017--patient seen in follow-up after recent urinary tract infection with complaints of nocturia ×4-5 per night as well as frequency and urgency.  Repeat urinalysis is negative.  Urine culture sent.  Given the fact the patient is had the urinary symptoms prior to the onset of the recent UTI, I will treat her empirically for irritable bladder.  Myrbetriq 25 mg per day.  May increase to 50 mg per day if necessary.  I will have her follow-up in about 3 weeks to reassess his situation.    Irritable bowel syndrome 04/27/2016    Menopausal state 05/17/2017    Osteopenia, 06/20/2018--lumbar spine 0.4.  Right femoral neck -1.1.  Left femoral neck -0.8.  05/30/2014--lumbar spine 0.6.  Right femoral neck -1.2.  Left femoral neck -1.7. 06/09/2006 06/20/2018--DEXA scan reveals average lumbar T score 0.4.  Right femoral neck T score -1.1.  Left femoral neck T score -0.8.  Assessment is osteopenia and compared to previous study in 2016 there is been no change in the right femoral neck and 17.2% improvement in the left femoral neck and a 2.1% decrease in the lumbar spine.  05/30/2014--DEXA scan reveals average lumbar spine T score of 0.6. Right femoral neck T score -1.2. Left femoral neck T score -1.7. Osteopenia. Compared to the previous examination of 11/09/2010, there is a 1% decrease in the left hip bone mineral density, 2.1% decrease in the right hip bone density, and a 1.6% improvement in the lumbar spine bone density.   11/22/2010--treatment for osteopenia begun with Fosamax but patient discontinued it on her own.   11/09/2010--DEXA scan  "revealed average lumbar spine T score 0.5. Left femoral neck T score -1.4. Right femoral neck T score -1.1. Osteopenia.   06/09/2006--DEXA scan reveals lumbar spine T score 0.6. Left hip T score -0.8, left     Pancreatic lesion, 4 mm, repeat study due June 2019 06/27/2018 11/27/2018--patient seen in follow-up and reports she is having urinary frequency without dysuria, but no incontinence and occasionally has some urgency.  She has no pain at the present time.  Apparently she saw a gynecologist and he did not examine her because \"we do not do Pap smears on a woman year age\".  06/27/2018--patient seen in follow-up and the results of the CT scan discussed.  She curre    Possible Pelvic congestion syndrome 06/27/2018 11/27/2018--patient seen in follow-up and reports she is having urinary frequency without dysuria, but no incontinence and occasionally has some urgency.  She has no pain at the present time.  Apparently she saw a gynecologist and he did not examine her because \"we do not do Pap smears on a woman year age\".  06/27/2018--patient seen in follow-up and the results of the CT scan discussed.  She curre    Primary hypothyroidism 12/05/2017    May 28, 2019--routine follow-up.  TSH slightly elevated at 4.56.  Free T3 and free T4 are normal.  Continued monitoring.  12/05/2017--routine follow-up.  TSH elevated at 5.19.  Free T3 and free T4 are normal.  Repeat thyroid function tests ordered.  Thyroid antibodies ordered.  If these confirm diagnosis of hypothyroidism and we will proceed with thyroid ultrasound.    Primary osteoarthritis of left hip 02/21/2024 June 25, 2020--patient reports that she has had intermittent left hip pain for several years now.  It may or may not be getting worse and we discussed orthopedic referral which we will hold off at the present time.  Instead we will obtain an x-ray and depending upon the result and her clinical course will determine orthopedic referral.      Primary " osteoarthritis of left knee 03/23/2004 03/23/2004--patient was evaluated by the orthopedist for left knee pain. X-rays revealed osteoarthritis with some medial joint space narrowing, subchondral sclerosis, and patellofemoral spurs. Cortisone injections were given.    Situational anxiety 11/08/2023    Subclinical hypothyroidism 12/05/2017    May 28, 2019--routine follow-up.  TSH slightly elevated at 4.56.  Free T3 and free T4 are normal.  Continued monitoring.  12/05/2017--routine follow-up.  TSH elevated at 5.19.  Free T3 and free T4 are normal.  Repeat thyroid function tests ordered.  Thyroid antibodies ordered.  If these confirm diagnosis of hypothyroidism and we will proceed with thyroid ultrasound.    Vitamin D deficiency 04/27/2016         Past Surgical History:   Procedure Laterality Date    CATARACT EXTRACTION Right 01/2014 January 2014--cataract extirpation and intraocular lens implantation right eye.     CATARACT EXTRACTION Left 09/2010 September 2010--cataract extirpation and intraocular lens implantation in the left eye.    COLONOSCOPY  11/03/2006 11/03/2006--the entire examined colon was normal except for scattered diverticuli.     COLONOSCOPY  10/16/2001    10/16/2001--colonoscopy revealed a few left-sided scattered diverticuli.    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         Allergies   Allergen Reactions    Neomycin-Bacitracin Zn-Polymyx Swelling     Neosporin    Sulfa Antibiotics Hives    Penicillins Unknown - Low Severity           Current Outpatient Medications:     amLODIPine-benazepril (LOTREL) 5-40 MG per capsule, TAKE 1 CAPSULE DAILY, Disp: 90 capsule, Rfl: 3    Aspirin 81 MG capsule, Take 81 mg by mouth Daily., Disp: , Rfl:     atorvastatin (LIPITOR) 40 MG tablet, TAKE 1 TABLET DAILY FOR HIGH CHOLESTEROL, Disp: 90 tablet, Rfl: 1     "hydroCHLOROthiazide 25 MG tablet, TAKE 1 TABLET DAILY FOR HIGH BLOOD PRESSURE, Disp: 90 tablet, Rfl: 1    levothyroxine (Synthroid) 50 MCG tablet, Take 1 p.o. every morning for low thyroid., Disp: 90 tablet, Rfl: 3    metoprolol succinate XL (TOPROL-XL) 25 MG 24 hr tablet, TAKE 1 TABLET EVERY MORNING FOR HIGH BLOOD PRESSURE, HEART AND PALPITATIONS, Disp: 90 tablet, Rfl: 3    Multiple Vitamins-Minerals (PRESERVISION AREDS PO), Take  by mouth Every Night., Disp: , Rfl:     multivitamin (MULTI VITAMIN DAILY PO), Take 1 p.o. daily, Disp: , Rfl:     timolol (TIMOPTIC) 0.5 % ophthalmic solution, Apply as directed to the affected eye for glaucoma, Disp:  , Rfl:     vitamin D3 125 MCG (5000 UT) capsule capsule, 1 by mouth daily as directed, Disp: , Rfl:     predniSONE (DELTASONE) 10 MG tablet, 5 by mouth daily 7 days, then 4 daily 2 days, 3 daily 2 days, 2 daily 2 days, 1 daily 2 days, one half daily 2 days and discontinue., Disp: 56 tablet, Rfl: 0      Family History   Problem Relation Age of Onset    Cirrhosis Mother         Of Liver. Mother  at age 62 from alcoholic cirrhosis.    Heart attack Father         Acute Myocardial Infarction.  Father  of a myocardial infarction at age 68.         Social History     Socioeconomic History    Marital status:      Spouse name: Az    Number of children: 3    Highest education level: Bachelor's degree (e.g., BA, AB, BS)   Tobacco Use    Smoking status: Never    Smokeless tobacco: Never   Vaping Use    Vaping status: Never Used   Substance and Sexual Activity    Alcohol use: No    Drug use: No    Sexual activity: Not Currently     Partners: Male         Vitals:    24 1256   BP: 110/72   Pulse: 95   Temp: 96.5 °F (35.8 °C)   SpO2: 97%   Height: 157.5 cm (62.01\")        Body mass index is 27.61 kg/m².      Physical Exam:    General: Alert and oriented x 3.  No acute distress.  Normal affect.  HEENT: Pupils equal, round, reactive to light; extraocular movements " intact; sclerae nonicteric; pharynx, ear canals and TMs normal.  Neck: Without JVD, thyromegaly, bruit, or adenopathy.  Lungs: Clear to auscultation in all fields.  Heart: Regular rate and rhythm without murmur, rub, gallop, or click.  Abdomen: Soft, nontender, without hepatosplenomegaly or hernia.  Bowel sounds normal.  : Deferred.  Rectal: Deferred.  Extremities: Without clubbing, cyanosis, edema, or pulse deficit.  Neurologic: Intact without focal deficit.  Normal station and gait observed during ingress and egress from the examination room.  Skin: Without significant lesion.  Musculoskeletal: Patient has obvious end-stage osteoarthritis of both knees.  The right knee appears to be bone-on-bone and there is some tenderness just slightly distal to the anserine bursal area and along the medial joint line.  No definite effusion.  There is no overlying erythema or warmth.    Lab/other results:      Assessment/Plan:     Diagnosis Plan   1. Chronic pain of right knee  predniSONE (DELTASONE) 10 MG tablet    Uric Acid    C-reactive Protein      2. Primary osteoarthritis of right knee  predniSONE (DELTASONE) 10 MG tablet    Uric Acid    C-reactive Protein      3. Therapeutic drug monitoring  Uric Acid        Patient has chronic pain of the right knee that has gotten much worse.  She saw the orthopedist about a month ago and received a Kenalog injection.  It did not help at all.  The pain is gotten so severe that she cannot ambulate without assistance and currently is at a wheelchair.  Not a likely surgical candidate.  Very difficult situation.  Patient might benefit from PRP injection but this is not covered by Medicare is a still consider this to be experimental.    Plan is as follows: Prednisone 50 mg p.o. daily x 7 days, taper and discontinue.  She had x-rays recently and I reviewed those.  This was done at [facility].  Check uric acid since patient is on diuretic therapy to rule out hyperuricemia.  I will  "have her follow-up in about 7 to 10 days to reassess.  At that time may we can come up with a longer-term game plan.  Patient gets deathly ill with codeine I am reluctant to give her hydrocodone.    This patient has a PCP that is the continuing focal point for all health care services, and the patient sees this physician to be evaluated for knee pain. The inherent complexity that this code () captures is not in the clinical condition itself, but rather the cognitition of the continued responsibility of being the focal point for all needed services for this patient.\"       Procedures        "

## 2024-06-08 LAB — CRP SERPL-MCNC: 1 MG/L (ref 0–10)

## 2024-06-11 ENCOUNTER — TELEPHONE (OUTPATIENT)
Dept: ORTHOPEDIC SURGERY | Facility: CLINIC | Age: 89
End: 2024-06-11

## 2024-06-24 ENCOUNTER — TELEPHONE (OUTPATIENT)
Dept: INTERNAL MEDICINE | Facility: CLINIC | Age: 89
End: 2024-06-24

## 2024-06-24 NOTE — TELEPHONE ENCOUNTER
Caller: Stephanie Hawkins    Relationship: Self    Best call back number: [phone]     What is the best time to reach you: ANYTIME    Who are you requesting to speak with (clinical staff, provider,  specific staff member):         What was the call regarding: PATIENT FINISHED HER STEROID WHICH WAS PREDNISONE. PATIENT IS STILL UNABLE TO WALK DUE WITHOUT LIMPING DUE  TO THE SEVERE ARTHRITIS.    PATIENT WANTED TO KNOW WHAT NEEDS TO BE DONE FROM HERE?    PLEASE CALL.

## 2024-06-25 ENCOUNTER — TELEPHONE (OUTPATIENT)
Dept: ORTHOPEDIC SURGERY | Facility: CLINIC | Age: 89
End: 2024-06-25
Payer: MEDICARE

## 2024-06-25 NOTE — TELEPHONE ENCOUNTER
Caller: Stephanie Hawkins    Relationship to patient: Self    Best call back number: [phone]*    Patient is needing: PT IS STATING THAT HER RIGHT KNEE IS GIVING HER EXTREME PAIN, PT IS AWARE THAT SHE CAN NOT HAVE ANOTHER INJECTION DUE TO HAVING ONE IN EARLY MAY BUT IS WANTING TO KNOW IF THERE IS ANYTHING ELSE SHE CAN DO TO HELP WITH THE PAIN.. PLEASE ADVISE..

## 2024-06-25 NOTE — TELEPHONE ENCOUNTER
"Relay     \"  About all I can do is refer her to an orthopedic in regards to her knee pain.  I am not sure if she has an orthopedic but we need to find out and if she is agreeable I will place a referral.   \"        "

## 2024-06-26 NOTE — TELEPHONE ENCOUNTER
Spoke with pt and explained to her that [facility] has not seen her as a pt she she cannot make any recommendations.  I informed her of [facility] advice and she voiced understanding.  Appt was scheduled for 8/6/24 @ 1:00.

## 2024-07-01 ENCOUNTER — TELEPHONE (OUTPATIENT)
Dept: INTERNAL MEDICINE | Facility: CLINIC | Age: 89
End: 2024-07-01

## 2024-07-01 DIAGNOSIS — M25.552 CHRONIC LEFT HIP PAIN: Primary | ICD-10-CM

## 2024-07-01 DIAGNOSIS — G89.29 CHRONIC LEFT HIP PAIN: Primary | ICD-10-CM

## 2024-07-01 NOTE — TELEPHONE ENCOUNTER
Caller: Stephanie Hawkins    Relationship: Self    Best call back number: [phone]     What medication are you requesting: PAIN MEDICATION    What are your current symptoms: SEVERE ARTHRITIS PAIN RIGHT LEG, CAN'T WALK    How long have you been experiencing symptoms: 1 WEEK    Have you had these symptoms before:    [x] Yes  [] No    Have you been treated for these symptoms before:   [x] Yes  [] No    If a prescription is needed, what is your preferred pharmacy and phone number: [address] [phone] [facility] [phone]      Additional notes: PATIENT STATES SHE CAN NOT TAKE CODEINE. PATIENT STATES SHE FINISHED THE STEROID.

## 2024-07-02 ENCOUNTER — TELEPHONE (OUTPATIENT)
Dept: INTERNAL MEDICINE | Facility: CLINIC | Age: 89
End: 2024-07-02
Payer: MEDICARE

## 2024-07-10 ENCOUNTER — HOME HEALTH ADMISSION (OUTPATIENT)
Dept: HOME HEALTH SERVICES | Facility: HOME HEALTHCARE | Age: 89
End: 2024-07-10
Payer: MEDICARE

## 2024-07-10 ENCOUNTER — OFFICE VISIT (OUTPATIENT)
Dept: ORTHOPEDIC SURGERY | Facility: CLINIC | Age: 89
End: 2024-07-10
Payer: MEDICARE

## 2024-07-10 VITALS — TEMPERATURE: 97.8 F | HEIGHT: 62 IN | BODY MASS INDEX: 27.6 KG/M2 | WEIGHT: 150 LBS

## 2024-07-10 DIAGNOSIS — R52 PAIN: Primary | ICD-10-CM

## 2024-07-10 DIAGNOSIS — M17.0 PRIMARY OSTEOARTHRITIS OF BOTH KNEES: ICD-10-CM

## 2024-07-10 RX ORDER — METHYLPREDNISOLONE ACETATE 80 MG/ML
80 INJECTION, SUSPENSION INTRA-ARTICULAR; INTRALESIONAL; INTRAMUSCULAR; SOFT TISSUE
Status: COMPLETED | OUTPATIENT
Start: 2024-07-10 | End: 2024-07-10

## 2024-07-10 RX ADMIN — METHYLPREDNISOLONE ACETATE 80 MG: 80 INJECTION, SUSPENSION INTRA-ARTICULAR; INTRALESIONAL; INTRAMUSCULAR; SOFT TISSUE at 12:22

## 2024-07-10 NOTE — PROGRESS NOTES
Patient Name: Stephanie Hawkins   YOB: 1932  Referring Primary Care Physician: Jeovany Dietrich MD  BMI: Body mass index is 27.44 kg/m².    Chief Complaint:    Chief Complaint   Patient presents with    Right Knee - Pain        HPI: New pt to me that presents with severe knee pain in both knees and pt has not been able to ambulate. 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  Patient had a cortisone injection in the past with Dr. Jacobson if anyone does not want to entertain the thought of doing a total knee replacement and would like to have options for treatment plans to see if she can remain mobile in her home living independently.  History of osteopenia no history of back problems  Stephanie Hawkins is a 89 y.o. female who presents today for evaluation of   Chief Complaint   Patient presents with    Right Knee - Pain         Subjective   Medications:   Home Medications:  Current Outpatient Medications on File Prior to Visit   Medication Sig    amLODIPine-benazepril (LOTREL) 5-40 MG per capsule TAKE 1 CAPSULE DAILY    atorvastatin (LIPITOR) 40 MG tablet TAKE 1 TABLET DAILY FOR HIGH CHOLESTEROL    GARLIC PO Take  by mouth.    hydroCHLOROthiazide 25 MG tablet TAKE 1 TABLET DAILY FOR HIGH BLOOD PRESSURE    levothyroxine (Synthroid) 50 MCG tablet Take 1 p.o. every morning for low thyroid.    metoprolol succinate XL (TOPROL-XL) 25 MG 24 hr tablet TAKE 1 TABLET EVERY MORNING FOR HIGH BLOOD PRESSURE, HEART AND PALPITATIONS    Multiple Vitamins-Minerals (PRESERVISION AREDS PO) Take  by mouth Every Night.    multivitamin (MULTI VITAMIN DAILY PO) Take 1 p.o. daily    timolol (TIMOPTIC) 0.5 % ophthalmic solution Apply as directed to the affected eye for glaucoma    vitamin D3 125 MCG (5000 UT) capsule capsule 1 by mouth daily as directed    Aspirin 81 MG capsule Take 81 mg by mouth Daily. (Patient not taking: Reported on 7/10/2024)    predniSONE  (DELTASONE) 10 MG tablet 5 by mouth daily 7 days, then 4 daily 2 days, 3 daily 2 days, 2 daily 2 days, 1 daily 2 days, one half daily 2 days and discontinue. (Patient not taking: Reported on 7/10/2024)     No current facility-administered medications on file prior to visit.     Current Medications:  Scheduled Meds:  Continuous Infusions:No current facility-administered medications for this visit.    PRN Meds:.    I have reviewed the patient's medical history in detail and updated the computerized patient record.  Review and summarization of old records includes:    Past Medical History:   Diagnosis Date    Benign essential hypertension 02/26/2001 02/26/2001--patient was seen as a new patient. She was ordered being treated for hypertension at that time.    Carotid artery plaque, 6/13/2019--mild bilateral.  05/30/2017--16-49% left.  Mild plaque right.  05/30/2014--mild bilateral carotid plaque. 01/08/2002 June 13, 2019--carotid Doppler study reveals mild bilateral carotid plaque.  No percentage numbers given.  05/30/2017--carotid Doppler study reveals right plaque without stenosis.  There is 16-49% stenosis of the left ICA.  05/30/2014--vascular screen reveals mild bilateral carotid plaque, negative for AAA, negative for PAD.   06/19/2012--vascular screen revealed bilateral, less than 50% internal     Chronic left hip pain 06/25/2020 June 25, 2020--patient reports that she has had intermittent left hip pain for several years now.  It may or may not be getting worse and we discussed orthopedic referral which we will hold off at the present time.  Instead we will obtain an x-ray and depending upon the result and her clinical course will determine orthopedic referral.    Chronic left hip pain 06/25/2020 June 25, 2020--patient reports that she has had intermittent left hip pain for several years now.  It may or may not be getting worse and we discussed orthopedic referral which we will hold off at the present  time.  Instead we will obtain an x-ray and depending upon the result and her clinical course will determine orthopedic referral.      Diverticulosis of colon 10/16/2001    11/03/2006--the entire examined colon was normal except for scattered diverticuli.   10/16/2001--colonoscopy revealed a few left-sided scattered diverticuli.    Glaucoma, bilateral 06/01/2016 06/01/2016--routine follow-up.  She reports she was diagnosed with glaucoma about a month or so ago treated with Travatan eyedrops.    Heart palpitations 10/13/2008    Patient has at a long history of heart palpitations.   10/13/2008--24-hour Holter monitor reveals an average heart rate of 69, a minimum heart rate was 55. Maximum heart rate was 98. There were no pauses. Ventricular ectopy was zero. Supraventricular ectopy was 5585, with 15 supraventricular runs. Supraventricular bigeminy events were 6 and supraventricular trigeminy events were one. There was no atrial fibrillation/flutter. No ST segment changes. Patient's heart palpitations related to frequent PACs which have been controlled with atenolol.    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      Hyperlipidemia 02/26/2001 02/26/2001--patient was seen as a new patient. She was already on cholesterol medication at that time.    Impaired fasting glucose 06/14/2018 06/14/2018--routine follow-up.  Fasting serum glucose mildly elevated at 102.  Recommend low carbohydrate diet and weight loss.  Exercise.    Irritable bladder 08/29/2017 08/29/2017--patient seen in follow-up after recent urinary tract infection with complaints of nocturia ×4-5 per night as well as frequency and urgency.  Repeat urinalysis is negative.  Urine culture sent.  Given the fact the patient is had the urinary symptoms prior to the onset of the recent UTI,  "I will treat her empirically for irritable bladder.  Myrbetriq 25 mg per day.  May increase to 50 mg per day if necessary.  I will have her follow-up in about 3 weeks to reassess his situation.    Irritable bowel syndrome 04/27/2016    Menopausal state 05/17/2017    Osteopenia, 06/20/2018--lumbar spine 0.4.  Right femoral neck -1.1.  Left femoral neck -0.8.  05/30/2014--lumbar spine 0.6.  Right femoral neck -1.2.  Left femoral neck -1.7. 06/09/2006 06/20/2018--DEXA scan reveals average lumbar T score 0.4.  Right femoral neck T score -1.1.  Left femoral neck T score -0.8.  Assessment is osteopenia and compared to previous study in 2016 there is been no change in the right femoral neck and 17.2% improvement in the left femoral neck and a 2.1% decrease in the lumbar spine.  05/30/2014--DEXA scan reveals average lumbar spine T score of 0.6. Right femoral neck T score -1.2. Left femoral neck T score -1.7. Osteopenia. Compared to the previous examination of 11/09/2010, there is a 1% decrease in the left hip bone mineral density, 2.1% decrease in the right hip bone density, and a 1.6% improvement in the lumbar spine bone density.   11/22/2010--treatment for osteopenia begun with Fosamax but patient discontinued it on her own.   11/09/2010--DEXA scan revealed average lumbar spine T score 0.5. Left femoral neck T score -1.4. Right femoral neck T score -1.1. Osteopenia.   06/09/2006--DEXA scan reveals lumbar spine T score 0.6. Left hip T score -0.8, left     Pancreatic lesion, 4 mm, repeat study due June 2019 06/27/2018 11/27/2018--patient seen in follow-up and reports she is having urinary frequency without dysuria, but no incontinence and occasionally has some urgency.  She has no pain at the present time.  Apparently she saw a gynecologist and he did not examine her because \"we do not do Pap smears on a woman year age\".  06/27/2018--patient seen in follow-up and the results of the CT scan discussed.  She curre    " "Possible Pelvic congestion syndrome 06/27/2018 11/27/2018--patient seen in follow-up and reports she is having urinary frequency without dysuria, but no incontinence and occasionally has some urgency.  She has no pain at the present time.  Apparently she saw a gynecologist and he did not examine her because \"we do not do Pap smears on a woman year age\".  06/27/2018--patient seen in follow-up and the results of the CT scan discussed.  She curre    Primary hypothyroidism 12/05/2017    May 28, 2019--routine follow-up.  TSH slightly elevated at 4.56.  Free T3 and free T4 are normal.  Continued monitoring.  12/05/2017--routine follow-up.  TSH elevated at 5.19.  Free T3 and free T4 are normal.  Repeat thyroid function tests ordered.  Thyroid antibodies ordered.  If these confirm diagnosis of hypothyroidism and we will proceed with thyroid ultrasound.    Primary osteoarthritis of left hip 02/21/2024 June 25, 2020--patient reports that she has had intermittent left hip pain for several years now.  It may or may not be getting worse and we discussed orthopedic referral which we will hold off at the present time.  Instead we will obtain an x-ray and depending upon the result and her clinical course will determine orthopedic referral.      Primary osteoarthritis of left knee 03/23/2004 03/23/2004--patient was evaluated by the orthopedist for left knee pain. X-rays revealed osteoarthritis with some medial joint space narrowing, subchondral sclerosis, and patellofemoral spurs. Cortisone injections were given.    Situational anxiety 11/08/2023    Subclinical hypothyroidism 12/05/2017    May 28, 2019--routine follow-up.  TSH slightly elevated at 4.56.  Free T3 and free T4 are normal.  Continued monitoring.  12/05/2017--routine follow-up.  TSH elevated at 5.19.  Free T3 and free T4 are normal.  Repeat thyroid function tests ordered.  Thyroid antibodies ordered.  If these confirm diagnosis of hypothyroidism and we will " proceed with thyroid ultrasound.    Vitamin D deficiency 2016        Past Surgical History:   Procedure Laterality Date    CATARACT EXTRACTION Right 2014--cataract extirpation and intraocular lens implantation right eye.     CATARACT EXTRACTION Left 2010--cataract extirpation and intraocular lens implantation in the left eye.    COLONOSCOPY  2006--the entire examined colon was normal except for scattered diverticuli.     COLONOSCOPY  10/16/2001    10/16/2001--colonoscopy revealed a few left-sided scattered diverticuli.    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        Social History     Occupational History    Occupation: Retired () [employer]   Tobacco Use    Smoking status: Never    Smokeless tobacco: Never   Vaping Use    Vaping status: Never Used   Substance and Sexual Activity    Alcohol use: No    Drug use: No    Sexual activity: Not Currently     Partners: Male      Social History     Social History Narrative    Not on file        Family History   Problem Relation Age of Onset    Cirrhosis Mother         Of Liver. Mother  at age 62 from alcoholic cirrhosis.    Heart attack Father         Acute Myocardial Infarction.  Father  of a myocardial infarction at age 68.       ROS: 14 point review of systems was performed and all other systems were reviewed and are negative except for documented findings in HPI and today's encounter.     Allergies:   Allergies   Allergen Reactions    Neomycin-Bacitracin Zn-Polymyx Swelling     Neosporin    Sulfa Antibiotics Hives    Penicillins Unknown - Low Severity     Constitutional:  Denies fever, shaking or chills   Eyes:  Denies change in visual acuity   HENT:  Denies nasal congestion or sore throat   Respiratory:  Denies cough or shortness of breath  "  Cardiovascular:  Denies chest pain or severe LE edema   GI:  Denies abdominal pain, nausea, vomiting, bloody stools or diarrhea   Musculoskeletal:  Numbness, tingling, pain, or loss of motor function only as noted above in history of present illness.  : Denies painful urination or hematuria  Integument:  Denies rash, lesion or ulceration   Neurologic:  Denies headache or focal weakness  Endocrine:  Denies lymphadenopathy  Psych:  Denies confusion or change in mental status   Hem:  Denies active bleeding    OBJECTIVE:  Physical Exam: 89 y.o. female  Wt Readings from Last 3 Encounters:   07/10/24 68 kg (150 lb)   03/15/24 68.5 kg (151 lb)   02/21/24 68.9 kg (152 lb)     Ht Readings from Last 1 Encounters:   07/10/24 157.5 cm (62\")     Body mass index is 27.44 kg/m².  Vitals:    07/10/24 1003   Temp: 97.8 °F (36.6 °C)     Vital signs reviewed.     General Appearance:    Alert, cooperative, in no acute distress                  Eyes: conjunctiva clear  ENT: external ears and nose atraumatic  CV: no peripheral edema  Resp: normal respiratory effort  Skin: no rashes or wounds; normal turgor  Psych: mood and affect appropriate  Lymph: no nodes appreciated  Neuro: gross sensation intact  Vascular:  Palpable peripheral pulse in noted extremity  Musculoskeletal Extremities: Skin warm dry intact with good pulses and sensation of both lower extremities medial joint line tenderness bilaterally with calves are soft and nontender severe patellofemoral crepitation and pain she has flexion contractures of both knees did not ambulate or stand for exam she is in a wheelchair    Radiology:   3 views of right knee to include the left that show severe end-stage tricompartmental osteoarthritis    Assessment:     ICD-10-CM ICD-9-CM   1. Pain  R52 780.96   2. Primary osteoarthritis of both knees  M17.0 715.16        Large Joint Arthrocentesis: L knee  Date/Time: 7/10/2024 12:22 PM  Consent given by: patient  Site marked: site " marked  Timeout: Immediately prior to procedure a time out was called to verify the correct patient, procedure, equipment, support staff and site/side marked as required   Supporting Documentation  Indications: pain and joint swelling   Procedure Details  Location: knee - L knee  Preparation: Patient was prepped and draped in the usual sterile fashion  Needle gauge: 21 G.  Approach: anterolateral  Medications administered: 2 mL lidocaine (cardiac); 80 mg methylPREDNISolone acetate 80 MG/ML  Patient tolerance: patient tolerated the procedure well with no immediate complications      Large Joint Arthrocentesis: R knee  Date/Time: 7/10/2024 12:22 PM  Consent given by: patient  Site marked: site marked  Timeout: Immediately prior to procedure a time out was called to verify the correct patient, procedure, equipment, support staff and site/side marked as required   Supporting Documentation  Indications: pain and joint swelling   Procedure Details  Location: knee - R knee  Preparation: Patient was prepped and draped in the usual sterile fashion  Needle gauge: 21 G.  Approach: anterolateral  Medications administered: 2 mL lidocaine (cardiac); 80 mg methylPREDNISolone acetate 80 MG/ML  Patient tolerance: patient tolerated the procedure well with no immediate complications        MDM/Plan:   The diagnosis(es), natural history, pathophysiology and treatment for diagnosis(es) were discussed. Opportunity given and questions answered.  Biomechanics of pertinent body areas discussed.  When appropriate, the use of ambulatory aids discussed.  EXERCISES:  Advice on benefits of, and types of regular/moderate exercise pertaining to orthopedic diagnosis(es).  MEDICATIONS:  The risks, benefits, warnings,side effects and alternatives of medications discussed.  Inflammation/pain control; with cold, heat, elevation and/or liniments discussed as appropriate  Cortisone Injection. See procedure note.  PT referral.  MEDICAL RECORDS reviewed  from other provider(s) for past and current medical history pertinent to this complaint.  30 minutes face to face with family    7/10/2024    Much of this encounter note is an electronic transcription/translation of spoken language to printed text. The electronic translation of spoken language may permit erroneous, or at times, nonsensical words or phrases to be inadvertently transcribed; Although I have reviewed the note for such errors, some may still exist

## 2024-07-11 ENCOUNTER — HOME CARE VISIT (OUTPATIENT)
Dept: HOME HEALTH SERVICES | Facility: HOME HEALTHCARE | Age: 89
End: 2024-07-11
Payer: MEDICARE

## 2024-07-11 ENCOUNTER — TELEPHONE (OUTPATIENT)
Dept: INTERNAL MEDICINE | Facility: CLINIC | Age: 89
End: 2024-07-11

## 2024-07-11 PROCEDURE — G0151 HHCP-SERV OF PT,EA 15 MIN: HCPCS

## 2024-07-11 PROCEDURE — G0152 HHCP-SERV OF OT,EA 15 MIN: HCPCS

## 2024-07-11 NOTE — Clinical Note
"Upon home health PT evaluation (order from her orthopedic MD), the med review disclosed she was not taking her 81 mg aspirin.  She did not remember why she quit taking it but thinks she quit because she was \"taking too much medicine.\"  I advised she continue to take it.  Overall, she seemed not to be managing her meds correctly and I had her use her mediplanner to better organize and I will reassess again.  Thank you. "

## 2024-07-11 NOTE — Clinical Note
"Home PT will see 2w3, 1w1.  Here is my brief eval.  Thank you for this referral.            Patient was referred by the orthopedic office due to immobility and pain from her B knee OA. She evidently had cortisone injections in May from a different ortho group and then pursued further treatment for the pain. She recieved B knee injections on 7/10/24. She presents today in her home using a transport wheelchair as her main mode of mobility. This is difficult and unsafe as she can not engage the brakes when she is getting in and out of the wheelchair and self-propelling it is difficult. She lives in a 2 story home but most things are on one level that she needs (her TV room is one step down and family in the process of moving it on the main level). She lives with her elderly  whom she states is physically able to assist her. Her right knee has more pain and dysfunction. She does not use anything consistently for pain. She states she has not tried to walk much on it for over 2 weeks. Prior to this, she states she just \"limped\" around using her environment to hold onto and no assitive device. I was able to locate a walker in the basement and able to do teaching with it today.    Assessment: Some basic mobility training with the walker seemed to help her manage her knee pain and be able to more functionally get around her house today. Even though she scored ok on the BIMs, she has some cognitive/short term memory deficits that will be a barrier (She was found not to be taking her meds appropriately). Home PT FOC will be her B knee OA and mobility retraining, pain management and home safety the focus. OT to assess her ADL safety and treat as needed.      "

## 2024-07-11 NOTE — TELEPHONE ENCOUNTER
Caller: Stephanie Hawkins    Relationship: Self    Best call back number: [phone]     What was the call regarding:   PATIENT INQUIRED ON HER SHINGLES VACCINE AND IF SHE NEEDS ANOTHER ONE.  PLEASE ADVISE

## 2024-07-12 VITALS
SYSTOLIC BLOOD PRESSURE: 158 MMHG | RESPIRATION RATE: 18 BRPM | HEART RATE: 75 BPM | DIASTOLIC BLOOD PRESSURE: 80 MMHG | TEMPERATURE: 98.2 F | OXYGEN SATURATION: 97 %

## 2024-07-12 NOTE — HOME HEALTH
"Patient was referred by the orthopedic office due to immobility and pain from her B knee OA.  She evidently had cortisone injections in May from a different ortho group and then pursued further treatment for the pain.  She recieved B knee injections on 7/10/24.  She presents today in her home using a transport wheelchair as her main mode of mobility.  This is difficult and unsafe as she can not engage the brakes when she is getting in and out of the wheelchair and self-propelling it is difficult.  She lives in a 2 story home but most things are on one level that she needs (her TV room is one step down and family in the process of moving it on the main level).  She lives with her elderly  whom she states is physically able to assist her.  Her right knee has more pain and dysfunction.  She does not use anything consistently for pain.  She states she has not tried to walk much on it for over 2 weeks.  Prior to this, she states she just \"limped\" around using her environment to hold onto and no assitive device.  I was able to locate a walker in the basement and able to do teaching with it today.  .     Assessment:  Some basic mobility training with the walker seemed to help her manage her knee pain and be able to more functionally get around her house today.  Even though she scored ok on the BIMs, she has some cognitive/short term memory deficits that will be a barrier (She was found not to be taking her meds appropriately).  Home PT FOC will be her B knee OA and mobility retraining, pain management and home safety the focus.  OT  to assess her ADL safety and treat as needed.      Plan for next visit  Continue gait training for best safety and to protect her knee from pain exacerbation  Pain management  Supine HEP  Med management"

## 2024-07-14 VITALS
HEART RATE: 75 BPM | DIASTOLIC BLOOD PRESSURE: 74 MMHG | SYSTOLIC BLOOD PRESSURE: 160 MMHG | TEMPERATURE: 98.2 F | OXYGEN SATURATION: 97 %

## 2024-07-14 NOTE — HOME HEALTH
CURRENT SITUATION: Pt referred to HH PT/OT from orthopedic office due to her c/o severe R > L Knee pain and functional decline in her mobilites. She is s/p B-knee arthrocentesis in their office. Her primary dx is OA of B-knees.   OT's FOCUS OF CARE: B-knee OA, s/p arthrocentesis/injections.  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 All needs are met on main level of home. Their adult son has recently moved back to [place] to assist w/their needs. He is in the process of flipping the TV room and dining room so pt will not have to use the 1-step into the sunken TV room.   INTERVENTIONS: OT Eval, ADL training, Home Safety, Therapeutic Exercise/Activity, Transfer/Mobility training, Monitor vitals including SPO2 via pulse-oximeter (notifiy MD if resting O2 <90% on room air), Patient/CG education, Falls-risk prevention, Recommendations on AE, DME, AD, environmental adaptations.

## 2024-07-15 ENCOUNTER — HOME CARE VISIT (OUTPATIENT)
Dept: HOME HEALTH SERVICES | Facility: HOME HEALTHCARE | Age: 89
End: 2024-07-15
Payer: MEDICARE

## 2024-07-15 PROCEDURE — G0151 HHCP-SERV OF PT,EA 15 MIN: HCPCS

## 2024-07-15 NOTE — Clinical Note
Patient looks to need more assist in her home for safety and the patient voiced she did not know where to get the assistance.  I added the medical social worker to provide assistance on help in the home for her.

## 2024-07-16 ENCOUNTER — HOME CARE VISIT (OUTPATIENT)
Dept: HOME HEALTH SERVICES | Facility: HOME HEALTHCARE | Age: 89
End: 2024-07-16
Payer: MEDICARE

## 2024-07-16 VITALS
SYSTOLIC BLOOD PRESSURE: 156 MMHG | OXYGEN SATURATION: 95 % | TEMPERATURE: 98.1 F | HEART RATE: 69 BPM | DIASTOLIC BLOOD PRESSURE: 76 MMHG

## 2024-07-16 VITALS
HEART RATE: 67 BPM | OXYGEN SATURATION: 97 % | DIASTOLIC BLOOD PRESSURE: 60 MMHG | TEMPERATURE: 97.8 F | SYSTOLIC BLOOD PRESSURE: 138 MMHG

## 2024-07-16 PROCEDURE — G0152 HHCP-SERV OF OT,EA 15 MIN: HCPCS

## 2024-07-16 NOTE — HOME HEALTH
"Patient states she has not been able to use the walker much due to her legs being \"weak.\"  She states her knees are still painful and the shots did not seem to help.   She understands the need for assistance in the home and indicated that her  may not be much help.  She voiced that she was not sure where to get help and agreed to MSW referral.      Plan for next visit  Continue to work on safest mobility for her   Progress HEP as tolerated."

## 2024-07-18 ENCOUNTER — HOME CARE VISIT (OUTPATIENT)
Dept: HOME HEALTH SERVICES | Facility: HOME HEALTHCARE | Age: 89
End: 2024-07-18
Payer: MEDICARE

## 2024-07-18 PROCEDURE — G0155 HHCP-SVS OF CSW,EA 15 MIN: HCPCS

## 2024-07-18 PROCEDURE — G0152 HHCP-SERV OF OT,EA 15 MIN: HCPCS

## 2024-07-19 ENCOUNTER — HOME CARE VISIT (OUTPATIENT)
Dept: HOME HEALTH SERVICES | Facility: HOME HEALTHCARE | Age: 89
End: 2024-07-19
Payer: MEDICARE

## 2024-07-19 VITALS
OXYGEN SATURATION: 98 % | SYSTOLIC BLOOD PRESSURE: 110 MMHG | DIASTOLIC BLOOD PRESSURE: 58 MMHG | TEMPERATURE: 97.5 F | HEART RATE: 62 BPM

## 2024-07-19 VITALS
DIASTOLIC BLOOD PRESSURE: 70 MMHG | SYSTOLIC BLOOD PRESSURE: 150 MMHG | TEMPERATURE: 97.5 F | HEART RATE: 66 BPM | RESPIRATION RATE: 18 BRPM | OXYGEN SATURATION: 96 %

## 2024-07-19 PROCEDURE — G0151 HHCP-SERV OF PT,EA 15 MIN: HCPCS

## 2024-07-19 NOTE — HOME HEALTH
KALEIGH crouch on this date with patient and son, Ignacio.  Patient lives with spouse however he is unable to assist with much of care needed. Discussed options for additional caregiving in home agency vs private individual. Also discussed options for senior living. Patient is open to relocation however spouse would like to stay in home and does not enjoy socialization the same as patient. Family is currently assisting with transportation to appts, groceries and meals. Family also considering ramp that would make leaving home easier. Family agreeable to contact MSW with additional questions or concerns.

## 2024-07-22 ENCOUNTER — HOME CARE VISIT (OUTPATIENT)
Dept: HOME HEALTH SERVICES | Facility: HOME HEALTHCARE | Age: 89
End: 2024-07-22
Payer: MEDICARE

## 2024-07-22 ENCOUNTER — TELEPHONE (OUTPATIENT)
Dept: ORTHOPEDIC SURGERY | Facility: CLINIC | Age: 89
End: 2024-07-22
Payer: MEDICARE

## 2024-07-22 VITALS
RESPIRATION RATE: 18 BRPM | TEMPERATURE: 97.8 F | DIASTOLIC BLOOD PRESSURE: 52 MMHG | HEART RATE: 67 BPM | SYSTOLIC BLOOD PRESSURE: 104 MMHG | OXYGEN SATURATION: 97 %

## 2024-07-22 PROCEDURE — G0152 HHCP-SERV OF OT,EA 15 MIN: HCPCS

## 2024-07-22 NOTE — TELEPHONE ENCOUNTER
Hub staff attempted to follow warm transfer process and was unsuccessful     Caller: DAT AMARAL    Relationship to patient: SON     BEST CALL BACK NUMBER [phone]     Patient is needing: PATIENT SON STATES DR MARIE SHE GET A ROLLATER. HE WENT TO [facility] BUT THEY NEED A RX FROM THE DR IN ORDER FOR INSURANCE TO PAY FOR IT.         SON ALSO STATED HIS MOM'S OT THERAPIST SUGGESTED  THEY TALK TO A PAIN SPECIALIST. SON IS LOOKING FOR SOMEONE SHE CAN SEE. WHO DO YOU RECOMMEND?

## 2024-07-23 ENCOUNTER — HOME CARE VISIT (OUTPATIENT)
Dept: HOME HEALTH SERVICES | Facility: HOME HEALTHCARE | Age: 89
End: 2024-07-23
Payer: MEDICARE

## 2024-07-23 PROCEDURE — G0151 HHCP-SERV OF PT,EA 15 MIN: HCPCS

## 2024-07-24 VITALS
HEART RATE: 60 BPM | TEMPERATURE: 98.2 F | OXYGEN SATURATION: 98 % | SYSTOLIC BLOOD PRESSURE: 136 MMHG | DIASTOLIC BLOOD PRESSURE: 76 MMHG

## 2024-07-24 NOTE — HOME HEALTH
"Patient up in her wheelchair when I arrived.  She still uses it for her home navigation.  She states she is \"not getting any better\" as far as her knees are concerned.  She states her pain at rest is minimal (0/10 today) and it was only up to 4/10 with our session today.  Her memory issues/cognitive deficits will continue to be a barrier for teaching and progressive mobility training.      Plan for next visit  Continue gait training to see if walker use is safe in the home.   Transfer training for safety.   Progress HEP as tolerated"

## 2024-07-24 NOTE — TELEPHONE ENCOUNTER
Order faxed to [facility] and confirmation received.  I spoke with pt's son and informed and I also told him thet RACH recommends discussing pain management with her PCP and he voiced understanding.

## 2024-07-25 ENCOUNTER — HOME CARE VISIT (OUTPATIENT)
Dept: HOME HEALTH SERVICES | Facility: HOME HEALTHCARE | Age: 89
End: 2024-07-25
Payer: MEDICARE

## 2024-07-25 PROCEDURE — G0151 HHCP-SERV OF PT,EA 15 MIN: HCPCS

## 2024-07-27 VITALS — DIASTOLIC BLOOD PRESSURE: 80 MMHG | HEART RATE: 73 BPM | SYSTOLIC BLOOD PRESSURE: 140 MMHG | OXYGEN SATURATION: 97 %

## 2024-07-27 NOTE — HOME HEALTH
Son present for session.  Patient sitting up in her wheelchair.  Son shown the correct sequence for her using the walker and the correct way to transfer.  He will be following up with her for safety.     Plan for next visit  Continue home safety with proper use of the walker and with transfers.

## 2024-07-30 ENCOUNTER — HOME CARE VISIT (OUTPATIENT)
Dept: HOME HEALTH SERVICES | Facility: HOME HEALTHCARE | Age: 89
End: 2024-07-30
Payer: MEDICARE

## 2024-07-30 VITALS — DIASTOLIC BLOOD PRESSURE: 64 MMHG | HEART RATE: 72 BPM | OXYGEN SATURATION: 96 % | SYSTOLIC BLOOD PRESSURE: 122 MMHG

## 2024-07-30 PROCEDURE — G0152 HHCP-SERV OF OT,EA 15 MIN: HCPCS

## 2024-07-30 PROCEDURE — G0151 HHCP-SERV OF PT,EA 15 MIN: HCPCS

## 2024-07-31 VITALS
TEMPERATURE: 98.2 F | HEART RATE: 70 BPM | SYSTOLIC BLOOD PRESSURE: 122 MMHG | DIASTOLIC BLOOD PRESSURE: 64 MMHG | OXYGEN SATURATION: 96 %

## 2024-07-31 NOTE — HOME HEALTH
Patient again complained of right knee pain.  She was able to get out of the house with her son's assist and use of the ramp, but it remains difficult.  She has done little work on the walker.     Plan for next visit  Continue walker use and assess safety on her own.   Continue HEP for right knee ROM and strengthening.   Continue home safety/transfers

## 2024-08-01 ENCOUNTER — HOME CARE VISIT (OUTPATIENT)
Dept: HOME HEALTH SERVICES | Facility: HOME HEALTHCARE | Age: 89
End: 2024-08-01
Payer: MEDICARE

## 2024-08-01 PROCEDURE — G0151 HHCP-SERV OF PT,EA 15 MIN: HCPCS

## 2024-08-05 ENCOUNTER — HOME CARE VISIT (OUTPATIENT)
Dept: HOME HEALTH SERVICES | Facility: HOME HEALTHCARE | Age: 89
End: 2024-08-05
Payer: MEDICARE

## 2024-08-05 VITALS
TEMPERATURE: 97.8 F | SYSTOLIC BLOOD PRESSURE: 112 MMHG | RESPIRATION RATE: 17 BRPM | OXYGEN SATURATION: 96 % | HEART RATE: 68 BPM | DIASTOLIC BLOOD PRESSURE: 60 MMHG

## 2024-08-05 VITALS
SYSTOLIC BLOOD PRESSURE: 114 MMHG | HEART RATE: 58 BPM | DIASTOLIC BLOOD PRESSURE: 62 MMHG | TEMPERATURE: 98.5 F | OXYGEN SATURATION: 95 %

## 2024-08-05 PROCEDURE — G0152 HHCP-SERV OF OT,EA 15 MIN: HCPCS

## 2024-08-05 NOTE — HOME HEALTH
Her son took her out the other day. She states she is not much better, although her left knee has little to no pain and the right knee hurts mainly with standing and trying to get it straight.  Still using the wheelchair. She has outpt set up for Aug 20th.      Plan for next visit  Continue to reinforce proper gait and transfer techniques  Pain management teaching as needed.  Assess for continued home therapy until her outpt PT begins.

## 2024-08-06 ENCOUNTER — HOME CARE VISIT (OUTPATIENT)
Dept: HOME HEALTH SERVICES | Facility: HOME HEALTHCARE | Age: 89
End: 2024-08-06
Payer: MEDICARE

## 2024-08-06 PROCEDURE — G0151 HHCP-SERV OF PT,EA 15 MIN: HCPCS

## 2024-08-06 NOTE — Clinical Note
Home PT will extend services for 2w1 effective 8/12 for continued gait and transfer training.  Here is my 30 day reassessment.  Thank you again for this referral.    30 day reassessment:  Patient initially sent to Home PT due to her B knee pains and unable to walk.  Her B knee pain became more specifically (mostly) right knee pain.  She has been able to use a standard rolling walker, but still under supervision as she does not have consistent caregivers in her home that can supervise her walking with the walker (which is what she needs until she can be up consistently enough with it to be deemed independent with it).  Her transfers are safer now, but still needs teaching to do them correctly as she still twists and 'flops' onto her sitting surface at times.  Her right knee ROM has improved as her extension upon eval was at 45 degrees lag and now she is able to lie supine and get it to 10 to 5 degrees lag.  Pain is also better as it was 9/10 initially and most of the time now, her resting pain is mild and pain still with weigth bearing at 5 to 6/10.  She has not done a good job following pain management teaching on use of her Tylenol and topical analgesics to help mitigate her pain even better.  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  Patient also has voiced depression (she lost a son last year) that also has been a barrier and patient admits to poor follow-through with her prescribed exercises/pain management.  Since home safety is still a concern, I will extend home PT services through the week of 8/12 to continue to teach transfers, gait and pain management.  She is set up for outpt PT on 8/20.

## 2024-08-07 ENCOUNTER — HOME CARE VISIT (OUTPATIENT)
Dept: HOME HEALTH SERVICES | Facility: HOME HEALTHCARE | Age: 89
End: 2024-08-07
Payer: MEDICARE

## 2024-08-07 VITALS
OXYGEN SATURATION: 95 % | DIASTOLIC BLOOD PRESSURE: 70 MMHG | TEMPERATURE: 98.4 F | SYSTOLIC BLOOD PRESSURE: 124 MMHG | HEART RATE: 65 BPM

## 2024-08-07 NOTE — HOME HEALTH
30 day reassessment:  Patient initially sent to Home PT due to her B knee pains and unable to walk.  Her B knee pain became more specifically (mostly) right knee pain.  She has been able to use a standard rolling walker, but still under supervision as she does not have consistent caregivers in her home that can supervise her walking with the walker (which is what she needs until she can be up consistently enough with it to be deemed independent with it).  Her transfers are safer now, but still needs teaching to do them correctly as she still twists and 'flops' onto her sitting surface at times.  Her right knee ROM has improved as her extension upon eval was at 45 degrees lag and now she is able to lie supine and get it to 10 to 5 degrees lag.  Pain is also better as it was 9/10 initially and most of the time now, her resting pain is mild and pain still with weigth bearing at 5 to 6/10.  She has not done a good job following pain management teaching on use of her Tylenol and topical analgesics to help mitigate her pain even better.  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  Patient also has voiced depression (she lost a son last year) that also has been a barrier and patient admits to poor follow-through with her prescribed exercises/pain management.  She was instructed to call her PCP to talk about the depression and any interventions suggested.  Since home safety is still a concern, I will extend home PT services through the week of 8/12 to continue to teach transfers, gait and pain management.  She is set up for outpt PT on 8/20.      Plan for next visit  Teaching for safer transfers and gait as before.   HEP progression as tolerated.   Pain management teaching follow up.

## 2024-08-08 ENCOUNTER — HOME CARE VISIT (OUTPATIENT)
Dept: HOME HEALTH SERVICES | Facility: HOME HEALTHCARE | Age: 89
End: 2024-08-08
Payer: MEDICARE

## 2024-08-08 PROCEDURE — G0152 HHCP-SERV OF OT,EA 15 MIN: HCPCS

## 2024-08-09 VITALS
SYSTOLIC BLOOD PRESSURE: 122 MMHG | OXYGEN SATURATION: 96 % | HEART RATE: 63 BPM | DIASTOLIC BLOOD PRESSURE: 62 MMHG | TEMPERATURE: 97.8 F

## 2024-08-09 NOTE — HOME HEALTH
"SUBJECTIVE: Pt reports she is continuing w/PT through next week; therefore, today is not the agency d/c, but an OT discipline d/c instead. She v/u. Pt son present for visit and asks for a review of all recommendations and he voices his concerns about pt and spouse being home alone w/o additional help in the home. Reviewed info given to them from [facility] and he stated he remembers the info, but has not acted on it yet. I recommend non-skilled agency help in the home a couple hours/day, either daily or at least 3x/wk. Son desires daily and pt is in agreement to help, but not \"all day long.\" Son will work on getting it scheduled."

## 2024-08-13 ENCOUNTER — HOME CARE VISIT (OUTPATIENT)
Dept: HOME HEALTH SERVICES | Facility: HOME HEALTHCARE | Age: 89
End: 2024-08-13
Payer: MEDICARE

## 2024-08-13 PROCEDURE — G0151 HHCP-SERV OF PT,EA 15 MIN: HCPCS

## 2024-08-15 ENCOUNTER — HOME CARE VISIT (OUTPATIENT)
Dept: HOME HEALTH SERVICES | Facility: HOME HEALTHCARE | Age: 89
End: 2024-08-15
Payer: MEDICARE

## 2024-08-15 VITALS
OXYGEN SATURATION: 98 % | DIASTOLIC BLOOD PRESSURE: 66 MMHG | SYSTOLIC BLOOD PRESSURE: 136 MMHG | TEMPERATURE: 98 F | HEART RATE: 63 BPM

## 2024-08-15 PROCEDURE — G0151 HHCP-SERV OF PT,EA 15 MIN: HCPCS

## 2024-08-15 NOTE — Clinical Note
Home health discharge on 8/15/24.  She has outpt set up for next week.  Her son is now in town and working on getting her some help at home as the patient has not been able to attend to the exercises and her gait training on her own.  Her left knee pain has been minimal.  Right knee pain waxes and wanes, mostly no pain at rest in the day but up to high number at times.  Thank you for this referral.

## 2024-08-15 NOTE — HOME HEALTH
Son present for session today to review all recommendations and to get a sense for her home safety.  He agreed with all recommendations and stated he will follow-up with them.  Patient still not being consistent with her program.      Plan for next visit  Finalize HEP/teaching and do dc assessment 17-May-2023 12:00

## 2024-08-16 VITALS
TEMPERATURE: 97.3 F | SYSTOLIC BLOOD PRESSURE: 110 MMHG | HEART RATE: 62 BPM | DIASTOLIC BLOOD PRESSURE: 70 MMHG | OXYGEN SATURATION: 96 %

## 2024-08-16 NOTE — HOME HEALTH
Patient's son present again today for final teaching/discharge instructions.  They are going to have a friend come in 5 days a week to help her follow up better with her exercises and walking.  Son did order a bed rail and should be delivered today.  Outpt still set up for next week.

## 2024-09-05 ENCOUNTER — TREATMENT (OUTPATIENT)
Dept: PHYSICAL THERAPY | Facility: CLINIC | Age: 89
End: 2024-09-05
Payer: MEDICARE

## 2024-09-05 DIAGNOSIS — M25.662 KNEE JOINT STIFFNESS, BILATERAL: ICD-10-CM

## 2024-09-05 DIAGNOSIS — Z74.09 IMPAIRED MOBILITY AND ACTIVITIES OF DAILY LIVING: ICD-10-CM

## 2024-09-05 DIAGNOSIS — M25.561 ACUTE PAIN OF BOTH KNEES: Primary | ICD-10-CM

## 2024-09-05 DIAGNOSIS — Z78.9 IMPAIRED MOBILITY AND ACTIVITIES OF DAILY LIVING: ICD-10-CM

## 2024-09-05 DIAGNOSIS — R52 PAIN AGGRAVATED BY WALKING: ICD-10-CM

## 2024-09-05 DIAGNOSIS — M25.562 ACUTE PAIN OF BOTH KNEES: Primary | ICD-10-CM

## 2024-09-05 DIAGNOSIS — M17.0 PRIMARY OSTEOARTHRITIS OF BOTH KNEES: ICD-10-CM

## 2024-09-05 DIAGNOSIS — M25.661 KNEE JOINT STIFFNESS, BILATERAL: ICD-10-CM

## 2024-09-05 NOTE — PROGRESS NOTES
Physical Therapy Initial Evaluation and Plan of Care    [facility] [place] [place] [address]  [phone] (phone)  [phone] (fax)      Patient: Stephanie Hawkins   : 1932  Diagnosis/ICD-10 Code:  Acute pain of both knees [M25.561, M25.562]  Referring practitioner: PARAMJIT Ruff  Date of Initial Visit: 2024  Today's Date: 2024  Patient seen for 1 sessions    Visit Diagnoses:    ICD-10-CM ICD-9-CM   1. Acute pain of both knees  M25.561 338.19    M25.562 719.46   2. Knee joint stiffness, bilateral  M25.661 719.56    M25.662    3. Pain aggravated by walking  R52 780.96   4. Impaired mobility and activities of daily living  Z74.09 V49.89    Z78.9    5. Primary osteoarthritis of both knees  M17.0 715.16              Subjective Evaluation    History of Present Illness  Onset date: 2024.  Mechanism of injury: Patient developed right more than left knee pain with the inability to walk over the past 3 months.  She had steroid injections in both knees July 10.  There was only short-term relief. She had injections at the arthritis pain center since then. So far they have not helped. She has end stage arthritis in both knees.   She had home health PT and OT. She worked on exercises at home but the exercises are painful and hard.  She was been doing a little walking with the walker at home. Walking as painful.  The right knee is worse.    She has been scooting around the house in the transport wheelchair. The bathroom is small and she has an extra bar and an elevated seat on the commode.  The couch has been elevated with furniture risers.   She has not fallen.  She has been very careful.   She lives with her  who has macular degeneration and does not drive as well.    Her son is here with her today.      Pain  Current pain ratin  At best pain ratin  At worst pain ratin  Location: Right more than left knee  Aggravating factors:  ambulation  Progression: worsening    Social Support  Patient lives at: One floor plan, added wheelchair ramp.  Lives with: spouse    Treatments  Previous treatment: injection treatment  Discharged from (in last 30 days): home health care  Patient Goals  Patient goals for therapy: decreased pain, increased motion, increased strength and independence with ADLs/IADLs             Objective          Static Posture     Knee   Knee (Right): Flexed.     Active Range of Motion   Left Knee   Flexion: 110 degrees   Extension: 10 degrees   Extensor lag: 10 degrees     Right Knee   Flexion: 115 degrees   Extension: 25 degrees with pain  Extensor la degrees with pain    Passive Range of Motion     Right Knee   Extension: 18 degrees with pain    Strength/Myotome Testing     Left Knee   Flexion: 3+  Extension: 3+  Quadriceps contraction: fair    Right Knee   Flexion: 3+  Extension: 3-  Quadriceps contraction: poor (Knee pain)    Right Knee Flexibility Comments:   Moderate tightness in her calf and hamstrings    Ambulation   Weight-Bearing Status   Assistive device used: wheelchair    Additional Weight-Bearing Status Details  Patient walked in clinic with rolling walker 5 feet x 2 times    Observational Gait   Gait: antalgic   Decreased walking speed, stride length, right stance time, left step length and right step length.   Left foot contact pattern: foot flat  Right foot contact pattern: foot flat    Quality of Movement During Gait     Knee    Knee (Right): Positive increased flexion during stance and stiff knee.     Functional Assessment     Comments  Patient requires cues for safe transfers including locking her wheelchair, standing up and sitting back down with hands on arm rests of chair and squaring hips to seat.  She required some assistance for supine to sit and sit to supine lifting her right leg to table        See Exercise, Manual, and Modality Logs for complete treatment.   Reviewed working on straightening out  her right knee using a pillow to support the knee and trying to allow the knee to relax down on the pillow for 20 minutes.  She could try to do some pushes down on the knee or use heat for comfort.  Worked on standing trying to encourage weight shift to the right leg for more equal weight on the legs and allowing the right knee to straighten.  Encourage short walks at home with the walker.   Discussed with patient and her son goal to increase straightening of the right knee.  Discussed amenities of assisted living.      Functional Outcome Score: KOS ADL score is 30% ability that is 70% disability highest for walking and standing        Assessment & Plan       Assessment  Impairments: abnormal gait, abnormal muscle firing, abnormal muscle tone, abnormal or restricted ROM, activity intolerance, impaired physical strength, lacks appropriate home exercise program, pain with function, safety issue and weight-bearing intolerance   Functional limitations: walking, uncomfortable because of pain, moving in bed, standing and stooping   Assessment details: Stephanie Hawkins is a 89 year-old female referred to physical therapy for knee pain due to end-stage osteoarthritis with impaired functional mobility. She presents with a evolving clinical presentation.  She has comorbidities of advanced age that does not make her a good candidate for knee surgery and personal factors she has not been compliant with exercises due to knee pain and she relies on assistance for transportation to therapy that may affect her progress in the plan of care.  Signs and symptoms are consistent with physical therapy diagnosis of right more than left knee pain with significant restriction in knee extension ROM with pain affecting her ability to stand and walk.  She is spending the majority of her day in the wheelchair and is not able to get out of her house without assistance.  She will benefit from instruction in exercises that minimize pain to help  increase her ROM that she will be more willing to perform.  Prognosis: good    Goals  Plan Goals: PT Goal Recert Due Date: 12/03/24    STG Date to Achieve: 10/17/24    STG 1.  Patient will demonstrate an independent HEP for knee ROM/flexibility that she is able to perform with knee pain staying below a 5/10    STG 2  Patient will demonstrate independent walking using the rollator walker for 20 feet with safe practice including applying brakes on wheelchair and pushing up from stable seat with knee pain 5/10 or less     STG 3. Patient will increase her right knee passive extension to 15 or less so that she can stand with more symmetrical weight bearing at the counter for 5 minutes to perform an ADL    LTG Date to Achieve: 12/03/24    LTG 1. Patient will negotiate in the house using the rolling walker at least 50% of the time     LTG 2 Patient will increase her right knee passive extension to 10 or less so that she can stand with equal weight bearing without hand support necessary for 5 minutes to perform an ADL task with both hands     LTG 3: Patient will report decreased functional disability on KOS ADL score from 70 % to 50 % or less that includes decreased disability for walking and standing activities         Plan  Therapy options: will be seen for skilled therapy services  Other planned modality interventions: aquatic therspy  Planned therapy interventions: ADL retraining, balance/weight-bearing training, functional ROM exercises, home exercise program, joint mobilization, therapeutic activities, stretching, strengthening, soft tissue mobilization, neuromuscular re-education, manual therapy, gait training, transfer training, body mechanics training and postural training  Frequency: 1x week  Duration in weeks: 12  Treatment plan discussed with: patient  Plan details: Plan for patient to bring her knee brace next visit and issue written home exercises.        Timed:         Manual Therapy:    5     mins  61253;      Therapeutic Exercise:    8     mins  17688;     Neuromuscular Zenaida:    0    mins  40535;    Therapeutic Activity:     8     mins  87414;     Gait Trainin     mins  12740;     Ultrasound:     0     mins  21804;    Self Care                       0     mins   20383      Un-Timed:  Electrical Stimulation:    0     mins  43114 ( );  Dry Needling  (1-2 muscles)            0     mins 22598 (Self-pay)  Dry Needling (3-4 muscles) 0     mins 06766 (Self-pay)  Dry Needling Trial    0     mins DRYNDLTRIAL  (No Charge)  Traction     0     mins 79641  Low Eval     0     Mins  37058  Mod Eval     30     Mins  06550  High Eval                       0     Mins  04485    Timed Treatment:   23   mins   Total Treatment:     53   mins    PT SIGNATURE: ANA Singer License Number: [id number]    Electronically signed by Ericka Singh PT, 24, 11:05 AM EDT    DATE TREATMENT INITIATED: 2024    Medicare Initial Certification  Certification Period: 2024  I certify that the therapy services are furnished while this patient is under my care.  The services outlined above are required by this patient, and will be reviewed every 90 days.     PHYSICIAN: Hailee Lopez APRN   NPI: [id number]                                         DATE:     Please sign and return via fax to [phone] Thank you, [facility].

## 2024-09-12 ENCOUNTER — TREATMENT (OUTPATIENT)
Dept: PHYSICAL THERAPY | Facility: CLINIC | Age: 89
End: 2024-09-12
Payer: MEDICARE

## 2024-09-12 DIAGNOSIS — M25.662 KNEE JOINT STIFFNESS, BILATERAL: ICD-10-CM

## 2024-09-12 DIAGNOSIS — M25.561 ACUTE PAIN OF BOTH KNEES: Primary | ICD-10-CM

## 2024-09-12 DIAGNOSIS — Z74.09 IMPAIRED MOBILITY AND ACTIVITIES OF DAILY LIVING: ICD-10-CM

## 2024-09-12 DIAGNOSIS — M25.562 ACUTE PAIN OF BOTH KNEES: Primary | ICD-10-CM

## 2024-09-12 DIAGNOSIS — Z78.9 IMPAIRED MOBILITY AND ACTIVITIES OF DAILY LIVING: ICD-10-CM

## 2024-09-12 DIAGNOSIS — M25.661 KNEE JOINT STIFFNESS, BILATERAL: ICD-10-CM

## 2024-09-12 DIAGNOSIS — R52 PAIN AGGRAVATED BY WALKING: ICD-10-CM

## 2024-09-12 DIAGNOSIS — M17.0 PRIMARY OSTEOARTHRITIS OF BOTH KNEES: ICD-10-CM

## 2024-09-12 NOTE — PROGRESS NOTES
Physical Therapy Daily Treatment Note    [facility] [place] [address]  [phone] (phone)  [phone] (fax)    Patient: Stephanie Hawkins   : 1932  Diagnosis/ICD-10 Code:  Acute pain of both knees [M25.561, M25.562]  Referring practitioner: PARAMJIT Ruff  Today's Date: 2024  Patient seen for 2 sessions    Visit Diagnoses:    ICD-10-CM ICD-9-CM   1. Acute pain of both knees  M25.561 338.19    M25.562 719.46   2. Knee joint stiffness, bilateral  M25.661 719.56    M25.662    3. Pain aggravated by walking  R52 780.96   4. Impaired mobility and activities of daily living  Z74.09 V49.89    Z78.9    5. Primary osteoarthritis of both knees  M17.0 715.16              Subjective Stephanie reports that she has been doing exercises at home including leg raises     Objective          Active Range of Motion     Right Knee   Extension: 17 degrees   Extensor la degrees       See Exercise, Manual, and Modality Logs for complete treatment.   Access Code: [email or web address]  URL: [email or web address]  Date: 2024  Prepared by: Ericka Singh    Exercises  - Active Straight Leg Raise with Quad Set  - 1 x daily - 7 x weekly - 1 sets - 10 reps  - Supine Knee Extension Stretch on Towel Roll  - 1 x daily - 7 x weekly - 1 sets - 3 reps  - Supine Quad Set  - 1 x daily - 7 x weekly - 1 sets - 10 reps  - Long Sitting Calf Stretch with Strap  - 1 x daily - 7 x weekly - 1 sets - 6 reps - 10 sec hold  - Seated Leg Press with Resistance  - 1 x daily - 7 x weekly - 1 sets - 10 reps  - Heel Toe Raises with Counter Support  - 1 x daily - 7 x weekly - 1 sets - 10 reps  - Seated Long Arc Quad  - 1 x daily - 7 x weekly - 1 sets - 10 reps - 5 sec hold  -Ankle pumps     Recommend she practice walking short distances at home using walker   Agree she may come to gym to use [employer] and made suggestions for seated exercise classes,    Assessment/Plan    Stephanie ronny  improved tolerance to right knee extension stretching exercises today and better pain tolerance to walking a few steps with walker although it makes her tired.  The knee injections may be helping.       Timed:    Manual Therapy:    12     mins  33334;  Therapeutic Exercise:    25     mins  37126;     Neuromuscular Zenaida:    10    mins  13638;    Therapeutic Activity:     7     mins  14549;     Gait Trainin     mins  88160;     Ultrasound:     0     mins  72824;    Aquatic Therapy    0     mins 38184;  Self Care                       0     mins   05605        Untimed:  Electrical Stimulation:    0     mins  30886 ( );  Traction:    0     mins  70787;   Dry Needling  (1-2 muscles)            0     mins 43852 (Self-pay)  Dry Needling (3-4 muscles) 0      (Self-pay)  Dry Needling Trial    0     DRYNDLTRIAL  (No Charge)    Timed Treatment:   54   mins   Total Treatment:     54   mins    Ericka Singh, PT  Physical Therapist    [place] License:[id number]

## 2024-10-01 ENCOUNTER — OFFICE VISIT (OUTPATIENT)
Dept: ORTHOPEDIC SURGERY | Facility: CLINIC | Age: 89
End: 2024-10-01
Payer: MEDICARE

## 2024-10-01 VITALS — TEMPERATURE: 97.5 F | HEIGHT: 63 IN | BODY MASS INDEX: 26.58 KG/M2 | WEIGHT: 150 LBS

## 2024-10-01 DIAGNOSIS — M17.0 PRIMARY OSTEOARTHRITIS OF BOTH KNEES: Primary | ICD-10-CM

## 2024-10-01 RX ORDER — METHYLPREDNISOLONE ACETATE 80 MG/ML
1 INJECTION, SUSPENSION INTRA-ARTICULAR; INTRALESIONAL; INTRAMUSCULAR; SOFT TISSUE
Status: COMPLETED | OUTPATIENT
Start: 2024-10-01 | End: 2024-10-01

## 2024-10-01 RX ORDER — LIDOCAINE HYDROCHLORIDE 10 MG/ML
2 INJECTION, SOLUTION EPIDURAL; INFILTRATION; INTRACAUDAL; PERINEURAL
Status: COMPLETED | OUTPATIENT
Start: 2024-10-01 | End: 2024-10-01

## 2024-10-01 RX ADMIN — LIDOCAINE HYDROCHLORIDE 2 ML: 10 INJECTION, SOLUTION EPIDURAL; INFILTRATION; INTRACAUDAL; PERINEURAL at 10:40

## 2024-10-01 RX ADMIN — METHYLPREDNISOLONE ACETATE 1 ML: 80 INJECTION, SUSPENSION INTRA-ARTICULAR; INTRALESIONAL; INTRAMUSCULAR; SOFT TISSUE at 10:40

## 2024-10-01 NOTE — PROGRESS NOTES
Patient: Stephanie Hawkins  YOB: 1932  Date of Service: 10/1/2024    Chief Complaints:   Chief Complaint   Patient presents with    Right Knee - Follow-up    Left Knee - Follow-up       Subjective:Pt here for pain in right knee and desires conservative treatment. She is in a wheelchair and wants a walker with wheels.    History of Present Illness: Pt is seen in the office today with complaints of   Chief Complaint   Patient presents with    Right Knee - Follow-up    Left Knee - Follow-up   .  KNEE: TIMING:  The pain is described as ACUTE on CHRONIC.  LOCATION: medial joint line tenderness. AGGRAVATING FACTORS:  Is worsened by prolonged standing, sitting, walking and squatting activities.  ASSOCIATED SYMPTOMS:  swelling, tenderness, and aching.    This problem is not new to this examiner.     Allergies:   Allergies   Allergen Reactions    Neomycin-Bacitracin Zn-Polymyx Swelling     Neosporin    Sulfa Antibiotics Hives    Penicillins Unknown - Low Severity       Medications:   Home Medications:  Current Outpatient Medications on File Prior to Visit   Medication Sig    amLODIPine-benazepril (LOTREL) 5-40 MG per capsule TAKE 1 CAPSULE DAILY    Aspirin 81 MG capsule Take 81 mg by mouth Daily.    atorvastatin (LIPITOR) 40 MG tablet TAKE 1 TABLET DAILY FOR HIGH CHOLESTEROL    GARLIC PO Take  by mouth.    hydroCHLOROthiazide 25 MG tablet TAKE 1 TABLET DAILY FOR HIGH BLOOD PRESSURE    levothyroxine (Synthroid) 50 MCG tablet Take 1 p.o. every morning for low thyroid.    metoprolol succinate XL (TOPROL-XL) 25 MG 24 hr tablet TAKE 1 TABLET EVERY MORNING FOR HIGH BLOOD PRESSURE, HEART AND PALPITATIONS    Multiple Vitamins-Minerals (PRESERVISION AREDS PO) Take  by mouth Every Night.    multivitamin (MULTI VITAMIN DAILY PO) Take 1 p.o. daily    timolol (TIMOPTIC) 0.5 % ophthalmic solution Apply as directed to the affected eye for glaucoma    vitamin D3 125 MCG (5000 UT) capsule capsule 1 by mouth daily as directed     predniSONE (DELTASONE) 10 MG tablet 5 by mouth daily 7 days, then 4 daily 2 days, 3 daily 2 days, 2 daily 2 days, 1 daily 2 days, one half daily 2 days and discontinue. (Patient not taking: Reported on 7/10/2024)     No current facility-administered medications on file prior to visit.     Current Medications:  Scheduled Meds:  Continuous Infusions:No current facility-administered medications for this visit.    PRN Meds:.    I have reviewed the patient's medical history in detail and updated the computerized patient record.  Review and summarization of old records include:    Past Medical History:   Diagnosis Date    Benign essential hypertension 02/26/2001 02/26/2001--patient was seen as a new patient. She was ordered being treated for hypertension at that time.    Carotid artery plaque, 6/13/2019--mild bilateral.  05/30/2017--16-49% left.  Mild plaque right.  05/30/2014--mild bilateral carotid plaque. 01/08/2002 June 13, 2019--carotid Doppler study reveals mild bilateral carotid plaque.  No percentage numbers given.  05/30/2017--carotid Doppler study reveals right plaque without stenosis.  There is 16-49% stenosis of the left ICA.  05/30/2014--vascular screen reveals mild bilateral carotid plaque, negative for AAA, negative for PAD.   06/19/2012--vascular screen revealed bilateral, less than 50% internal     Chronic left hip pain 06/25/2020 June 25, 2020--patient reports that she has had intermittent left hip pain for several years now.  It may or may not be getting worse and we discussed orthopedic referral which we will hold off at the present time.  Instead we will obtain an x-ray and depending upon the result and her clinical course will determine orthopedic referral.    Chronic left hip pain 06/25/2020 June 25, 2020--patient reports that she has had intermittent left hip pain for several years now.  It may or may not be getting worse and we discussed orthopedic referral which we will hold off at  the present time.  Instead we will obtain an x-ray and depending upon the result and her clinical course will determine orthopedic referral.      Diverticulosis of colon 10/16/2001    11/03/2006--the entire examined colon was normal except for scattered diverticuli.   10/16/2001--colonoscopy revealed a few left-sided scattered diverticuli.    Glaucoma, bilateral 06/01/2016 06/01/2016--routine follow-up.  She reports she was diagnosed with glaucoma about a month or so ago treated with Travatan eyedrops.    Heart palpitations 10/13/2008    Patient has at a long history of heart palpitations.   10/13/2008--24-hour Holter monitor reveals an average heart rate of 69, a minimum heart rate was 55. Maximum heart rate was 98. There were no pauses. Ventricular ectopy was zero. Supraventricular ectopy was 5585, with 15 supraventricular runs. Supraventricular bigeminy events were 6 and supraventricular trigeminy events were one. There was no atrial fibrillation/flutter. No ST segment changes. Patient's heart palpitations related to frequent PACs which have been controlled with atenolol.    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      Hyperlipidemia 02/26/2001 02/26/2001--patient was seen as a new patient. She was already on cholesterol medication at that time.    Impaired fasting glucose 06/14/2018 06/14/2018--routine follow-up.  Fasting serum glucose mildly elevated at 102.  Recommend low carbohydrate diet and weight loss.  Exercise.    Irritable bladder 08/29/2017 08/29/2017--patient seen in follow-up after recent urinary tract infection with complaints of nocturia ×4-5 per night as well as frequency and urgency.  Repeat urinalysis is negative.  Urine culture sent.  Given the fact the patient is had the urinary symptoms prior to the onset of the  "recent UTI, I will treat her empirically for irritable bladder.  Myrbetriq 25 mg per day.  May increase to 50 mg per day if necessary.  I will have her follow-up in about 3 weeks to reassess his situation.    Irritable bowel syndrome 04/27/2016    Menopausal state 05/17/2017    Osteopenia, 06/20/2018--lumbar spine 0.4.  Right femoral neck -1.1.  Left femoral neck -0.8.  05/30/2014--lumbar spine 0.6.  Right femoral neck -1.2.  Left femoral neck -1.7. 06/09/2006 06/20/2018--DEXA scan reveals average lumbar T score 0.4.  Right femoral neck T score -1.1.  Left femoral neck T score -0.8.  Assessment is osteopenia and compared to previous study in 2016 there is been no change in the right femoral neck and 17.2% improvement in the left femoral neck and a 2.1% decrease in the lumbar spine.  05/30/2014--DEXA scan reveals average lumbar spine T score of 0.6. Right femoral neck T score -1.2. Left femoral neck T score -1.7. Osteopenia. Compared to the previous examination of 11/09/2010, there is a 1% decrease in the left hip bone mineral density, 2.1% decrease in the right hip bone density, and a 1.6% improvement in the lumbar spine bone density.   11/22/2010--treatment for osteopenia begun with Fosamax but patient discontinued it on her own.   11/09/2010--DEXA scan revealed average lumbar spine T score 0.5. Left femoral neck T score -1.4. Right femoral neck T score -1.1. Osteopenia.   06/09/2006--DEXA scan reveals lumbar spine T score 0.6. Left hip T score -0.8, left     Pancreatic lesion, 4 mm, repeat study due June 2019 06/27/2018 11/27/2018--patient seen in follow-up and reports she is having urinary frequency without dysuria, but no incontinence and occasionally has some urgency.  She has no pain at the present time.  Apparently she saw a gynecologist and he did not examine her because \"we do not do Pap smears on a woman year age\".  06/27/2018--patient seen in follow-up and the results of the CT scan discussed.  She " "lu    Possible Pelvic congestion syndrome 06/27/2018 11/27/2018--patient seen in follow-up and reports she is having urinary frequency without dysuria, but no incontinence and occasionally has some urgency.  She has no pain at the present time.  Apparently she saw a gynecologist and he did not examine her because \"we do not do Pap smears on a woman year age\".  06/27/2018--patient seen in follow-up and the results of the CT scan discussed.  She curre    Primary hypothyroidism 12/05/2017    May 28, 2019--routine follow-up.  TSH slightly elevated at 4.56.  Free T3 and free T4 are normal.  Continued monitoring.  12/05/2017--routine follow-up.  TSH elevated at 5.19.  Free T3 and free T4 are normal.  Repeat thyroid function tests ordered.  Thyroid antibodies ordered.  If these confirm diagnosis of hypothyroidism and we will proceed with thyroid ultrasound.    Primary osteoarthritis of left hip 02/21/2024 June 25, 2020--patient reports that she has had intermittent left hip pain for several years now.  It may or may not be getting worse and we discussed orthopedic referral which we will hold off at the present time.  Instead we will obtain an x-ray and depending upon the result and her clinical course will determine orthopedic referral.      Primary osteoarthritis of left knee 03/23/2004 03/23/2004--patient was evaluated by the orthopedist for left knee pain. X-rays revealed osteoarthritis with some medial joint space narrowing, subchondral sclerosis, and patellofemoral spurs. Cortisone injections were given.    Situational anxiety 11/08/2023    Subclinical hypothyroidism 12/05/2017    May 28, 2019--routine follow-up.  TSH slightly elevated at 4.56.  Free T3 and free T4 are normal.  Continued monitoring.  12/05/2017--routine follow-up.  TSH elevated at 5.19.  Free T3 and free T4 are normal.  Repeat thyroid function tests ordered.  Thyroid antibodies ordered.  If these confirm diagnosis of hypothyroidism and we " will proceed with thyroid ultrasound.    Vitamin D deficiency 2016        Past Surgical History:   Procedure Laterality Date    CATARACT EXTRACTION Right 2014--cataract extirpation and intraocular lens implantation right eye.     CATARACT EXTRACTION Left 2010--cataract extirpation and intraocular lens implantation in the left eye.    COLONOSCOPY  2006--the entire examined colon was normal except for scattered diverticuli.     COLONOSCOPY  10/16/2001    10/16/2001--colonoscopy revealed a few left-sided scattered diverticuli.    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        Social History     Occupational History    Occupation: Retired () [employer]    Tobacco Use    Smoking status: Never    Smokeless tobacco: Never   Vaping Use    Vaping status: Never Used   Substance and Sexual Activity    Alcohol use: No    Drug use: No    Sexual activity: Not Currently     Partners: Male      Social History     Social History Narrative    Not on file        Family History   Problem Relation Age of Onset    Cirrhosis Mother         Of Liver. Mother  at age 62 from alcoholic cirrhosis.    Heart attack Father         Acute Myocardial Infarction.  Father  of a myocardial infarction at age 68.       ROS: 14 point review of systems was performed and was negative except for documented findings in HPI and today's encounter.     Allergies:   Allergies   Allergen Reactions    Neomycin-Bacitracin Zn-Polymyx Swelling     Neosporin    Sulfa Antibiotics Hives    Penicillins Unknown - Low Severity     Constitutional:  Denies fever, shaking or chills   Eyes:  Denies change in visual acuity   HENT:  Denies nasal congestion or sore throat   Respiratory:  Denies cough or shortness of breath   Cardiovascular:  Denies  chest pain or severe LE edema   GI:  Denies abdominal pain, nausea, vomiting, bloody stools or diarrhea   Musculoskeletal:  Numbness, tingling, or loss of motor function only as noted above in history of present illness.  : Denies painful urination or hematuria  Integument:  Denies rash, lesion or ulceration   Neurologic:  Denies headache or focal weakness  Endocrine:  Denies lymphadenopathy  Psych:  Denies confusion or change in mental status   Hem:  Denies active bleeding      Physical Exam: 89 y.o. female  Wt Readings from Last 3 Encounters:   10/01/24 68 kg (150 lb)   07/10/24 68 kg (150 lb)   03/15/24 68.5 kg (151 lb)       Body mass index is 26.57 kg/m².  Facility age limit for growth %joe is 20 years.  Vitals:    10/01/24 1019   Temp: 97.5 °F (36.4 °C)     Vital signs reviewed.   General Appearance:    Alert, cooperative, in no acute distress                  Eyes: conjunctiva clear  ENT: external ears and nose atraumatic  CV: no peripheral edema  Resp: normal respiratory effort  Skin: no rashes or wounds; normal turgor  Psych: mood and affect appropriate  Lymph: no nodes appreciated  Neuro: gross sensation intact  Vascular:  Palpable peripheral pulse in noted extremity  Musculoskeletal Extremities: KNEE Exam: medial and lateral joint line tenderness with crepitation, synovitis, swelling, and joint effusion bilateral knee.      Procedure:  Large Joint Arthrocentesis: L knee  Date/Time: 10/1/2024 10:40 AM  Consent given by: patient  Site marked: site marked  Timeout: Immediately prior to procedure a time out was called to verify the correct patient, procedure, equipment, support staff and site/side marked as required   Supporting Documentation  Indications: pain   Procedure Details  Location: knee - L knee  Preparation: Patient was prepped and draped in the usual sterile fashion  Needle gauge: 21G.  Medications administered: 1 mL methylPREDNISolone acetate 80 MG/ML; 2 mL lidocaine PF 1% 1 %  Patient  tolerance: patient tolerated the procedure well with no immediate complications      Patient needs rolling walker with seat attachment for safe ambulation. Patient has limited mobility that significantly impairs their ability to participate in one or more mobility - related activities of daily living in the home.     Assessment:     ICD-10-CM ICD-9-CM   1. Primary osteoarthritis of both knees  M17.0 715.16       Plan:   Follow up as indicated.  Ice, elevate, and rest as needed.  The diagnosis(es), natural history, pathophysiology and treatment for diagnosis(es) were discussed. Opportunity given and questions answered.  Biomechanics of pertinent body areas discussed.  When appropriate, the use of ambulatory aids discussed.  EXERCISES:  Advice on benefits of, and types of regular/moderate exercise pertaining to orthopedic diagnosis(es).  MEDICATIONS:  The risks, benefits, warnings,side effects and alternatives of medications discussed.  Inflammation/pain control; with cold, heat, elevation and/or liniments discussed as appropriate  HOME EXERCISE/PT program encouraged  MEDICAL RECORDS reviewed from other provider(s) for past and current medical history pertinent to this complaint.    10/1/2024

## 2024-10-03 ENCOUNTER — OFFICE VISIT (OUTPATIENT)
Dept: INTERNAL MEDICINE | Facility: CLINIC | Age: 89
End: 2024-10-03
Payer: MEDICARE

## 2024-10-03 VITALS
DIASTOLIC BLOOD PRESSURE: 68 MMHG | BODY MASS INDEX: 26.22 KG/M2 | OXYGEN SATURATION: 97 % | TEMPERATURE: 98 F | RESPIRATION RATE: 16 BRPM | HEART RATE: 61 BPM | SYSTOLIC BLOOD PRESSURE: 130 MMHG | HEIGHT: 63 IN | WEIGHT: 148 LBS

## 2024-10-03 DIAGNOSIS — R73.01 IMPAIRED FASTING GLUCOSE: Chronic | ICD-10-CM

## 2024-10-03 DIAGNOSIS — M16.12 PRIMARY OSTEOARTHRITIS OF LEFT HIP: Chronic | ICD-10-CM

## 2024-10-03 DIAGNOSIS — I10 BENIGN ESSENTIAL HYPERTENSION: Chronic | ICD-10-CM

## 2024-10-03 DIAGNOSIS — Z12.31 BREAST CANCER SCREENING BY MAMMOGRAM: ICD-10-CM

## 2024-10-03 DIAGNOSIS — M85.89 OSTEOPENIA OF MULTIPLE SITES: Chronic | ICD-10-CM

## 2024-10-03 DIAGNOSIS — Z85.828 HISTORY OF SQUAMOUS CELL CARCINOMA OF SKIN: Chronic | ICD-10-CM

## 2024-10-03 DIAGNOSIS — F41.8 SITUATIONAL ANXIETY: Chronic | ICD-10-CM

## 2024-10-03 DIAGNOSIS — Z78.0 POSTMENOPAUSAL STATE: Chronic | ICD-10-CM

## 2024-10-03 DIAGNOSIS — I65.23 ATHEROSCLEROSIS OF BOTH CAROTID ARTERIES: Chronic | ICD-10-CM

## 2024-10-03 DIAGNOSIS — K57.30 DIVERTICULOSIS OF LARGE INTESTINE WITHOUT HEMORRHAGE: Chronic | ICD-10-CM

## 2024-10-03 DIAGNOSIS — Z00.00 ENCOUNTER FOR SUBSEQUENT ANNUAL WELLNESS VISIT (AWV) IN MEDICARE PATIENT: Primary | ICD-10-CM

## 2024-10-03 DIAGNOSIS — Z51.81 THERAPEUTIC DRUG MONITORING: ICD-10-CM

## 2024-10-03 DIAGNOSIS — E03.9 PRIMARY HYPOTHYROIDISM: Chronic | ICD-10-CM

## 2024-10-03 DIAGNOSIS — H40.10X0 OPEN-ANGLE GLAUCOMA OF BOTH EYES, UNSPECIFIED GLAUCOMA STAGE, UNSPECIFIED OPEN-ANGLE GLAUCOMA TYPE: Chronic | ICD-10-CM

## 2024-10-03 DIAGNOSIS — M17.12 PRIMARY OSTEOARTHRITIS OF LEFT KNEE: Chronic | ICD-10-CM

## 2024-10-03 DIAGNOSIS — M25.561 CHRONIC PAIN OF RIGHT KNEE: ICD-10-CM

## 2024-10-03 DIAGNOSIS — E78.2 MIXED HYPERLIPIDEMIA: Chronic | ICD-10-CM

## 2024-10-03 DIAGNOSIS — G89.29 CHRONIC PAIN OF RIGHT KNEE: ICD-10-CM

## 2024-10-03 DIAGNOSIS — K58.9 IRRITABLE BOWEL SYNDROME, UNSPECIFIED TYPE: Chronic | ICD-10-CM

## 2024-10-03 DIAGNOSIS — E55.9 VITAMIN D DEFICIENCY: Chronic | ICD-10-CM

## 2024-10-03 DIAGNOSIS — N32.89 IRRITABLE BLADDER: Chronic | ICD-10-CM

## 2024-10-03 DIAGNOSIS — M17.11 PRIMARY OSTEOARTHRITIS OF RIGHT KNEE: ICD-10-CM

## 2024-10-03 DIAGNOSIS — Z23 NEED FOR INFLUENZA VACCINATION: ICD-10-CM

## 2024-10-03 DIAGNOSIS — R00.2 HEART PALPITATIONS: Chronic | ICD-10-CM

## 2024-10-03 DIAGNOSIS — K86.9 PANCREATIC LESION: Chronic | ICD-10-CM

## 2024-10-03 PROBLEM — R31.0 GROSS HEMATURIA: Status: RESOLVED | Noted: 2024-02-21 | Resolved: 2024-10-03

## 2024-10-03 RX ORDER — MELOXICAM 7.5 MG/1
TABLET ORAL
Qty: 90 TABLET | Refills: 3 | Status: SHIPPED | OUTPATIENT
Start: 2024-10-03

## 2024-10-03 NOTE — PROGRESS NOTES
Subjective   The ABCs of the Annual Wellness Visit  Medicare Wellness Visit      Stephanie Hawkins is a 89 y.o. patient who presents for a Medicare Wellness Visit.    The following portions of the patient's history were reviewed and   updated as appropriate: allergies, current medications, past family history, past medical history, past social history, past surgical history, and problem list.    Compared to one year ago, the patient's physical   health is worse.  Compared to one year ago, the patient's mental   health is worse.    Recent Hospitalizations:  She was not admitted to the hospital during the last year.     Current Medical Providers:  Patient Care Team:  Jeovany Dietrich MD as PCP - General (Internal Medicine)    Outpatient Medications Prior to Visit   Medication Sig Dispense Refill    amLODIPine-benazepril (LOTREL) 5-40 MG per capsule TAKE 1 CAPSULE DAILY 90 capsule 3    Aspirin 81 MG capsule Take 81 mg by mouth Daily.      atorvastatin (LIPITOR) 40 MG tablet TAKE 1 TABLET DAILY FOR HIGH CHOLESTEROL 90 tablet 1    GARLIC PO Take  by mouth.      hydroCHLOROthiazide 25 MG tablet TAKE 1 TABLET DAILY FOR HIGH BLOOD PRESSURE 90 tablet 1    levothyroxine (Synthroid) 50 MCG tablet Take 1 p.o. every morning for low thyroid. 90 tablet 3    metoprolol succinate XL (TOPROL-XL) 25 MG 24 hr tablet TAKE 1 TABLET EVERY MORNING FOR HIGH BLOOD PRESSURE, HEART AND PALPITATIONS 90 tablet 3    Multiple Vitamins-Minerals (PRESERVISION AREDS PO) Take  by mouth Every Night.      multivitamin (MULTI VITAMIN DAILY PO) Take 1 p.o. daily      timolol (TIMOPTIC) 0.5 % ophthalmic solution Apply as directed to the affected eye for glaucoma      vitamin D3 125 MCG (5000 UT) capsule capsule 1 by mouth daily as directed      predniSONE (DELTASONE) 10 MG tablet 5 by mouth daily 7 days, then 4 daily 2 days, 3 daily 2 days, 2 daily 2 days, 1 daily 2 days, one half daily 2 days and discontinue. (Patient not taking: Reported on 7/10/2024) 56  "tablet 0     No facility-administered medications prior to visit.     No opioid medication identified on active medication list. I have reviewed chart for other potential  high risk medication/s and harmful drug interactions in the elderly.      Aspirin is on active medication list. Aspirin use is indicated based on review of current medical condition/s. Pros and cons of this therapy have been discussed today. Benefits of this medication outweigh potential harm.  Patient has been encouraged to continue taking this medication.  .      Patient Active Problem List   Diagnosis    Benign essential hypertension    Carotid artery plaque, 6/13/2019--mild bilateral.  05/30/2017--16-49% left.  Mild plaque right.  05/30/2014--mild bilateral carotid plaque.    Diverticulosis of colon    Heart palpitations    Hyperlipidemia    Irritable bowel syndrome    Primary osteoarthritis of left knee    Osteopenia of multiple sites    Vitamin D deficiency    Therapeutic drug monitoring    Glaucoma, bilateral    Irritable bladder    Primary hypothyroidism    Impaired fasting glucose    Pancreatic lesion, 6/13/2019--dilated pancreatic side duct.  No mass.  No further follow-up.  4 mm, repeat study due June 2019    Postmenopausal state    Situational anxiety    Primary osteoarthritis of left hip    History of squamous cell carcinoma of skin of the face    Primary osteoarthritis of right knee    Chronic pain of right knee     Advance Care Planning Advance Directive is not on file.  ACP discussion was held with the patient during this visit. Patient does not have an advance directive, information provided.            Objective   Vitals:    10/03/24 1052   BP: 130/68   Pulse: 61   Resp: 16   Temp: 98 °F (36.7 °C)   TempSrc: Oral   SpO2: 97%   Weight: 67.1 kg (148 lb)   Height: 160 cm (62.99\")       Estimated body mass index is 26.22 kg/m² as calculated from the following:    Height as of this encounter: 160 cm (62.99\").    Weight as of this " encounter: 67.1 kg (148 lb).            Does the patient have evidence of cognitive impairment? No                                                                                               Health  Risk Assessment    Smoking Status:  Social History     Tobacco Use   Smoking Status Never   Smokeless Tobacco Never     Alcohol Consumption:  Social History     Substance and Sexual Activity   Alcohol Use No       Fall Risk Screen  STEADI Fall Risk Assessment was completed, and patient is at LOW risk for falls.Assessment completed on:10/3/2024    Depression Screening:      10/3/2024    10:56 AM   PHQ-2/PHQ-9 Depression Screening   Little Interest or Pleasure in Doing Things 0-->not at all   Feeling Down, Depressed or Hopeless 0-->not at all   PHQ-9: Brief Depression Severity Measure Score 0     Health Habits and Functional and Cognitive Screening:      10/3/2024    10:57 AM   Functional & Cognitive Status   Do you have difficulty preparing food and eating? No   Do you have difficulty bathing yourself, getting dressed or grooming yourself? Yes   Do you have difficulty using the toilet? Yes   Do you have difficulty moving around from place to place? Yes   Do you have trouble with steps or getting out of a bed or a chair? Yes   Current Diet Limited Junk Food   Dental Exam Not up to date   Eye Exam Not up to date   Exercise (times per week) 0 times per week   Current Exercises Include No Regular Exercise   Do you need help using the phone?  No   Are you deaf or do you have serious difficulty hearing?  No   Do you need help to go to places out of walking distance? Yes   Do you need help shopping? Yes   Do you need help preparing meals?  Yes   Do you need help with housework?  Yes   Do you need help with laundry? Yes   Do you need help taking your medications? No   Do you need help managing money? No   Do you ever drive or ride in a car without wearing a seat belt? No   Have you felt unusual stress, anger or loneliness in  the last month? Yes   Who do you live with? Spouse   If you need help, do you have trouble finding someone available to you? No   Have you been bothered in the last four weeks by sexual problems? No   Do you have difficulty concentrating, remembering or making decisions? No           Age-appropriate Screening Schedule:  Refer to the list below for future screening recommendations based on patient's age, sex and/or medical conditions. Orders for these recommended tests are listed in the plan section. The patient has been provided with a written plan.    Health Maintenance List  Health Maintenance   Topic Date Due    DXA SCAN  06/20/2020    MAMMOGRAM  12/28/2020    RSV Vaccine - Adults (1 - 1-dose 60+ series) 03/15/2025 (Originally 12/16/1992)    LIPID PANEL  03/07/2025    ANNUAL WELLNESS VISIT  10/03/2025    TDAP/TD VACCINES (3 - Td or Tdap) 01/10/2030    INFLUENZA VACCINE  Completed    Pneumococcal Vaccine 65+  Completed    COVID-19 Vaccine  Discontinued    ZOSTER VACCINE  Discontinued    BMI FOLLOWUP  Discontinued                                                                                                                                                CMS Preventative Services Quick Reference  Risk Factors Identified During Encounter  Chronic Pain: Natural history and expected course discussed. Questions answered.  NSAIDs per medication orders.  Depression/Dysphoria:  Seems stable and not bad enough to initiate medication.  Fall Risk-High or Moderate: Discussed Fall Prevention in the home  Immunizations Discussed/Encouraged: Influenza and RSV (Respiratory Syncytial Virus)    The above risks/problems have been discussed with the patient.  Pertinent information has been shared with the patient in the After Visit Summary.  An After Visit Summary and PPPS were made available to the patient.    Follow Up:   Next Medicare Wellness visit to be scheduled in 1 year.     Assessment & Plan  Encounter for subsequent annual  wellness visit (AWV) in Medicare patient    Impaired fasting glucose    Hyperlipidemia     Vitamin D deficiency    Benign essential hypertension    Primary hypothyroidism    Carotid artery plaque, 6/13/2019--mild bilateral.  05/30/2017--16-49% left.  Mild plaque right.  05/30/2014--mild bilateral carotid plaque.    Diverticulosis of colon    Open-angle glaucoma of both eyes, unspecified glaucoma stage, unspecified open-angle glaucoma type    Heart palpitations    History of squamous cell carcinoma of skin of the face    Irritable bladder    Irritable bowel syndrome, unspecified type    Osteopenia of multiple sites    Postmenopausal state    Pancreatic lesion, 6/13/2019--dilated pancreatic side duct.  No mass.  No further follow-up.  4 mm, repeat study due June 2019    Primary osteoarthritis of left hip    Primary osteoarthritis of left knee    Situational anxiety    Chronic pain of right knee    Primary osteoarthritis of right knee    Breast cancer screening by mammogram    Therapeutic drug monitoring    Need for influenza vaccination      Orders Placed This Encounter   Procedures    DEXA Bone Density Axial    Mammo Screening Digital Tomosynthesis Bilateral With CAD    Fluzone High-Dose 65+yrs (9008-1173)    CBC (No Diff)    CK    Comprehensive Metabolic Panel    Hemoglobin A1c    NMR LipoProfile    TSH    T4, Free    T3, Free    Urinalysis With Microscopic If Indicated (No Culture) - Urine, Clean Catch    Vitamin D,25-Hydroxy     New Medications Ordered This Visit   Medications    meloxicam (Mobic) 7.5 MG tablet     Sig: Take 1 p.o. daily with food for arthritis     Dispense:  90 tablet     Refill:  3          Follow Up:   Return in about 6 months (around 4/3/2025) for Next scheduled follow up with lab prior.

## 2024-10-09 ENCOUNTER — TELEPHONE (OUTPATIENT)
Dept: INTERNAL MEDICINE | Facility: CLINIC | Age: 89
End: 2024-10-09
Payer: MEDICARE

## 2024-10-09 ENCOUNTER — TELEPHONE (OUTPATIENT)
Dept: INTERNAL MEDICINE | Facility: CLINIC | Age: 89
End: 2024-10-09

## 2024-10-21 DIAGNOSIS — E78.2 MIXED HYPERLIPIDEMIA: Chronic | ICD-10-CM

## 2024-10-21 DIAGNOSIS — I10 BENIGN ESSENTIAL HYPERTENSION: Chronic | ICD-10-CM

## 2024-10-21 RX ORDER — ATORVASTATIN CALCIUM 40 MG/1
TABLET, FILM COATED ORAL
Qty: 90 TABLET | Refills: 3 | Status: SHIPPED | OUTPATIENT
Start: 2024-10-21

## 2024-10-21 RX ORDER — HYDROCHLOROTHIAZIDE 25 MG/1
TABLET ORAL
Qty: 90 TABLET | Refills: 3 | Status: SHIPPED | OUTPATIENT
Start: 2024-10-21

## 2024-11-04 ENCOUNTER — TELEPHONE (OUTPATIENT)
Dept: INTERNAL MEDICINE | Facility: CLINIC | Age: 89
End: 2024-11-04
Payer: MEDICARE

## 2024-11-05 DIAGNOSIS — R49.0 CHRONIC HOARSENESS: Primary | ICD-10-CM

## 2024-11-07 NOTE — PROGRESS NOTES
12/05/2017    Patient Information  Stephanie Hawkins                                                                                          [id number]  [facility] [address]      12/16/1932        Chief Complaint:     Follow-up hyperlipidemia, hypertension, carotid artery plaque, heart palpitations, osteopenia, vitamin D deficiency, irritable bladder.  Complaining of intermittent left lower quadrant abdominal pain.    History of Present Illness:    Patient with a history of medical problems as outlined in the chief complaint presents today for a follow-up with lab prior in order to monitor her chronic medical issues.  She currently feels fairly well except except for complaints of intermittent left lower quadrant abdominal discomfort without change in bowel habits, fever, chills, or other systemic signs or symptoms.  She describes this as a crampy dull ache.  Her last colonoscopy was in 2006 and she does not want to have another colonoscopy given her age and I certainly agree with this unless there should be some significant need to do so.  Past medical history reviewed and updated where necessary including health maintenance parameters.  This reveals she is up-to-date.    Review of Systems   Constitution: Negative.   HENT: Negative.    Eyes: Negative.    Cardiovascular: Negative.    Respiratory: Negative.    Endocrine: Negative.    Hematologic/Lymphatic: Negative.    Skin: Negative.    Musculoskeletal: Negative.    Gastrointestinal: Positive for abdominal pain.   Genitourinary: Negative.    Neurological: Negative.    Psychiatric/Behavioral: Negative.    Allergic/Immunologic: Negative.        Active Problems:    Patient Active Problem List   Diagnosis   • Benign essential hypertension   • Carotid artery plaque, 05/30/2017--16-49% left.  Mild plaque right.  05/30/2014--mild bilateral carotid plaque.   • Diverticulosis of colon   • Heart palpitations   • Hyperlipidemia   • Irritable bowel syndrome   •  PT Re-Evaluation     Today's date: 2024  Patient name: Linda Fish  : 1951  MRN: [id number]  Referring provider: Edouard Lopez DPM  Dx:   Encounter Diagnosis     ICD-10-CM    1. Tendonitis, Achilles, right  M76.61         Re-Assessment: Linda Fish is a 73 y.o. female seen in outpatient physical therapy at [facility] with complaints of R achilles pain and stiffness.  She has been treated with decreased range of motion, decreased strength, limited flexibility, poor postural awareness, poor body mechanics, altered gait pattern, poor balance, decreased tolerance to activity and decreased functional mobility due to achilles tendonopathy. Re-evaluation was performed today to assess if she would benefit from skilled PT services in order to address these deficits and reach maximum level of function.  Thank you for the referral!   Understanding of Dx/Px/POC: good     Prognosis: good    Goals  STG  1. Independent with HEP in 2 weeks       Goal met  2. Decrease pain at worst by 50% in 2 weeks     No progress     LTG  1. Achieve painfree R ankle mobility in all ranges 4 weeks    Goal met   2. Return to full, pain-free and unrestricted prolonged walking in 4 weeks  Slow progress        Plan  Patient would benefit from: continued skilled physical therapy  Planned modality interventions: thermotherapy: hydrocollator packs  Planned therapy interventions: manual therapy, neuromuscular re-education, therapeutic activities and therapeutic exercise  Frequency: 1x week  Duration in weeks: 4  Treatment plan discussed with: patient    Subjective Evaluation    History of Present Illness  Mechanism of injury: Pt reports 1 month of R achilles pain with insidious onset that has gradually worsened more recently after [place]   Patient Goals  Patient goal: walking longer distances (>1 mile) without pain  Able to walk with 3/10  Pain  Current pain ratin  At best pain ratin  At worst  "pain rating: 3        3/10  Quality: dull ache  Relieving factors: ice  Aggravating factors: walking and standing  Progression: worsening        Objective     Tenderness     Right Ankle/Foot   Tenderness in the Achilles insertion.     Active Range of Motion   Left Ankle/Foot   Dorsiflexion (ke): 15 degrees   Plantar flexion: 40 degrees     Right Ankle/Foot   Dorsiflexion (ke): 10 degrees with pain   10 deg painfree  Plantar flexion: 35 degrees with pain    35 deg painfree    Strength/Myotome Testing     Right Ankle/Foot   Dorsiflexion: 4+      5  Plantar flexion: 4+      5    Tests     Right Ankle/Foot   Negative for calcaneal squeeze, Homans' sign and impingement sign.        HEP ACCESS CODE: [id number]     POC Expires Reeval for Medicare to be completed  Unit LImit Auth Expiration Date PT/OT/STVisit Limit   11/8/2024 By visit 10 na 12/31/2024 50                       Auth Status DATE 10/8 10/15 10/22 10/29 11/7     Used 1 2 3 4 5     Remaining 49 [phone] 45    MANUAL THERAPY         IATM to R achilles NS JK STM JK JK     ktape  JK defer      Laser ankle/achilles tendonitis   4/5 mins achilles defer     RE     NS    FOTO     NS             THERAPEUTIC EXERCISE HEP         Heat on achilles in DF stretch (prone) 10/8 5'  5'  5'     HR eccentrics 3x10 5\"/5\" ecc & iso (10/8) 10x5\"/5\" iso & ecc 10x5\"/5\" iso & ecc 10 x SL 20 x5\" ecc SL 20x5\" ecc SL    Gastroc & soleus stretch (slant)   10x10\" ea    10x10\" 10x10\"/1' 1' x2 ea     \" \" Against wall      30\"x3 ea                                                                                                                  NEUROMUSCULAR REEDUCATION           SLS   15\" 3x 15\" 3x 15\" 5x hep    Lat res walk   nv OTB 3 laps OTB 3 laps hep    Airex tandem    3 laps 3 laps 3 laps hep    Airex lateral walk    3 laps 3 laps 3 laps hep                                                                                                        THERAPEUTIC ACTIVITY          Patient education: " Primary osteoarthritis of left knee   • Osteopenia, 05/30/2014--lumbar spine 0.6.  Right femoral neck -1.2.  Left femoral neck -1.7.   • Vitamin D deficiency   • Therapeutic drug monitoring   • Glaucoma, bilateral   • Menopausal state   • Irritable bladder   • Macrocytosis   • Hypothyroidism         Past Medical History:   Diagnosis Date   • Benign essential hypertension 2/26/2001 02/26/2001--patient was seen as a new patient. She was ordered being treated for hypertension at that time.   • Carotid artery plaque, 05/30/2014--mild bilateral carotid plaque. 1/8/2002 05/30/2014--vascular screen reveals mild bilateral carotid plaque, negative for AAA, negative for PAD.   06/19/2012--vascular screen revealed bilateral, less than 50% internal carotid artery stenosis, negative for AAA, negative for PAD.   05/10/2010--carotid Doppler revealed 16-49% stenosis in the right and left internal carotid arteries.   01/08/2002--vascular screen revealed mild bilateral carotid artery plaque, negative for AAA, negative for PAD.   • Diverticulosis of colon 10/16/2001    11/03/2006--the entire examined colon was normal except for scattered diverticuli.   10/16/2001--colonoscopy revealed a few left-sided scattered diverticuli.   • Glaucoma, bilateral 6/1/2016 06/01/2016--routine follow-up.  She reports she was diagnosed with glaucoma about a month or so ago treated with Travatan eyedrops.   • Heart palpitations 10/13/2008    Patient has at a long history of heart palpitations.   10/13/2008--24-hour Holter monitor reveals an average heart rate of 69, a minimum heart rate was 55. Maximum heart rate was 98. There were no pauses. Ventricular ectopy was zero. Supraventricular ectopy was 5585, with 15 supraventricular runs. Supraventricular bigeminy events were 6 and supraventricular trigeminy events were one. There was no atrial fibrillation/flutter. No ST segment changes. Patient's heart palpitations related to frequent PACs which  have been controlled with atenolol.   • History of bone density study 05/30/2014 05/30/2014--DEXA scan reveals average lumbar spine T score of 0.6. Right femoral neck T score -1.2. Left femoral neck T score -1.7. Osteopenia. Compared to the previous examination of 11/09/2010, there is a 1% decrease in the left hip bone mineral density, 2.1% decrease in the right hip bone density, and a 1.6% improvement in the lumbar spine bone density.   11/22/2010--treatment for osteopenia be   • History of carotid Doppler/vascular screen 05/30/2014 05/30/2014--vascular screen reveals mild bilateral carotid plaque, negative for AAA, negative for PAD.   06/19/2012--vascular screen revealed bilateral, less than 50% internal carotid artery stenosis, negative for AAA, negative for PAD.   05/10/2010--carotid Doppler revealed 16-49% stenosis in the right and left internal carotid arteries.   01/08/2002--vascular screen revealed mild bilateral carot   • History of chest x-ray 03/04/2009 03/04/2009--chest x-ray reveals small calcified granuloma bilaterally. No active disease.   • History of depression 05/18/2009 05/18/2009--treatment for depression initiated with Lexapro. This was related to patient's son developing cancer. She subsequently discontinued the medication and has had no further problems.   • History of dislocation of shoulder 01/16/2006 01/16/2006--patient fell and dislocated her shoulder and subsequently underwent reduction.   • History of herpes zoster virus vaccination 01/11/2016 1/11/2016--zoster vaccination given.   • History of Holter monitoring 10/13/2008    10/13/2008--24-hour Holter monitor reveals an average heart rate of 69, a minimum heart rate was 55. Maximum heart rate was 98. There were no pauses. Ventricular ectopy was zero. Supraventricular ectopy was 5585, with 15 supraventricular runs. Supraventricular bigeminy events were 6 and supraventricular trigeminy events were one. There was no  pathoanatomy, nature of sxs, POC, HEP   NS    NS    Bike CUNHA    6' L1 nv 5' L1                        GAIT TRAINING                                                  MODALITIES                                        atrial fibrillation/flutter. No ST segment changes. Pat   • History of mammogram 10/07/2015    10/07/2015--negative mammogram.   09/26/2014--normal mammogram.   9/10/2013--negative mammogram.   09/07/2012--negative mammogram.   • History of pneumococcal vaccination 05/01/2015 05/01/2015--Prevnar 13 given. Patient received her initial pneumococcal vaccination after the age of 65 and therefore no further pneumococcal vaccinations required after today's Prevnar.   2010--patient reports she received a pneumococcal vaccination at the [facility].   • History of Right rotator cuff tendinitis 02/07/20112    02/07/2012--patient evaluated by the orthopedist and given a Depo-Medrol injection in the right shoulder for rotator cuff tendinitis.   • History of Temporomandibular joint disease 11/30/2010 11/30/2010--patient was evaluated by ENT with complaints consistent with TMJ. Patient reports that her symptoms have resolved.   • History of Urinary tract infection 8/29/2017 09/26/2017--patient seen in follow-up and reports she feels better and she is back to her old self.  Results of the urine culture discussed.  It appears her urinary tract infection is cured.  09/01/2017--urine culture reveals less than 10,000 colony-forming units that is considered insignificant.  08/29/2017--patient seen in follow-up by Dr. Dietrich and she reports that she really does not feel well.  She has no dysuria but has urinary frequency.  Automated urinalysis is negative for leukocytes and also negative for blood and nitrites.  08/07/2017--patient presented to the urgent care with complaints of dysuria described as burning as well as urinary frequency and urgency.  Urinalysis revealed 3+ leukocytes and trace blood.  Nitrite negative.  She was discharged home on nitrofurantoin.  However, urine cultures returned greater than 100,000 colony-forming units of Proteus which was resistant to nitrofurantoin.  She was subsequently switched to Omnicef.    • Hyperlipidemia 2/26/2001 02/26/2001--patient was seen as a new patient. She was already on cholesterol medication at that time.   • Irritable bladder 8/29/2017 08/29/2017--patient seen in follow-up after recent urinary tract infection with complaints of nocturia ×4-5 per night as well as frequency and urgency.  Repeat urinalysis is negative.  Urine culture sent.  Given the fact the patient is had the urinary symptoms prior to the onset of the recent UTI, I will treat her empirically for irritable bladder.  Myrbetriq 25 mg per day.  May increase to 50 mg per day if necessary.  I will have her follow-up in about 3 weeks to reassess his situation.   • Irritable bowel syndrome 4/27/2016   • Macrocytosis 12/5/2017 12/05/2017--routine follow-up.  CBC is normal except MCV elevated at 99.5.  Homocystine, methylmalonic acid, serum B-12 and folic acid levels ordered.   • Menopausal state 5/17/2017   • Osteopenia, 05/30/2014--lumbar spine 0.6.  Right femoral neck -1.2.  Left femoral neck -1.7. 6/9/2006 05/30/2014--DEXA scan reveals average lumbar spine T score of 0.6. Right femoral neck T score -1.2. Left femoral neck T score -1.7. Osteopenia. Compared to the previous examination of 11/09/2010, there is a 1% decrease in the left hip bone mineral density, 2.1% decrease in the right hip bone density, and a 1.6% improvement in the lumbar spine bone density.   11/22/2010--treatment for osteopenia begun with Fosamax but patient discontinued it on her own.   11/09/2010--DEXA scan revealed average lumbar spine T score 0.5. Left femoral neck T score -1.4. Right femoral neck T score -1.1. Osteopenia.   06/09/2006--DEXA scan reveals lumbar spine T score 0.6. Left hip T score -0.8, left femoral neck T score -1.3.   • Primary osteoarthritis of left knee 3/23/2004    03/23/2004--patient was evaluated by the orthopedist for left knee pain. X-rays revealed osteoarthritis with some medial joint space narrowing, subchondral  sclerosis, and patellofemoral spurs. Cortisone injections were given.   • Vitamin D deficiency 2016         Past Surgical History:   Procedure Laterality Date   • CATARACT EXTRACTION Right 2014--cataract extirpation and intraocular lens implantation right eye.    • CATARACT EXTRACTION Left 2010--cataract extirpation and intraocular lens implantation in the left eye.   • COLONOSCOPY  2006--the entire examined colon was normal except for scattered diverticuli.    • COLONOSCOPY  10/16/2001    10/16/2001--colonoscopy revealed a few left-sided scattered diverticuli.         Allergies   Allergen Reactions   • Neomycin-Bacitracin Zn-Polymyx    • Sulfa Antibiotics            Current Outpatient Prescriptions:   •  amLODIPine-benazepril (LOTREL) 5-40 MG per capsule, Take 1 capsule by mouth Daily., Disp: 7 capsule, Rfl: 0  •  aspirin 81 MG tablet, Take  by mouth., Disp: , Rfl:   •  atorvastatin (LIPITOR) 80 MG tablet, Take 1 tablet by mouth Every Night., Disp: 90 tablet, Rfl: 3  •  Cholecalciferol (VITAMIN D3) 5000 UNITS capsule capsule, 1 by mouth daily as directed, Disp: 30 capsule, Rfl:   •  Garlic 1000 MG capsule, Take  by mouth., Disp: , Rfl:   •  hydrochlorothiazide (HYDRODIURIL) 25 MG tablet, TAKE 1 TABLET DAILY (NEED LABS AND FOLLOW UP AS SOON AS POSSIBLE), Disp: 90 tablet, Rfl: 0  •  metoprolol succinate XL (TOPROL-XL) 25 MG 24 hr tablet, Take  by mouth., Disp: , Rfl:   •  Multiple Vitamin (MULTI VITAMIN DAILY PO), Take  by mouth daily., Disp: , Rfl:   •  TRAVATAN Z 0.004 % solution ophthalmic solution, , Disp: , Rfl:       Family History   Problem Relation Age of Onset   • Cirrhosis Mother      Of Liver. Mother  at age 62 from alcoholic cirrhosis.   • Heart attack Father      Acute Myocardial Infarction.  Father  of a myocardial infarction at age 68.         Social History     Social History   • Marital status:      Spouse name: N/A   •  "Number of children: N/A   • Years of education: N/A     Occupational History   • Retired (2011) [employer]      Social History Main Topics   • Smoking status: Never Smoker   • Smokeless tobacco: Never Used   • Alcohol use No   • Drug use: No   • Sexual activity: Not Currently     Partners: Male     Other Topics Concern   • Not on file     Social History Narrative         Vitals:    12/05/17 1454   BP: 140/62   Pulse: 73   SpO2: 98%   Weight: 68.5 kg (151 lb)   Height: 157.5 cm (62.01\")          Physical Exam:     General: Alert and oriented x 3.  No acute distress.  Normal affect.  HEENT: Pupils equal, round, reactive to light; extraocular movements intact; sclerae nonicteric; pharynx, ear canals and TMs normal.  Neck: Without JVD, thyromegaly, bruit, or adenopathy.  Lungs: Clear to auscultation in all fields.  Heart: Regular rate and rhythm without murmur, rub, gallop, or click.  Abdomen: Soft, nontender, without hepatosplenomegaly or hernia.  Bowel sounds normal.  : Deferred.  Rectal: Deferred.  Extremities: Without clubbing, cyanosis, edema, or pulse deficit.  Neurologic: Intact without focal deficit.  Normal station and gait observed during ingress and egress from the examination room.  Skin: Without significant lesion.  Musculoskeletal: Unremarkable.      Lab/other results:    NMR is absolutely perfect.  CMP normal except blood sugar elevated at 103.  CBC essentially normal except MCV elevated at 99.5.  TSH is slightly elevated at 5.19.  Free T3 and free T4 normal.  Vitamin D normal.  CPK normal.    Assessment/Plan:     Diagnosis Plan   1. Hyperlipidemia, unspecified hyperlipidemia type     2. Benign essential hypertension     3. Carotid artery plaque, 05/30/2017--16-49% left.  Mild plaque right.  05/30/2014--mild bilateral carotid plaque.     4. Heart palpitations     5. Osteopenia of multiple sites     6. Vitamin D deficiency     7. Irritable bladder     8. Therapeutic drug monitoring "     9. Diverticulosis of colon     10. Macrocytosis     11. Hypothyroidism, unspecified type         Patient has hyperlipidemia which is under excellent control which is important given her carotid artery plaque.  She had a carotid Doppler study in May of this year which revealed 16-49% left and mild plaque on the right.  Her blood pressure slightly up today that is because she has not been taking the metoprolol because she's not having heart palpitations.  I recommended that she go ahead and reinitiate the metoprolol and she couldn't even take a half dose if necessary.  She has osteopenia of multiple sites and needs a repeat DEXA scan at some point in the near future.  Vitamin D is therapeutic.  Irritable bladder symptoms are tolerable even after discontinuation of the Myrbetriq.  She has complaints of intermittent mild left lower quadrant abdominal pain which is likely related to her diverticulosis.  Diet issues discussed.  Increased fiber intake is important.  Diagnosis of macrocytosis and possibly hypothyroidism need further evaluation.    Plan is as follows: Refill medications.  Restart metoprolol.  Check homocystine, methylmalonic acid, serum B-12 and folic acid levels.  Repeat thyroid function tests.  Thyroid antibodies.  If this confirms a diagnosis of hypothyroidism then we will proceed with thyroid ultrasound.  If that is the case I will have patient follow-up after the results of the ultrasound are known.  If not then we will have her follow-up after 05/17/2018 with lab prior and we will also do subsequent Medicare wellness visit.      Procedures

## 2024-11-18 DIAGNOSIS — E03.9 PRIMARY HYPOTHYROIDISM: Chronic | ICD-10-CM

## 2024-11-18 RX ORDER — LEVOTHYROXINE SODIUM 50 UG/1
TABLET ORAL
Qty: 90 TABLET | Refills: 3 | Status: SHIPPED | OUTPATIENT
Start: 2024-11-18

## 2025-02-17 DIAGNOSIS — R00.2 HEART PALPITATIONS: Chronic | ICD-10-CM

## 2025-02-17 DIAGNOSIS — I10 BENIGN ESSENTIAL HYPERTENSION: Chronic | ICD-10-CM

## 2025-02-17 RX ORDER — METOPROLOL SUCCINATE 25 MG/1
TABLET, EXTENDED RELEASE ORAL
Qty: 90 TABLET | Refills: 3 | Status: SHIPPED | OUTPATIENT
Start: 2025-02-17

## 2025-04-17 ENCOUNTER — TRANSCRIBE ORDERS (OUTPATIENT)
Dept: ADMINISTRATIVE | Facility: HOSPITAL | Age: OVER 89
End: 2025-04-17
Payer: MEDICARE

## 2025-04-17 ENCOUNTER — LAB (OUTPATIENT)
Dept: LAB | Facility: HOSPITAL | Age: OVER 89
End: 2025-04-17
Payer: MEDICARE

## 2025-04-17 DIAGNOSIS — I10 ESSENTIAL HYPERTENSION, BENIGN: ICD-10-CM

## 2025-04-17 DIAGNOSIS — M17.11 PRIMARY OSTEOARTHRITIS OF RIGHT KNEE: ICD-10-CM

## 2025-04-17 DIAGNOSIS — E03.9 HYPOTHYROIDISM, UNSPECIFIED TYPE: ICD-10-CM

## 2025-04-17 DIAGNOSIS — M25.561 CHRONIC PAIN OF RIGHT KNEE: ICD-10-CM

## 2025-04-17 DIAGNOSIS — E78.5 HYPERLIPIDEMIA, UNSPECIFIED HYPERLIPIDEMIA TYPE: ICD-10-CM

## 2025-04-17 DIAGNOSIS — Z51.81 ENCOUNTER FOR THERAPEUTIC DRUG MONITORING: ICD-10-CM

## 2025-04-17 DIAGNOSIS — E55.9 VITAMIN D DEFICIENCY DISEASE: ICD-10-CM

## 2025-04-17 DIAGNOSIS — Z51.81 THERAPEUTIC DRUG MONITORING: ICD-10-CM

## 2025-04-17 DIAGNOSIS — R73.01 IMPAIRED FASTING GLUCOSE: ICD-10-CM

## 2025-04-17 DIAGNOSIS — Z51.81 ENCOUNTER FOR THERAPEUTIC DRUG MONITORING: Primary | ICD-10-CM

## 2025-04-17 DIAGNOSIS — G89.29 CHRONIC PAIN OF RIGHT KNEE: ICD-10-CM

## 2025-04-17 LAB
BASOPHILS # BLD AUTO: 0.05 10*3/MM3 (ref 0–0.2)
BASOPHILS NFR BLD AUTO: 0.5 % (ref 0–1.5)
DEPRECATED RDW RBC AUTO: 44.4 FL (ref 37–54)
EOSINOPHIL # BLD AUTO: 0.02 10*3/MM3 (ref 0–0.4)
EOSINOPHIL NFR BLD AUTO: 0.2 % (ref 0.3–6.2)
ERYTHROCYTE [DISTWIDTH] IN BLOOD BY AUTOMATED COUNT: 12.8 % (ref 12.3–15.4)
HCT VFR BLD AUTO: 41.4 % (ref 34–46.6)
HGB BLD-MCNC: 13.8 G/DL (ref 12–15.9)
IMM GRANULOCYTES # BLD AUTO: 0.02 10*3/MM3 (ref 0–0.05)
IMM GRANULOCYTES NFR BLD AUTO: 0.2 % (ref 0–0.5)
LYMPHOCYTES # BLD AUTO: 0.8 10*3/MM3 (ref 0.7–3.1)
LYMPHOCYTES NFR BLD AUTO: 8.3 % (ref 19.6–45.3)
MCH RBC QN AUTO: 31.8 PG (ref 26.6–33)
MCHC RBC AUTO-ENTMCNC: 33.3 G/DL (ref 31.5–35.7)
MCV RBC AUTO: 95.4 FL (ref 79–97)
MONOCYTES # BLD AUTO: 0.83 10*3/MM3 (ref 0.1–0.9)
MONOCYTES NFR BLD AUTO: 8.6 % (ref 5–12)
NEUTROPHILS NFR BLD AUTO: 7.92 10*3/MM3 (ref 1.7–7)
NEUTROPHILS NFR BLD AUTO: 82.2 % (ref 42.7–76)
NRBC BLD AUTO-RTO: 0 /100 WBC (ref 0–0.2)
PLATELET # BLD AUTO: 296 10*3/MM3 (ref 140–450)
PMV BLD AUTO: 11.5 FL (ref 6–12)
RBC # BLD AUTO: 4.34 10*6/MM3 (ref 3.77–5.28)
URATE SERPL-MCNC: 6.3 MG/DL (ref 2.4–5.7)
WBC NRBC COR # BLD AUTO: 9.64 10*3/MM3 (ref 3.4–10.8)

## 2025-04-17 PROCEDURE — 80061 LIPID PANEL: CPT | Performed by: INTERNAL MEDICINE

## 2025-04-17 PROCEDURE — 84439 ASSAY OF FREE THYROXINE: CPT | Performed by: INTERNAL MEDICINE

## 2025-04-17 PROCEDURE — 82550 ASSAY OF CK (CPK): CPT | Performed by: INTERNAL MEDICINE

## 2025-04-17 PROCEDURE — 83036 HEMOGLOBIN GLYCOSYLATED A1C: CPT | Performed by: INTERNAL MEDICINE

## 2025-04-17 PROCEDURE — 83704 LIPOPROTEIN BLD QUAN PART: CPT | Performed by: INTERNAL MEDICINE

## 2025-04-17 PROCEDURE — 84481 FREE ASSAY (FT-3): CPT | Performed by: INTERNAL MEDICINE

## 2025-04-17 PROCEDURE — 80053 COMPREHEN METABOLIC PANEL: CPT | Performed by: INTERNAL MEDICINE

## 2025-04-17 PROCEDURE — 85025 COMPLETE CBC W/AUTO DIFF WBC: CPT

## 2025-04-17 PROCEDURE — 84443 ASSAY THYROID STIM HORMONE: CPT | Performed by: INTERNAL MEDICINE

## 2025-04-17 PROCEDURE — 82306 VITAMIN D 25 HYDROXY: CPT | Performed by: INTERNAL MEDICINE

## 2025-04-17 PROCEDURE — 84550 ASSAY OF BLOOD/URIC ACID: CPT

## 2025-04-22 ENCOUNTER — OFFICE VISIT (OUTPATIENT)
Age: OVER 89
End: 2025-04-22
Payer: MEDICARE

## 2025-04-22 VITALS — BODY MASS INDEX: 26.22 KG/M2 | RESPIRATION RATE: 20 BRPM | HEIGHT: 63 IN | WEIGHT: 148 LBS

## 2025-04-22 DIAGNOSIS — M25.662 DECREASED RANGE OF MOTION (ROM) OF BOTH KNEES: ICD-10-CM

## 2025-04-22 DIAGNOSIS — Z78.9 IMPAIRED MOBILITY AND ACTIVITIES OF DAILY LIVING: ICD-10-CM

## 2025-04-22 DIAGNOSIS — M25.661 DECREASED RANGE OF MOTION (ROM) OF BOTH KNEES: ICD-10-CM

## 2025-04-22 DIAGNOSIS — Z74.09 IMPAIRED MOBILITY AND ACTIVITIES OF DAILY LIVING: ICD-10-CM

## 2025-04-22 DIAGNOSIS — Z91.81 AT RISK FOR FALLS: ICD-10-CM

## 2025-04-22 DIAGNOSIS — M17.0 PRIMARY OSTEOARTHRITIS OF BOTH KNEES: Primary | ICD-10-CM

## 2025-04-22 DIAGNOSIS — R29.898 WEAKNESS OF BOTH LOWER EXTREMITIES: ICD-10-CM

## 2025-04-22 RX ADMIN — METHYLPREDNISOLONE ACETATE 80 MG: 80 INJECTION, SUSPENSION INTRA-ARTICULAR; INTRALESIONAL; INTRAMUSCULAR; SOFT TISSUE at 15:31

## 2025-04-22 RX ADMIN — LIDOCAINE HYDROCHLORIDE 2 ML: 10 INJECTION, SOLUTION EPIDURAL; INFILTRATION; INTRACAUDAL; PERINEURAL at 15:31

## 2025-04-29 ENCOUNTER — TELEPHONE (OUTPATIENT)
Dept: SPORTS MEDICINE | Facility: CLINIC | Age: OVER 89
End: 2025-04-29
Payer: MEDICARE

## 2025-04-29 NOTE — TELEPHONE ENCOUNTER
Caller: YOJANA AMARAL    Relationship to patient: SON     Best call back number:     Patient is needing: MR AMARAL STATED THEY HAD NOT HEARD ANYTHING FROM [facility] AFTER THE PATIENT'S APPT ON 4/22/25 SO HE CALLED AND THEY TOLD HIM THEY DO NOT HAVE A REFERRAL FOR THE PATIENT. MR AMARAL WOULD LIKE THE REFERRAL TO BE SENT TO [facility].

## 2025-04-29 NOTE — TELEPHONE ENCOUNTER
Called and spoke with Katie at [facility] and they are awaiting acceptance from there team to see if she qualifies. Called and advised Ignacio of the message above. I informed him we will be in touch and that if she does not get accepted I will speak with Dr Vallse about next steps. He stated understanding.

## 2025-04-30 NOTE — TELEPHONE ENCOUNTER
Home health called the office yesterday 4.29.25 they stated that the patient has been accepted into there program. Approval was faxed to our office, it has been scanned into the chart. I called and gave Ignacio the number [phone] to schedule Mrs Hawkins. He stated understanding and asked how many sessions she would receive, I advised him that Home Health will be the ones to determine how long she needs to be in there program

## 2025-05-02 ENCOUNTER — TELEPHONE (OUTPATIENT)
Dept: SPORTS MEDICINE | Facility: CLINIC | Age: OVER 89
End: 2025-05-02
Payer: MEDICARE

## 2025-05-02 NOTE — TELEPHONE ENCOUNTER
My previous employer forwarded me a voicemail from Annie, from [facility] [facility]. She was requesting verbal orders for OT. I called the provided number back with no answer. Will try again later.

## 2025-05-05 DIAGNOSIS — I10 BENIGN ESSENTIAL HYPERTENSION: Chronic | ICD-10-CM

## 2025-05-05 RX ORDER — AMLODIPINE AND BENAZEPRIL HYDROCHLORIDE 5; 40 MG/1; MG/1
1 CAPSULE ORAL DAILY
Qty: 90 CAPSULE | Refills: 3 | Status: SHIPPED | OUTPATIENT
Start: 2025-05-05

## 2025-05-05 NOTE — TELEPHONE ENCOUNTER
Rx Refill Note  Requested Prescriptions     Pending Prescriptions Disp Refills    amLODIPine-benazepril (LOTREL) 5-40 MG per capsule [Pharmacy Med Name: AMLODIPINE/BENAZEPRIL CAPS 5/40MG] 90 capsule 3     Sig: TAKE 1 CAPSULE DAILY      Last office visit with prescribing clinician: 10/3/2024   Last telemedicine visit with prescribing clinician: Visit date not found   Next office visit with prescribing clinician: 5/9/2025       Carolyn Bruno  05/05/25, 11:05 EDT

## 2025-05-08 NOTE — TELEPHONE ENCOUNTER
Called and spoke with Annie to place OT orders.     I was informed that the number she called (for my previous employer in [place]) was found on their EMR system. Will work with manager to update to the correct phone number.

## 2025-05-09 ENCOUNTER — OFFICE VISIT (OUTPATIENT)
Dept: INTERNAL MEDICINE | Facility: CLINIC | Age: OVER 89
End: 2025-05-09
Payer: MEDICARE

## 2025-05-09 VITALS
RESPIRATION RATE: 16 BRPM | OXYGEN SATURATION: 96 % | BODY MASS INDEX: 26.22 KG/M2 | WEIGHT: 148 LBS | TEMPERATURE: 98 F | HEART RATE: 62 BPM | SYSTOLIC BLOOD PRESSURE: 126 MMHG | DIASTOLIC BLOOD PRESSURE: 72 MMHG | HEIGHT: 63 IN

## 2025-05-09 DIAGNOSIS — I10 BENIGN ESSENTIAL HYPERTENSION: Chronic | ICD-10-CM

## 2025-05-09 DIAGNOSIS — M85.89 OSTEOPENIA OF MULTIPLE SITES: Chronic | ICD-10-CM

## 2025-05-09 DIAGNOSIS — F41.8 SITUATIONAL ANXIETY: Chronic | ICD-10-CM

## 2025-05-09 DIAGNOSIS — Z23 NEED FOR PNEUMOCOCCAL 20-VALENT CONJUGATE VACCINATION: ICD-10-CM

## 2025-05-09 DIAGNOSIS — Z78.0 POSTMENOPAUSAL STATE: Chronic | ICD-10-CM

## 2025-05-09 DIAGNOSIS — R00.2 HEART PALPITATIONS: Chronic | ICD-10-CM

## 2025-05-09 DIAGNOSIS — H40.10X0 OPEN-ANGLE GLAUCOMA OF BOTH EYES, UNSPECIFIED GLAUCOMA STAGE, UNSPECIFIED OPEN-ANGLE GLAUCOMA TYPE: Chronic | ICD-10-CM

## 2025-05-09 DIAGNOSIS — E03.9 PRIMARY HYPOTHYROIDISM: Chronic | ICD-10-CM

## 2025-05-09 DIAGNOSIS — E79.0 HYPERURICEMIA: ICD-10-CM

## 2025-05-09 DIAGNOSIS — I65.23 ATHEROSCLEROSIS OF BOTH CAROTID ARTERIES: Chronic | ICD-10-CM

## 2025-05-09 DIAGNOSIS — R73.01 IMPAIRED FASTING GLUCOSE: Primary | Chronic | ICD-10-CM

## 2025-05-09 DIAGNOSIS — E78.2 MIXED HYPERLIPIDEMIA: Chronic | ICD-10-CM

## 2025-05-09 DIAGNOSIS — N32.89 IRRITABLE BLADDER: Chronic | ICD-10-CM

## 2025-05-09 DIAGNOSIS — M17.12 PRIMARY OSTEOARTHRITIS OF LEFT KNEE: Chronic | ICD-10-CM

## 2025-05-09 DIAGNOSIS — K58.0 IRRITABLE BOWEL SYNDROME WITH DIARRHEA: Chronic | ICD-10-CM

## 2025-05-09 DIAGNOSIS — E55.9 VITAMIN D DEFICIENCY: Chronic | ICD-10-CM

## 2025-05-09 DIAGNOSIS — K86.9 PANCREATIC LESION: Chronic | ICD-10-CM

## 2025-05-09 DIAGNOSIS — M16.12 PRIMARY OSTEOARTHRITIS OF LEFT HIP: Chronic | ICD-10-CM

## 2025-05-09 DIAGNOSIS — Z51.81 THERAPEUTIC DRUG MONITORING: ICD-10-CM

## 2025-05-09 DIAGNOSIS — Z85.828 HISTORY OF SQUAMOUS CELL CARCINOMA OF SKIN: Chronic | ICD-10-CM

## 2025-05-09 DIAGNOSIS — M17.11 PRIMARY OSTEOARTHRITIS OF RIGHT KNEE: ICD-10-CM

## 2025-05-09 PROBLEM — G89.29 CHRONIC PAIN OF RIGHT KNEE: Status: RESOLVED | Noted: 2024-06-07 | Resolved: 2025-05-09

## 2025-05-09 PROBLEM — M25.561 CHRONIC PAIN OF RIGHT KNEE: Status: RESOLVED | Noted: 2024-06-07 | Resolved: 2025-05-09

## 2025-05-09 RX ORDER — MELOXICAM 7.5 MG/1
TABLET ORAL
Qty: 90 TABLET | Refills: 3 | Status: SHIPPED | OUTPATIENT
Start: 2025-05-09

## 2025-05-09 NOTE — PROGRESS NOTES
05/09/2025    Patient Information  Stephanie Hawkins                                                                                          [id number]  [facility] [address]      12/16/1932  [unfilled]  There is no work phone number on file.    Chief Complaint:     Follow-up of chronic medical problems and recent lab work.  No new acute complaints.    History of Present Illness:    Patient with multiple chronic medical problems as noted below in assessment and plan presents today for follow-up with lab prior in order to monitor chronic medical issues.  Her past medical history reviewed and updated were necessary including health maintenance parameters.  This reveals she needs a DEXA scan which I have ordered.  She also needs RSV vaccine and I have encouraged her to get this at the local drugstore.    Review of Systems   Constitutional: Negative.   HENT: Negative.     Eyes: Negative.    Cardiovascular: Negative.    Respiratory: Negative.     Endocrine: Negative.    Hematologic/Lymphatic: Negative.    Skin: Negative.    Musculoskeletal: Negative.  Positive for arthritis and joint pain.   Gastrointestinal: Negative.    Genitourinary: Negative.    Neurological: Negative.    Psychiatric/Behavioral: Negative.     Allergic/Immunologic: Negative.        Active Problems:    Patient Active Problem List   Diagnosis   • Benign essential hypertension   • Carotid artery plaque, 6/13/2019--mild bilateral.  05/30/2017--16-49% left.  Mild plaque right.  05/30/2014--mild bilateral carotid plaque.   • Diverticulosis of colon   • Heart palpitations   • Hyperlipidemia   • Irritable bowel syndrome   • Primary osteoarthritis of left knee   • Osteopenia of multiple sites   • Vitamin D deficiency   • Therapeutic drug monitoring   • Glaucoma, bilateral   • Irritable bladder   • Primary hypothyroidism   • Impaired fasting glucose   • Pancreatic lesion, 6/13/2019--dilated pancreatic side duct.  No mass.  No further follow-up.   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 She was ordered being treated for hypertension at that time.   • Carotid artery plaque, 6/13/2019--mild bilateral.  05/30/2017--16-49% left.  Mild plaque right.  05/30/2014--mild bilateral carotid plaque. 01/08/2002 June 13, 2019--carotid Doppler study reveals mild bilateral carotid plaque.  No percentage numbers given.  05/30/2017--carotid Doppler study reveals right plaque without stenosis.  There is 16-49% stenosis of the left ICA.  05/30/2014--vascular screen reveals mild bilateral carotid plaque, negative for AAA, negative for PAD.   06/19/2012--vascular screen revealed bilateral, less than 50% internal    • Chronic left hip pain 06/25/2020 June 25, 2020--patient reports that she has had intermittent left hip pain for several years now.  It may or may not be getting worse and we discussed orthopedic referral which we will hold off at the present time.  Instead we will obtain an x-ray and depending upon the result and her clinical course will determine orthopedic referral.   • Chronic left hip pain 06/25/2020 June 25, 2020--patient reports that she has had intermittent left hip pain for several years now.  It may or may not be getting worse and we discussed orthopedic referral which we will hold off at the present time.  Instead we will obtain an x-ray and depending upon the result and her clinical course will determine orthopedic referral.     • Diverticulosis of colon 10/16/2001    11/03/2006--the entire examined colon was normal except for scattered diverticuli.   10/16/2001--colonoscopy revealed a few  left-sided scattered diverticuli.   • Glaucoma, bilateral 06/01/2016 06/01/2016--routine follow-up.  She reports she was diagnosed with glaucoma about a month or so ago treated with Travatan eyedrops.   • Heart palpitations 10/13/2008    Patient has at a long history of heart palpitations.   10/13/2008--24-hour Holter monitor reveals an average heart rate of 69, a minimum heart rate was 55. Maximum heart rate was 98. There were no pauses. Ventricular ectopy was zero. Supraventricular ectopy was 5585, with 15 supraventricular runs. Supraventricular bigeminy events were 6 and supraventricular trigeminy events were one. There was no atrial fibrillation/flutter. No ST segment changes. Patient's heart palpitations related to frequent PACs which have been controlled with atenolol.   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     • Hyperlipidemia 02/26/2001 02/26/2001--patient was seen as a new patient. She was already on cholesterol medication at that time.   • Hyperuricemia 05/09/2025   • Impaired fasting glucose 06/14/2018 06/14/2018--routine follow-up.  Fasting serum glucose mildly elevated at 102.  Recommend low carbohydrate diet and weight loss.  Exercise.   • Irritable bladder 08/29/2017 08/29/2017--patient seen in follow-up after recent urinary tract infection with complaints of nocturia ×4-5 per night as well as frequency and urgency.  Repeat urinalysis is negative.  Urine culture sent.  Given the fact the patient is had the urinary symptoms prior to the onset of the recent UTI, I will treat her empirically for irritable bladder.  Myrbetriq 25 mg per day.  May increase to 50 mg per day if necessary.  I will have her follow-up in about 3 weeks to reassess his situation.   • Irritable bowel syndrome 04/27/2016   • Menopausal state  "05/17/2017   • Osteopenia, 06/20/2018--lumbar spine 0.4.  Right femoral neck -1.1.  Left femoral neck -0.8.  05/30/2014--lumbar spine 0.6.  Right femoral neck -1.2.  Left femoral neck -1.7. 06/09/2006 06/20/2018--DEXA scan reveals average lumbar T score 0.4.  Right femoral neck T score -1.1.  Left femoral neck T score -0.8.  Assessment is osteopenia and compared to previous study in 2016 there is been no change in the right femoral neck and 17.2% improvement in the left femoral neck and a 2.1% decrease in the lumbar spine.  05/30/2014--DEXA scan reveals average lumbar spine T score of 0.6. Right femoral neck T score -1.2. Left femoral neck T score -1.7. Osteopenia. Compared to the previous examination of 11/09/2010, there is a 1% decrease in the left hip bone mineral density, 2.1% decrease in the right hip bone density, and a 1.6% improvement in the lumbar spine bone density.   11/22/2010--treatment for osteopenia begun with Fosamax but patient discontinued it on her own.   11/09/2010--DEXA scan revealed average lumbar spine T score 0.5. Left femoral neck T score -1.4. Right femoral neck T score -1.1. Osteopenia.   06/09/2006--DEXA scan reveals lumbar spine T score 0.6. Left hip T score -0.8, left    • Pancreatic lesion, 4 mm, repeat study due June 2019 06/27/2018 11/27/2018--patient seen in follow-up and reports she is having urinary frequency without dysuria, but no incontinence and occasionally has some urgency.  She has no pain at the present time.  Apparently she saw a gynecologist and he did not examine her because \"we do not do Pap smears on a woman year age\".  06/27/2018--patient seen in follow-up and the results of the CT scan discussed.  She curre   • Possible Pelvic congestion syndrome 06/27/2018 11/27/2018--patient seen in follow-up and reports she is having urinary frequency without dysuria, but no incontinence and occasionally has some urgency.  She has no pain at the present time.  Apparently " "she saw a gynecologist and he did not examine her because \"we do not do Pap smears on a woman year age\".  06/27/2018--patient seen in follow-up and the results of the CT scan discussed.  She curre   • Primary hypothyroidism 12/05/2017    May 28, 2019--routine follow-up.  TSH slightly elevated at 4.56.  Free T3 and free T4 are normal.  Continued monitoring.  12/05/2017--routine follow-up.  TSH elevated at 5.19.  Free T3 and free T4 are normal.  Repeat thyroid function tests ordered.  Thyroid antibodies ordered.  If these confirm diagnosis of hypothyroidism and we will proceed with thyroid ultrasound.   • Primary osteoarthritis of left hip 02/21/2024 June 25, 2020--patient reports that she has had intermittent left hip pain for several years now.  It may or may not be getting worse and we discussed orthopedic referral which we will hold off at the present time.  Instead we will obtain an x-ray and depending upon the result and her clinical course will determine orthopedic referral.     • Primary osteoarthritis of left knee 03/23/2004 03/23/2004--patient was evaluated by the orthopedist for left knee pain. X-rays revealed osteoarthritis with some medial joint space narrowing, subchondral sclerosis, and patellofemoral spurs. Cortisone injections were given.   • Primary osteoarthritis of right knee 06/07/2024   • Situational anxiety 11/08/2023   • Subclinical hypothyroidism 12/05/2017    May 28, 2019--routine follow-up.  TSH slightly elevated at 4.56.  Free T3 and free T4 are normal.  Continued monitoring.  12/05/2017--routine follow-up.  TSH elevated at 5.19.  Free T3 and free T4 are normal.  Repeat thyroid function tests ordered.  Thyroid antibodies ordered.  If these confirm diagnosis of hypothyroidism and we will proceed with thyroid ultrasound.   • Vitamin D deficiency 04/27/2016         Past Surgical History:   Procedure Laterality Date   • CATARACT EXTRACTION Right 01/2014 January 2014--cataract " extirpation and intraocular lens implantation right eye.    • CATARACT EXTRACTION Left 09/2010 September 2010--cataract extirpation and intraocular lens implantation in the left eye.   • COLONOSCOPY  11/03/2006 11/03/2006--the entire examined colon was normal except for scattered diverticuli.    • COLONOSCOPY  10/16/2001    10/16/2001--colonoscopy revealed a few left-sided scattered diverticuli.   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         Allergies   Allergen Reactions   • Neomycin-Bacitracin Zn-Polymyx Swelling     Neosporin   • Sulfa Antibiotics Hives   • Penicillins Unknown - Low Severity           Current Outpatient Medications:   •  amLODIPine-benazepril (LOTREL) 5-40 MG per capsule, TAKE 1 CAPSULE DAILY, Disp: 90 capsule, Rfl: 3  •  Aspirin 81 MG capsule, Take 81 mg by mouth Daily., Disp: , Rfl:   •  atorvastatin (LIPITOR) 40 MG tablet, TAKE 1 TABLET DAILY FOR HIGH CHOLESTEROL, Disp: 90 tablet, Rfl: 3  •  GARLIC PO, Take  by mouth., Disp: , Rfl:   •  hydroCHLOROthiazide 25 MG tablet, TAKE 1 TABLET DAILY FOR HIGH BLOOD PRESSURE, Disp: 90 tablet, Rfl: 3  •  levothyroxine (SYNTHROID, LEVOTHROID) 50 MCG tablet, TAKE 1 TABLET EVERY MORNING FOR LOW THYROID, Disp: 90 tablet, Rfl: 3  •  meloxicam (Mobic) 7.5 MG tablet, Take 1 p.o. daily with food for arthritis, Disp: 90 tablet, Rfl: 3  •  metoprolol succinate XL (TOPROL-XL) 25 MG 24 hr tablet, TAKE 1 TABLET EVERY MORNING FOR HIGH BLOOD PRESSURE, HEART AND PALPITATIONS, Disp: 90 tablet, Rfl: 3  •  Multiple Vitamins-Minerals (PRESERVISION AREDS PO), Take  by mouth Every Night., Disp: , Rfl:   •  multivitamin (MULTI VITAMIN DAILY PO), Take 1 p.o. daily, Disp: , Rfl:   •  timolol (TIMOPTIC) 0.5 % ophthalmic solution, Apply as directed to the affected eye for glaucoma, Disp:  , Rfl:   •  vitamin D3 125 MCG (5000 UT) capsule  "capsule, 1 by mouth daily as directed, Disp: , Rfl:       Family History   Problem Relation Age of Onset   • Cirrhosis Mother         Of Liver. Mother  at age 62 from alcoholic cirrhosis.   • Heart attack Father         Acute Myocardial Infarction.  Father  of a myocardial infarction at age 68.         Social History     Socioeconomic History   • Marital status:      Spouse name: Az   • Number of children: 3   • Highest education level: Bachelor's degree (e.g., BA, AB, BS)   Tobacco Use   • Smoking status: Never   • Smokeless tobacco: Never   Vaping Use   • Vaping status: Never Used   Substance and Sexual Activity   • Alcohol use: No   • Drug use: No   • Sexual activity: Not Currently     Partners: Male         Vitals:    25 1200   BP: 126/72   Pulse: 62   Resp: 16   Temp: 98 °F (36.7 °C)   TempSrc: Oral   SpO2: 96%   Weight: 67.1 kg (148 lb)   Height: 160 cm (62.99\")        Body mass index is 26.22 kg/m².      Physical Exam:    General: Alert and oriented x 3.  No acute distress.  Slightly overweight.  Normal affect.  HEENT: Pupils equal, round, reactive to light; extraocular movements intact; sclerae nonicteric; pharynx, ear canals and TMs normal.  Neck: Without JVD, thyromegaly, bruit, or adenopathy.  Lungs: Clear to auscultation in all fields.  Heart: Regular rate and rhythm without murmur, rub, gallop, or click.  Abdomen: Soft, nontender, without hepatosplenomegaly or hernia.  Bowel sounds normal.  : Deferred.  Rectal: Deferred.  Extremities: Without clubbing, cyanosis, edema, or pulse deficit.  Neurologic: Intact without focal deficit.  Patient is nonambulatory and in a wheelchair today..  Skin: Without significant lesion.  Musculoskeletal: Unremarkable.    Lab/other results:    CK normal.  CMP reveals a glucose of 120, BUN 28, calcium mildly elevated 9.8.  Hemoglobin A1c 6.4.  Total cholesterol 172, triglycerides 86, LDL particle #1041, HDL particle #37.7.  Thyroid function tests " are normal.  Vitamin D normal at 51.8.  CBC normal except neutrophils mildly elevated 82.2.  Uric acid elevated 6.3.    Assessment/Plan:     Diagnosis Plan   1. Impaired fasting glucose  Comprehensive Metabolic Panel    Hemoglobin A1c    Urinalysis With Microscopic If Indicated (No Culture) - Urine, Clean Catch      2. Hyperlipidemia  CK    Comprehensive Metabolic Panel    NMR LipoProfile      3. Primary hypothyroidism  TSH    T4, Free    T3, Free      4. Benign essential hypertension  Comprehensive Metabolic Panel      5. Hyperuricemia  Uric Acid      6. Vitamin D deficiency  Vitamin D,25-Hydroxy      7. Osteopenia of multiple sites  DEXA Bone Density Axial      8. Postmenopausal state  DEXA Bone Density Axial      9. Pancreatic lesion, 6/13/2019--dilated pancreatic side duct.  No mass.  No further follow-up.  4 mm, repeat study due June 2019        10. Irritable bladder        11. Open-angle glaucoma of both eyes, unspecified glaucoma stage, unspecified open-angle glaucoma type        12. Irritable bowel syndrome with diarrhea        13. Heart palpitations        14. Carotid artery plaque, 6/13/2019--mild bilateral.  05/30/2017--16-49% left.  Mild plaque right.  05/30/2014--mild bilateral carotid plaque.        15. Situational anxiety        16. Primary osteoarthritis of left hip  meloxicam (Mobic) 7.5 MG tablet      17. Primary osteoarthritis of right knee        18. History of squamous cell carcinoma of skin of the face        19. Therapeutic drug monitoring  CBC (No Diff)      20. Need for pneumococcal 20-valent conjugate vaccination  Pneumococcal Conjugate Vaccine 20-Valent (PCV20)      21. Primary osteoarthritis of left knee  meloxicam (Mobic) 7.5 MG tablet        Patient has impaired fasting glucose and is at risk for development of overt diabetes.  I strongly recommend low carbohydrate diet and attainment of ideal body weight.  Hyperlipidemia is under good control.  Blood pressure has been under good  control.  Patient has mild hyperuricemia and also has arthralgia of multiple joints and suspected degenerative arthritis.  See below for further considerations.  She has osteopenia and needs a DEXA scan.  Her vitamin D is in normal range with supplementation.  The pancreatic lesion is a dilated pancreatic side duct and no mass and this has been stable.  Irritable bladder symptoms are reasonably controlled.  She has glaucoma and is followed by the eye doctor.  IBS symptoms controlled.  Heart palpitations currently not an issue.  Patient has carotid artery plaque which is mild and she had a scan in 2022.  Will hold off for now for further scans given her age and overall situation.  She has a history of skin cancer and is followed by the dermatologist.    Plan is as follows: Low carbohydrate diet and attainment of ideal body weight although patient is just a little overweight.  I am not going to severely restrict her given her age and the overall situation.  She has hyperuricemia which is mild and I decided not to add another medication at this time.  DEXA scan ordered.  Encouraged patient to make sure she follows up with her eye doctor.  Will hold off on any carotid Doppler studies given her fact that her carotid disease is very mild.  Also encouraged her to follow-up with the dermatologist.  I will have her follow-up after October 3, 2025 for subsequent Medicare wellness visit.  Also recommend she stop by the drugstore and inquire about RSV vaccine.  Prevnar 20 given.        Procedures

## 2025-05-12 ENCOUNTER — TELEPHONE (OUTPATIENT)
Dept: SPORTS MEDICINE | Facility: CLINIC | Age: OVER 89
End: 2025-05-12
Payer: MEDICARE

## 2025-05-12 RX ORDER — METHYLPREDNISOLONE ACETATE 80 MG/ML
80 INJECTION, SUSPENSION INTRA-ARTICULAR; INTRALESIONAL; INTRAMUSCULAR; SOFT TISSUE
Status: COMPLETED | OUTPATIENT
Start: 2025-04-22 | End: 2025-04-22

## 2025-05-12 RX ORDER — LIDOCAINE HYDROCHLORIDE 10 MG/ML
2 INJECTION, SOLUTION EPIDURAL; INFILTRATION; INTRACAUDAL; PERINEURAL
Status: COMPLETED | OUTPATIENT
Start: 2025-04-22 | End: 2025-04-22

## 2025-05-12 NOTE — TELEPHONE ENCOUNTER
Cristian lemons/[facility] called to get verbal order for additional HH visits (1xweek for 2 weeks) advised Ok per Dr Valles.

## 2025-05-13 ENCOUNTER — TELEPHONE (OUTPATIENT)
Dept: SPORTS MEDICINE | Facility: CLINIC | Age: OVER 89
End: 2025-05-13
Payer: MEDICARE

## 2025-05-19 ENCOUNTER — TELEPHONE (OUTPATIENT)
Dept: INTERNAL MEDICINE | Facility: CLINIC | Age: OVER 89
End: 2025-05-19

## 2025-05-19 NOTE — TELEPHONE ENCOUNTER
Caller: Stephanie Hawkins    Relationship: Self    Best call back number: [phone] (Home)     Requested Prescriptions:   A SMALL PILL FOR ARTHRITIS        Pharmacy where request should be sent:  [facility] [address] [place] [address] [address] [facility] [phone] [facility] [phone] [facility]     Last office visit with prescribing clinician: 5/9/2025   Last telemedicine visit with prescribing clinician: Visit date not found   Next office visit with prescribing clinician: 10/8/2025     Additional details provided by patient: PLEASE CONTACT PATIENT TO ADVISE.      Does the patient have less than a 3 day supply:  [] Yes  [] No    Would you like a call back once the refill request has been completed: [] Yes [] No    If the office needs to give you a call back, can they leave a voicemail: [] Yes [] No    Leslee Anderson Rep   05/19/25 12:26 EDT         THANKS

## 2025-07-01 ENCOUNTER — HOSPITAL ENCOUNTER (OUTPATIENT)
Dept: BONE DENSITY | Facility: HOSPITAL | Age: OVER 89
Discharge: HOME | End: 2025-07-01
Admitting: INTERNAL MEDICINE
Payer: MEDICARE

## 2025-07-01 PROCEDURE — 77080 DXA BONE DENSITY AXIAL: CPT

## 2025-07-23 ENCOUNTER — OFFICE VISIT (OUTPATIENT)
Age: OVER 89
End: 2025-07-23
Payer: MEDICARE

## 2025-07-23 VITALS — BODY MASS INDEX: 26.58 KG/M2 | RESPIRATION RATE: 18 BRPM | WEIGHT: 150 LBS | TEMPERATURE: 98.6 F | HEIGHT: 63 IN

## 2025-07-23 DIAGNOSIS — Z78.9 IMPAIRED MOBILITY AND ACTIVITIES OF DAILY LIVING: ICD-10-CM

## 2025-07-23 DIAGNOSIS — Z74.09 IMPAIRED MOBILITY AND ACTIVITIES OF DAILY LIVING: ICD-10-CM

## 2025-07-23 DIAGNOSIS — M25.661 DECREASED RANGE OF MOTION (ROM) OF BOTH KNEES: ICD-10-CM

## 2025-07-23 DIAGNOSIS — Z91.81 AT RISK FOR FALLS: ICD-10-CM

## 2025-07-23 DIAGNOSIS — M25.662 DECREASED RANGE OF MOTION (ROM) OF BOTH KNEES: ICD-10-CM

## 2025-07-23 DIAGNOSIS — M17.0 BILATERAL PRIMARY OSTEOARTHRITIS OF KNEE: Primary | ICD-10-CM

## 2025-07-23 DIAGNOSIS — R29.898 WEAKNESS OF BOTH LOWER EXTREMITIES: ICD-10-CM

## 2025-07-23 RX ADMIN — LIDOCAINE HYDROCHLORIDE 2 ML: 10 INJECTION, SOLUTION EPIDURAL; INFILTRATION; INTRACAUDAL; PERINEURAL at 10:40

## 2025-07-23 RX ADMIN — METHYLPREDNISOLONE ACETATE 80 MG: 80 INJECTION, SUSPENSION INTRA-ARTICULAR; INTRALESIONAL; INTRAMUSCULAR; SOFT TISSUE at 10:40

## 2025-08-07 RX ORDER — LIDOCAINE HYDROCHLORIDE 10 MG/ML
2 INJECTION, SOLUTION EPIDURAL; INFILTRATION; INTRACAUDAL; PERINEURAL
Status: COMPLETED | OUTPATIENT
Start: 2025-07-23 | End: 2025-07-23

## 2025-08-07 RX ORDER — METHYLPREDNISOLONE ACETATE 80 MG/ML
80 INJECTION, SUSPENSION INTRA-ARTICULAR; INTRALESIONAL; INTRAMUSCULAR; SOFT TISSUE
Status: COMPLETED | OUTPATIENT
Start: 2025-07-23 | End: 2025-07-23

## 2025-08-11 ENCOUNTER — TELEPHONE (OUTPATIENT)
Dept: ORTHOPEDIC SURGERY | Facility: CLINIC | Age: OVER 89
End: 2025-08-11
Payer: MEDICARE

## 2025-08-12 DIAGNOSIS — M17.0 PRIMARY OSTEOARTHRITIS OF BOTH KNEES: Primary | ICD-10-CM
